# Patient Record
Sex: MALE | Race: WHITE | NOT HISPANIC OR LATINO
[De-identification: names, ages, dates, MRNs, and addresses within clinical notes are randomized per-mention and may not be internally consistent; named-entity substitution may affect disease eponyms.]

---

## 2017-03-30 ENCOUNTER — APPOINTMENT (OUTPATIENT)
Dept: PULMONOLOGY | Facility: CLINIC | Age: 82
End: 2017-03-30

## 2017-03-30 VITALS
SYSTOLIC BLOOD PRESSURE: 130 MMHG | HEART RATE: 70 BPM | TEMPERATURE: 97.3 F | WEIGHT: 175 LBS | DIASTOLIC BLOOD PRESSURE: 90 MMHG | HEIGHT: 72 IN | OXYGEN SATURATION: 96 % | BODY MASS INDEX: 23.7 KG/M2

## 2017-03-30 RX ORDER — RIVAROXABAN 2.5 MG/1
TABLET, FILM COATED ORAL
Refills: 0 | Status: ACTIVE | COMMUNITY

## 2017-03-30 RX ORDER — METHYLPREDNISOLONE 4 MG/1
4 TABLET ORAL
Qty: 1 | Refills: 5 | Status: ACTIVE | COMMUNITY
Start: 2017-03-30 | End: 1900-01-01

## 2017-07-22 ENCOUNTER — EMERGENCY (EMERGENCY)
Facility: HOSPITAL | Age: 82
LOS: 1 days | Discharge: HOME | End: 2017-07-22
Attending: EMERGENCY MEDICINE | Admitting: EMERGENCY MEDICINE
Payer: MEDICARE

## 2017-07-22 VITALS
OXYGEN SATURATION: 97 % | SYSTOLIC BLOOD PRESSURE: 142 MMHG | HEART RATE: 73 BPM | WEIGHT: 175.05 LBS | DIASTOLIC BLOOD PRESSURE: 92 MMHG | RESPIRATION RATE: 17 BRPM | HEIGHT: 72 IN | TEMPERATURE: 98 F
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
## 2017-07-22 DIAGNOSIS — M25.571 PAIN IN RIGHT ANKLE AND JOINTS OF RIGHT FOOT: ICD-10-CM

## 2017-07-22 DIAGNOSIS — Z98.89 OTHER SPECIFIED POSTPROCEDURAL STATES: Chronic | ICD-10-CM
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
## 2017-07-22 DIAGNOSIS — Z79.899 OTHER LONG TERM (CURRENT) DRUG THERAPY: ICD-10-CM

## 2017-07-22 DIAGNOSIS — S80.822A BLISTER (NONTHERMAL), LEFT LOWER LEG, INITIAL ENCOUNTER: ICD-10-CM

## 2017-07-22 DIAGNOSIS — S90.121A CONTUSION OF RIGHT LESSER TOE(S) WITHOUT DAMAGE TO NAIL, INITIAL ENCOUNTER: ICD-10-CM

## 2017-07-22 DIAGNOSIS — Z79.891 LONG TERM (CURRENT) USE OF OPIATE ANALGESIC: ICD-10-CM

## 2017-07-22 PROCEDURE — 99282 EMERGENCY DEPT VISIT SF MDM: CPT

## 2017-07-22 NOTE — ED PROVIDER NOTE - MUSCULOSKELETAL, MLM
mild bruising to toes 2,3 + sensate nontender 3+ pitting edema of leg- superficial blood filled blister about 2.5 cm in diameter to ant lower leg with some redness, no warmth
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
## 2017-07-22 NOTE — ED PROVIDER NOTE - OBJECTIVE STATEMENT
82 m w fall 82 m w swelling of R leg- fall about 5 days ago- hit lower leg, ankle and toes- small superficial blood blister/hematoma to R leg   ankle swelling  mild pain to ankle when he stands- but pain improves with walking- had CT of foot and ankle yesterday by pcp- neg as per px  came in today because toes 2 and 3rd showed bruising for the first time today- no pain in toes- already had neg xray of ankle and toes

## 2020-10-19 ENCOUNTER — LABORATORY RESULT (OUTPATIENT)
Age: 85
End: 2020-10-19

## 2020-10-23 ENCOUNTER — APPOINTMENT (OUTPATIENT)
Dept: PULMONOLOGY | Facility: CLINIC | Age: 85
End: 2020-10-23
Payer: MEDICARE

## 2020-10-23 VITALS
TEMPERATURE: 97.7 F | OXYGEN SATURATION: 96 % | HEIGHT: 72 IN | DIASTOLIC BLOOD PRESSURE: 72 MMHG | BODY MASS INDEX: 23.7 KG/M2 | HEART RATE: 102 BPM | SYSTOLIC BLOOD PRESSURE: 125 MMHG | WEIGHT: 175 LBS

## 2020-10-23 PROCEDURE — G0008: CPT

## 2020-10-23 PROCEDURE — 90686 IIV4 VACC NO PRSV 0.5 ML IM: CPT

## 2020-10-23 PROCEDURE — 94010 BREATHING CAPACITY TEST: CPT

## 2020-10-23 PROCEDURE — 71046 X-RAY EXAM CHEST 2 VIEWS: CPT

## 2020-10-23 PROCEDURE — 99213 OFFICE O/P EST LOW 20 MIN: CPT | Mod: 25

## 2020-10-24 NOTE — HISTORY OF PRESENT ILLNESS
[TextBox_4] : patient is here for unclear reasons a bit of a cough.  i recall seeing him several times for coughs that were well within the normal post viral cough and then ultimately would go away on their own.\par \par he has no internist and wants a flu shot and to have his lungs checked?

## 2021-02-13 ENCOUNTER — INPATIENT (INPATIENT)
Facility: HOSPITAL | Age: 86
LOS: 9 days | Discharge: NURSING, REHAB OR LONG-TERM CARE | DRG: 177 | End: 2021-02-23
Attending: INTERNAL MEDICINE | Admitting: INTERNAL MEDICINE
Payer: MEDICARE

## 2021-02-13 VITALS
TEMPERATURE: 98 F | WEIGHT: 179.9 LBS | DIASTOLIC BLOOD PRESSURE: 69 MMHG | HEIGHT: 72 IN | RESPIRATION RATE: 17 BRPM | SYSTOLIC BLOOD PRESSURE: 104 MMHG | HEART RATE: 85 BPM | OXYGEN SATURATION: 95 %

## 2021-02-13 DIAGNOSIS — I27.82 CHRONIC PULMONARY EMBOLISM: ICD-10-CM

## 2021-02-13 DIAGNOSIS — N17.9 ACUTE KIDNEY FAILURE, UNSPECIFIED: ICD-10-CM

## 2021-02-13 DIAGNOSIS — I34.1 NONRHEUMATIC MITRAL (VALVE) PROLAPSE: ICD-10-CM

## 2021-02-13 DIAGNOSIS — E87.4 MIXED DISORDER OF ACID-BASE BALANCE: ICD-10-CM

## 2021-02-13 DIAGNOSIS — I26.99 OTHER PULMONARY EMBOLISM WITHOUT ACUTE COR PULMONALE: ICD-10-CM

## 2021-02-13 DIAGNOSIS — U07.1 COVID-19: ICD-10-CM

## 2021-02-13 DIAGNOSIS — G25.0 ESSENTIAL TREMOR: ICD-10-CM

## 2021-02-13 DIAGNOSIS — Z98.89 OTHER SPECIFIED POSTPROCEDURAL STATES: Chronic | ICD-10-CM

## 2021-02-13 DIAGNOSIS — I10 ESSENTIAL (PRIMARY) HYPERTENSION: ICD-10-CM

## 2021-02-13 DIAGNOSIS — R63.8 OTHER SYMPTOMS AND SIGNS CONCERNING FOOD AND FLUID INTAKE: ICD-10-CM

## 2021-02-13 DIAGNOSIS — R79.89 OTHER SPECIFIED ABNORMAL FINDINGS OF BLOOD CHEMISTRY: ICD-10-CM

## 2021-02-13 LAB
ALBUMIN SERPL ELPH-MCNC: 3.5 G/DL — SIGNIFICANT CHANGE UP (ref 3.3–5)
ALP SERPL-CCNC: 108 U/L — SIGNIFICANT CHANGE UP (ref 40–120)
ALT FLD-CCNC: 54 U/L — HIGH (ref 10–45)
ANION GAP SERPL CALC-SCNC: 15 MMOL/L — SIGNIFICANT CHANGE UP (ref 5–17)
ANION GAP SERPL CALC-SCNC: 18 MMOL/L — HIGH (ref 5–17)
AST SERPL-CCNC: 98 U/L — HIGH (ref 10–40)
BASE EXCESS BLDV CALC-SCNC: -4.7 MMOL/L — SIGNIFICANT CHANGE UP
BASOPHILS # BLD AUTO: 0 K/UL — SIGNIFICANT CHANGE UP (ref 0–0.2)
BASOPHILS NFR BLD AUTO: 0 % — SIGNIFICANT CHANGE UP (ref 0–2)
BILIRUB SERPL-MCNC: 0.8 MG/DL — SIGNIFICANT CHANGE UP (ref 0.2–1.2)
BUN SERPL-MCNC: 54 MG/DL — HIGH (ref 7–23)
BUN SERPL-MCNC: 57 MG/DL — HIGH (ref 7–23)
BURR CELLS BLD QL SMEAR: SLIGHT — SIGNIFICANT CHANGE UP
CALCIUM SERPL-MCNC: 8.4 MG/DL — SIGNIFICANT CHANGE UP (ref 8.4–10.5)
CALCIUM SERPL-MCNC: 9.1 MG/DL — SIGNIFICANT CHANGE UP (ref 8.4–10.5)
CHLORIDE SERPL-SCNC: 97 MMOL/L — SIGNIFICANT CHANGE UP (ref 96–108)
CHLORIDE SERPL-SCNC: 99 MMOL/L — SIGNIFICANT CHANGE UP (ref 96–108)
CK MB CFR SERPL CALC: 2.8 NG/ML — SIGNIFICANT CHANGE UP (ref 0–6.7)
CK SERPL-CCNC: 1655 U/L — HIGH (ref 30–200)
CO2 SERPL-SCNC: 14 MMOL/L — LOW (ref 22–31)
CO2 SERPL-SCNC: 18 MMOL/L — LOW (ref 22–31)
CREAT SERPL-MCNC: 2.93 MG/DL — HIGH (ref 0.5–1.3)
CREAT SERPL-MCNC: 3.07 MG/DL — HIGH (ref 0.5–1.3)
CRP SERPL-MCNC: 33.21 MG/DL — HIGH (ref 0–0.4)
D DIMER BLD IA.RAPID-MCNC: 425 NG/ML DDU — HIGH
DACRYOCYTES BLD QL SMEAR: SLIGHT — SIGNIFICANT CHANGE UP
EOSINOPHIL # BLD AUTO: 0.06 K/UL — SIGNIFICANT CHANGE UP (ref 0–0.5)
EOSINOPHIL NFR BLD AUTO: 0.9 % — SIGNIFICANT CHANGE UP (ref 0–6)
FERRITIN SERPL-MCNC: 1853 NG/ML — HIGH (ref 30–400)
GIANT PLATELETS BLD QL SMEAR: PRESENT — SIGNIFICANT CHANGE UP
GLUCOSE SERPL-MCNC: 148 MG/DL — HIGH (ref 70–99)
GLUCOSE SERPL-MCNC: 86 MG/DL — SIGNIFICANT CHANGE UP (ref 70–99)
HCO3 BLDV-SCNC: 20 MMOL/L — SIGNIFICANT CHANGE UP (ref 20–27)
HCT VFR BLD CALC: 46.5 % — SIGNIFICANT CHANGE UP (ref 39–50)
HGB BLD-MCNC: 15.3 G/DL — SIGNIFICANT CHANGE UP (ref 13–17)
LACTATE SERPL-SCNC: 1.7 MMOL/L — SIGNIFICANT CHANGE UP (ref 0.5–2)
LACTATE SERPL-SCNC: 3.1 MMOL/L — HIGH (ref 0.5–2)
LYMPHOCYTES # BLD AUTO: 0.12 K/UL — LOW (ref 1–3.3)
LYMPHOCYTES # BLD AUTO: 1.7 % — LOW (ref 13–44)
MAGNESIUM SERPL-MCNC: 1.9 MG/DL — SIGNIFICANT CHANGE UP (ref 1.6–2.6)
MANUAL SMEAR VERIFICATION: SIGNIFICANT CHANGE UP
MCHC RBC-ENTMCNC: 32.3 PG — SIGNIFICANT CHANGE UP (ref 27–34)
MCHC RBC-ENTMCNC: 32.9 GM/DL — SIGNIFICANT CHANGE UP (ref 32–36)
MCV RBC AUTO: 98.1 FL — SIGNIFICANT CHANGE UP (ref 80–100)
MONOCYTES # BLD AUTO: 0 K/UL — SIGNIFICANT CHANGE UP (ref 0–0.9)
MONOCYTES NFR BLD AUTO: 0 % — LOW (ref 2–14)
NEUTROPHILS # BLD AUTO: 6.76 K/UL — SIGNIFICANT CHANGE UP (ref 1.8–7.4)
NEUTROPHILS NFR BLD AUTO: 87 % — HIGH (ref 43–77)
NEUTS BAND # BLD: 7.8 % — SIGNIFICANT CHANGE UP (ref 0–8)
PCO2 BLDV: 36 MMHG — LOW (ref 41–51)
PH BLDV: 7.36 — SIGNIFICANT CHANGE UP (ref 7.32–7.43)
PHOSPHATE SERPL-MCNC: 3.5 MG/DL — SIGNIFICANT CHANGE UP (ref 2.5–4.5)
PLAT MORPH BLD: ABNORMAL
PLATELET # BLD AUTO: 141 K/UL — LOW (ref 150–400)
PO2 BLDV: 18 MMHG — SIGNIFICANT CHANGE UP
POIKILOCYTOSIS BLD QL AUTO: SLIGHT — SIGNIFICANT CHANGE UP
POTASSIUM SERPL-MCNC: 3.8 MMOL/L — SIGNIFICANT CHANGE UP (ref 3.5–5.3)
POTASSIUM SERPL-MCNC: 4.2 MMOL/L — SIGNIFICANT CHANGE UP (ref 3.5–5.3)
POTASSIUM SERPL-SCNC: 3.8 MMOL/L — SIGNIFICANT CHANGE UP (ref 3.5–5.3)
POTASSIUM SERPL-SCNC: 4.2 MMOL/L — SIGNIFICANT CHANGE UP (ref 3.5–5.3)
PROCALCITONIN SERPL-MCNC: 67.29 NG/ML — HIGH (ref 0.02–0.1)
PROT SERPL-MCNC: 7.5 G/DL — SIGNIFICANT CHANGE UP (ref 6–8.3)
RBC # BLD: 4.74 M/UL — SIGNIFICANT CHANGE UP (ref 4.2–5.8)
RBC # FLD: 13.5 % — SIGNIFICANT CHANGE UP (ref 10.3–14.5)
RBC BLD AUTO: ABNORMAL
SAO2 % BLDV: 16 % — SIGNIFICANT CHANGE UP
SARS-COV-2 RNA SPEC QL NAA+PROBE: DETECTED
SODIUM SERPL-SCNC: 128 MMOL/L — LOW (ref 135–145)
SODIUM SERPL-SCNC: 133 MMOL/L — LOW (ref 135–145)
TROPONIN T SERPL-MCNC: 0.03 NG/ML — HIGH (ref 0–0.01)
VARIANT LYMPHS # BLD: 2.6 % — SIGNIFICANT CHANGE UP (ref 0–6)
WBC # BLD: 7.13 K/UL — SIGNIFICANT CHANGE UP (ref 3.8–10.5)
WBC # FLD AUTO: 7.13 K/UL — SIGNIFICANT CHANGE UP (ref 3.8–10.5)

## 2021-02-13 PROCEDURE — 93010 ELECTROCARDIOGRAM REPORT: CPT

## 2021-02-13 PROCEDURE — 99223 1ST HOSP IP/OBS HIGH 75: CPT | Mod: CS,GC

## 2021-02-13 PROCEDURE — 99291 CRITICAL CARE FIRST HOUR: CPT

## 2021-02-13 PROCEDURE — 71045 X-RAY EXAM CHEST 1 VIEW: CPT | Mod: 26

## 2021-02-13 RX ORDER — ACETAMINOPHEN 500 MG
650 TABLET ORAL EVERY 6 HOURS
Refills: 0 | Status: DISCONTINUED | OUTPATIENT
Start: 2021-02-13 | End: 2021-02-14

## 2021-02-13 RX ORDER — LOSARTAN POTASSIUM 100 MG/1
1 TABLET, FILM COATED ORAL
Qty: 0 | Refills: 0 | DISCHARGE

## 2021-02-13 RX ORDER — SODIUM CHLORIDE 9 MG/ML
1000 INJECTION INTRAMUSCULAR; INTRAVENOUS; SUBCUTANEOUS ONCE
Refills: 0 | Status: COMPLETED | OUTPATIENT
Start: 2021-02-13 | End: 2021-02-13

## 2021-02-13 RX ORDER — AZITHROMYCIN 500 MG/1
500 TABLET, FILM COATED ORAL EVERY 24 HOURS
Refills: 0 | Status: DISCONTINUED | OUTPATIENT
Start: 2021-02-13 | End: 2021-02-14

## 2021-02-13 RX ORDER — OMEPRAZOLE 10 MG/1
1 CAPSULE, DELAYED RELEASE ORAL
Qty: 0 | Refills: 0 | DISCHARGE

## 2021-02-13 RX ORDER — AZITHROMYCIN 500 MG/1
500 TABLET, FILM COATED ORAL EVERY 24 HOURS
Refills: 0 | Status: DISCONTINUED | OUTPATIENT
Start: 2021-02-13 | End: 2021-02-13

## 2021-02-13 RX ORDER — PANTOPRAZOLE SODIUM 20 MG/1
40 TABLET, DELAYED RELEASE ORAL
Refills: 0 | Status: DISCONTINUED | OUTPATIENT
Start: 2021-02-13 | End: 2021-02-23

## 2021-02-13 RX ORDER — CEFTRIAXONE 500 MG/1
1000 INJECTION, POWDER, FOR SOLUTION INTRAMUSCULAR; INTRAVENOUS EVERY 24 HOURS
Refills: 0 | Status: DISCONTINUED | OUTPATIENT
Start: 2021-02-13 | End: 2021-02-14

## 2021-02-13 RX ADMIN — AZITHROMYCIN 255 MILLIGRAM(S): 500 TABLET, FILM COATED ORAL at 19:29

## 2021-02-13 RX ADMIN — SODIUM CHLORIDE 1000 MILLILITER(S): 9 INJECTION INTRAMUSCULAR; INTRAVENOUS; SUBCUTANEOUS at 18:26

## 2021-02-13 RX ADMIN — SODIUM CHLORIDE 1000 MILLILITER(S): 9 INJECTION INTRAMUSCULAR; INTRAVENOUS; SUBCUTANEOUS at 17:26

## 2021-02-13 RX ADMIN — CEFTRIAXONE 100 MILLIGRAM(S): 500 INJECTION, POWDER, FOR SOLUTION INTRAMUSCULAR; INTRAVENOUS at 19:15

## 2021-02-13 RX ADMIN — Medication 650 MILLIGRAM(S): at 20:59

## 2021-02-13 NOTE — ED PROVIDER NOTE - NS ED ATTENDING STATEMENT MOD
Attending Only I have personally provided the amount of critical care time documented below excluding time spent on separate procedures.

## 2021-02-13 NOTE — ED PROVIDER NOTE - PHYSICAL EXAMINATION
VITAL SIGNS: I have reviewed nursing notes and confirm.  CONSTITUTIONAL: Well-developed; well-nourished; in no acute distress.   SKIN:  Warm and dry, no acute rash.   HEAD:  normocephalic, atraumatic.  EYES: PERRL.  EOM intact; conjunctiva and sclera clear.  ENT: No nasal discharge; airway clear.   NECK: Supple; non tender.  CARD: S1, S2 normal; no murmurs, gallops, or rubs. Regular rate and rhythm.   RESP:  Clear to auscultation b/l, no wheezes, rales or rhonchi.  ABD: Normal bowel sounds; soft; non-distended; non-tender; no guarding/rebound.  EXT: Normal ROM. No clubbing, cyanosis or edema. 2+ pulses to b/l ue/le.  NEURO: Alert, oriented, grossly unremarkable.  5/5 strength x 4 extremities against gravity and external force.  No drift x 4 extremities.  Sensation intact and symmetric x 4 extremities.  No facial asymmetry.    PSYCH: Cooperative, mood and affect appropriate.

## 2021-02-13 NOTE — H&P ADULT - NSHPREVIEWOFSYSTEMS_GEN_ALL_CORE
85 year old male with history of b/l PE s/p R inguinal hernia surgery at [facility] completed 2mo Xarelto, mitral valve prolapse, primary care out of [place] presents to [facility] ED secondary to concern for shortness of breath worsening over the past several days.  Patient states symptoms began 3 days ago with dry cough and diarrhea.  He notes shortness of breath has gotten progressively worse until today when he decided to seek medical attention.  Patient states he has a resting tremor, and feels generally fatigued without lateralizing weakness.  He denies associated fever, chills, chest pain, headache, visual changes, abdominal pain, nausea, emesis, changes to bowel movements, peripheral edema, rashes, recent travel, known sick contacts or any additional acute complaints or concerns at this time.

## 2021-02-13 NOTE — H&P ADULT - NSHPSOCIALHISTORY_GEN_ALL_CORE
Living situation: lives alone, independent in ADLs  Occupation: former [employer]    Tobacco use: denies  EtOH use: 2 shots of vodka in AM, 1 martini qhs  Illicit drug use: denies

## 2021-02-13 NOTE — H&P ADULT - NSHPLABSRESULTS_GEN_ALL_CORE
LABS:                        15.3   7.13  )-----------( 141      ( 13 Feb 2021 16:57 )             46.5     02-13    133<L>  |  97  |  54<H>  ----------------------------<  86  4.2   |  18<L>  |  2.93<H>    Ca    9.1      13 Feb 2021 16:57  Mg     2.0     02-13    TPro  7.5  /  Alb  3.5  /  TBili  0.8  /  DBili  x   /  AST  98<H>  /  ALT  54<H>  /  AlkPhos  108  02-13      CARDIAC MARKERS ( 13 Feb 2021 16:57 )  x     / 0.02 ng/mL / x     / x     / x          RADIOLOGY, EKG AND ADDITIONAL TESTS: Reviewed.

## 2021-02-13 NOTE — H&P ADULT - ATTENDING COMMENTS
85 year old male patient with history of PE (on xarelto), mitral valve prolapse, and CKD presented to [facility] ED with diarrhea, weakness, myalgias and dry cough. Patient's ED workup was significant for lactic acidosis, elevated creatinine, elevated procalcitonin, positive covid 19 PCR. Patient is admitted to COVID 19 unit for treatment and monitoring.     1.COVID 19 infection  supplemental o2 2 liters currently , will wean off for determination of saturation on room air to assess for remdesevir and steroids  admit to covid 19 unit    2.CAP and Elevated Procalcitonin  sob, chest xray with patchy infiltrates, cant rule out superimposing bacterial pneumonia with these findings  will cover for CAP with cef and azithromycin  for second dose of azithromycin will need to repeat EKG to assess Qtc as currently elevated    3. Hyponatremia  unclear etiology  patient has lung pathology with COVID 19 and hyponatremia worsened after IVF SIADH is in the differential  also elevated cr is worsening from baseline GFR reported (30) - renal failure is in the differential as well  bladder scan to assess obstruction if present Cardenas insertion  strict i&o  monitor serum cr  urine electrolytes to identify etiology

## 2021-02-13 NOTE — ED ADULT NURSE REASSESSMENT NOTE - NS ED NURSE REASSESS COMMENT FT1
Pt resting in stretcher with IVf infusing. Pt given water and placed in a position of comfort. aware with plan of care.

## 2021-02-13 NOTE — ED ADULT NURSE NOTE - OBJECTIVE STATEMENT
86 yo M pmhx baseline tremor presents to ED co SOB and diarrhea x4 days. Pt is Aox3, speaking in clear and coherent sentences, unable to ambulate d/t tremor worsening. Pt reports fevers at home as high as 102F relieved with Tylneol, last took Tylenol this AM. chest rise equal and symmetrical, lung sounds clear. EKG completed, CCM initiated, PIV placed. Denies CP, headache, dizziness, lightheadedness.

## 2021-02-13 NOTE — H&P ADULT - PROBLEM SELECTOR PLAN 1
Pt reports hx of renal failure with baseline GFR ~30. Follows with Dr. Jamil Tee (nephrology) and Dr. Ishaan Gaming (urology).  -On admission, found to have Cr 2.93 in setting of decreased PO intake, fevers, covid-19  -Etiology of BERTRAND likely pre-renal  -F/u urine lytes  -Monitor Cr with daily BMP, monitor urine output  -Avoid nephrotoxic medications

## 2021-02-13 NOTE — ED PROVIDER NOTE - OBJECTIVE STATEMENT
85 year old male with history of b/l PE s/p R inguinal hernia surgery at [facility] completed 2mo Xarelto, mitral valve prolapse, primary care out of [place] presnets to [facility] ED secondary to concern for shortness of breath worsening over the past several days.  Patient states symptoms began 3 days ago with dry cough and diarrhea.  He notes shortness of breath has gotten progressively worse until today when he decided to seek medical attention.  Patient states he has a resting tremor, and feels generally fatigued without lateralizing weakness.  He denies associated fever, chills, chest pain, headache, visual changes, abdominal pain, nausea, emesis, changes to bowel movements, peripheral edema, rashes, recent travel, known sick contacts or any additional acute complaints or concerns at this time. 85 year old male with history of b/l PE s/p R inguinal hernia surgery at [facility] completed 2mo Xarelto, mitral valve prolapse, primary care out of [place] presents to [facility] ED secondary to concern for shortness of breath worsening over the past several days.  Patient states symptoms began 3 days ago with dry cough and diarrhea.  He notes shortness of breath has gotten progressively worse until today when he decided to seek medical attention.  Patient states he has a resting tremor, and feels generally fatigued without lateralizing weakness.  He denies associated fever, chills, chest pain, headache, visual changes, abdominal pain, nausea, emesis, changes to bowel movements, peripheral edema, rashes, recent travel, known sick contacts or any additional acute complaints or concerns at this time.

## 2021-02-13 NOTE — H&P ADULT - PROBLEM SELECTOR PLAN 4
Pt with hx of PE, per daughter ~5yrs ago. On xarelto 10mg qd for indefinite treatment of PE. No hx of malignancy, immobilization, or other risk factors, likely unprovoked.  -Last dose 2/13AM  -Xarelto contraindicated in setting of BERTRAND. As PE occurred ~5yrs ago, unclear whether there is an indication to transition to heparin gtt. Will hold anticoagulation for now and consider discontinuing AC on discharge (in discussion w/ PMD, Dr. Tee). Pt with hx of PE, per daughter ~5yrs ago. On xarelto 10mg qd for indefinite treatment of PE. No hx of malignancy, immobilization, or other risk factors, likely unprovoked.  -Last dose 2/13AM  -Xarelto contraindicated in setting of BERTRAND. Unclear if pt requires AC 2/2 PE as it is remote, however pt at increased risk for DVT due to covid-19 infection.  -Will start heparin gtt in AM (at time of next xarelto dose) for full anticoagulation (PE + risk of DVT)

## 2021-02-13 NOTE — H&P ADULT - PROBLEM SELECTOR PLAN 6
BNP 3014 on admission, unknown baseline. No hx or signs of heart failure, no JVD or lower extremity edema. Etiology likely 2/2 renal failure and MVP.  -Nothing to do    #Elevated trop 0.02  No chest pain, no ischemic changes on EKG.  -Likely demand 2/2 covid-19, poor clearance from BERTRAND-on-CKD  -Trend to clear  -If c/o of chest pain, stat EKG and repeat cardiac enzymes    #Prolonged QTc  QTc 502 on admission, no hx of QTc-prolonging medications  -F/u AM EKG to monitor for improvement

## 2021-02-13 NOTE — H&P ADULT - NSHPOUTPATIENTPROVIDERS_GEN_ALL_CORE
PCP/Urologist - Dr. Jamil Tee ([facility]), [phone]  Nephrologist - Dr. Ishaan Gaming ([facility]), [phone]

## 2021-02-13 NOTE — H&P ADULT - PROBLEM SELECTOR PLAN 5
Hx of MVP. Pt does not have a cardiologist.  -Nothing to do Hx of MVP. Pt does not have a cardiologist.  -Nothing to do    #Hyponatremia  Na 133 on admission, worsened to 128 after 1L NS. possibly 2/2 renal failure vs SIADH from covid-19  -F/u urine osm and urine lytes to assess for SIADH  -f/u AM Na

## 2021-02-13 NOTE — H&P ADULT - HISTORY OF PRESENT ILLNESS
85M w/ PMH of b/l PE and past surgical hx of R inguinal hernia repair who presented    In the ED,  Vitals: T98.1, HR 85, /64, RR 17, O2sat 95% on room air  Labs: Na 133, HCO3 18, BUN 54, Cr 2.93 | trop T 0.02, BNP 3014 | lactate 3.1    CRP 33.21, ferritin 1853 | procalcitonin 67.29    UA:  Imaging:    EKG:    CXR:  Interventions: ceftriaxone 1g, azithromycin 500mg, 1L IV NS   85M w/ PMH of b/l PE (on xarelto), mitral valve prolapse, and CKD (reports baseline GFR 30) who presents to [facility] ED c/o 5d of NBNB diarrhea, dehydration, weakness, myalgias, dry cough, and worsening of his baseline tremor. He has also had intermittent fevers with Tmax 102 at home. He denies recent travel or sick contacts. ROS negative for chest pain or palpitations, shortness of breath, abd pain.    He has had a baseline tremor for over a decade, worse with intention. He has been to neurologists for evaluation, no underlying etiology identified. He self-medicates with vodka, he takes 2 shots of vodka every morning one martini at night with improvement of his symptoms. There has been no increase in the amount of vodka he drinks during acute worsening of tremor. At baseline pt is independent in ADLS, lives alone. Per daughter, very dehydrated. Noticed that his tremors were worse in the AM when waking up.     In the ED,  Vitals: T98.1, HR 85, /64, RR 17, O2sat 95% on room air  Labs: Na 133, HCO3 18, BUN 54, Cr 2.93 | trop T 0.02, BNP 3014 | lactate 3.1    CRP 33.21, ferritin 1853 | procalcitonin 67.29    UA: pending    VBG: pH 7.36, pCO2 36  Imaging:    EKG: LAD, HR 85, bifasciular block (LAFB + RBBB)    CXR: diffuse infiltrates bilaterally  Interventions: ceftriaxone 1g, azithromycin 500mg, 1L IV NS. Repeat lactate after fluids 1.7.

## 2021-02-13 NOTE — H&P ADULT - PROBLEM SELECTOR PLAN 8
Home medication: losartan 50mg qd  -/64 on admission  -Currently holding in setting of BERTRAND  -Restart when BERTRAND resolves and BP tolerates

## 2021-02-13 NOTE — H&P ADULT - PROBLEM SELECTOR PLAN 3
-COVID PCR positive (2/13/21)  -CXR w/ diffuse infiltrates b/l  -O2 requirements: 95% on 2L nc  -Covid labs: CRP 33.21, ferritin 1853  -Not eligible for decadron based on O2sat, not eligible for remdesivir based on O2sat and BERTRAND  -Procalcitonin significantly elevated, procalcitonin 67.29  Plan:    -obtain walking saturation in AM to determine if pt qualifies for decadron/home O2    -wean O2 requirements as tolerated to maintain O2sat >90%    -daily COVID labs (D-dimer, ferritin, CRP)

## 2021-02-13 NOTE — H&P ADULT - PROBLEM SELECTOR PLAN 7
Febrile to 102 in ED, w/ significantly elevated procal 67.29.  -CXR w/o focal consolidation  -S/p ceftriaxone 1g, azithromycin 500mg in ED  Plan:    -f/u urine legionella and urine strep to eval for atypical pna    -f/u sputum clx    -f/u blood cultures obtained in ED Febrile to 102 in ED, w/ significantly elevated procal 67.29.  -CXR w/o focal consolidation  -S/p ceftriaxone 1g, azithromycin 500mg in ED  Plan:    -f/u urine legionella and urine strep to eval for atypical pna    -f/u sputum clx    -f/u blood cultures obtained in ED    -f/u AM QTc, if improved (<500), will consider restarting azithromycin for atypical coverage

## 2021-02-13 NOTE — H&P ADULT - ASSESSMENT
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 ED c/o 5d of NBNB diarrhea, dehydration, weakness, myalgias, dry cough, and worsening of his baseline tremor, fevers. Found to have BERTRAND w/ Cr 2.93 and covid-19. Admitted to [facility] for further management.

## 2021-02-13 NOTE — ED PROVIDER NOTE - CLINICAL SUMMARY MEDICAL DECISION MAKING FREE TEXT BOX
Patient presents to ED with concern for cough, shortness of breath worsening over the past several days.  Patient tremulous, noting resting tremor at baseline.  Patient feeling generally weak as well.  Lungs clear on exam.  Labs reviewed - elevated Cr (unknown baseline), elevated trop (suspect leak from renal insuff), covid labs elevated, CXR consistent with COVID infiltrates.  Lactate also elevated.  Patient given initial liter of fluid in ED and placed on n/c.  Appears comfortable.  Given elevated lactate, suspected new renal insuff, respiratory symptoms and CXR consistent with COVID will admit to regional medicine for close monitoring.  COVID swab pending, however suspect COVID at this time.  Hx of PE noted, however cannot obtain confirmatory CTA chest given elevated Cr.  Patient to be admitted to regional medicine at this time.  He is aware of plan and in agreement. Patient presents to ED with concern for cough, shortness of breath worsening over the past several days.  Patient tremulous, noting resting tremor at baseline.  Patient feeling generally weak as well.  Lungs clear on exam.  Labs reviewed - elevated Cr (unknown baseline), elevated trop (suspect leak from renal insuff), covid labs elevated, CXR consistent with COVID infiltrates.  Lactate also elevated.  Patient given initial liter of fluid in ED and placed on n/c.  Appears comfortable.  Given elevated lactate, suspected new renal insuff, respiratory symptoms and CXR consistent with COVID will admit to regional medicine for close monitoring.  COVID swab pending, however suspect COVID at this time.  Hx of PE noted, however cannot obtain confirmatory CTA chest given elevated Cr.  Will discuss with admitting team and suggest considering V/Q scan on admission.  Patient to be admitted to regional medicine at this time.  He is aware of plan and in agreement.

## 2021-02-13 NOTE — H&P ADULT - NSHPPHYSICALEXAM_GEN_ALL_CORE
VITAL SIGNS:  T(C): 37.4 (02-13-21 @ 22:06), Max: 39.3 (02-13-21 @ 20:31)  HR: 100 (02-13-21 @ 22:06) (77 - 100)  BP: 100/55 (02-13-21 @ 22:06) (100/55 - 110/67)  RR: 20 (02-13-21 @ 22:06) (16 - 20)  SpO2: 95% (02-13-21 @ 22:06) (95% - 95%)    PHYSICAL EXAM:  Constitutional: WDWN; NAD  Neurologic: AAOx3; baseline tremor worse with intention, slow intentional speech with intermittent stuttering  HEENT: NC/AT; hearing aids, clear conjunctiva; no nasal discharge; no oropharyngeal erythema or exudates, MMM  Neck: supple; no JVD, no cervical, post-auricular or supraclavicular lymphadenopathy  Respiratory: on 2L nc (O2sat 95%), crackles in LLL and RML/RLL, no diminished breath sounds, no increased work of breathing or accessory muscle use  Cardiac: +S1/S2, no murmurs/rubs/gallops, RRR  Gastrointestinal: distended abdomen, soft, nontender to palpation, +BSx4  Genitourinary: no suprapubic tenderness  Back: spine midline, no bony tenderness or step-offs  Extremities: WWP, no clubbing or cyanosis; no peripheral edema b/l, tender to palpation diffusely  Musculoskeletal: NROM x4; no joint swelling, tenderness or erythema  Vascular: 2+ radial, femoral, DP/PT pulses B/L  Dermatologic: skin warm, dry and intact; no rashes, wounds, or scars  Psychiatric: affect and characteristics of appearance, verbalizations, behaviors are appropriate

## 2021-02-13 NOTE — H&P ADULT - PROBLEM SELECTOR PLAN 9
Tremor for >10yrs, worse with intention. self-medicates with vodka, he takes 2 shots of vodka every morning one martini at night with improvement of his symptoms. There has been no increase in the amount of vodka he drinks during acute worsening of tremor.  -Worsening of tremor likely exacerbated by fever and dehydration. Monitor for improvement with symptom management.  -Pt independent in ADLs at baseline, however daughter requesting assessment for home health aid after discharge.  -F/u social work consult

## 2021-02-13 NOTE — H&P ADULT - PROBLEM SELECTOR PLAN 10
F: encourage PO  E: replete to K>4, Mg>2, Phos>2.5  N: regular diet  Ppx: SCDs    Code Status: full code    Dispo: covid-RMF F: encourage PO  E: replete to K>4, Mg>2, Phos>2.5  N: regular diet  Ppx: on xarelto (last dose 2/13 AM)    Code Status: full code    Dispo: covid-RMF

## 2021-02-13 NOTE — ED PROVIDER NOTE - CARE PLAN
Principal Discharge DX:	Shortness of breath   Principal Discharge DX:	Shortness of breath  Secondary Diagnosis:	Renal insufficiency

## 2021-02-13 NOTE — ED ADULT NURSE REASSESSMENT NOTE - NS ED NURSE REASSESS COMMENT FT1
Pt provided with a sandwich and water. repositioned in bed. inpatient MD at bedside assessing patient.

## 2021-02-14 DIAGNOSIS — U07.1 COVID-19: ICD-10-CM

## 2021-02-14 DIAGNOSIS — J15.9 UNSPECIFIED BACTERIAL PNEUMONIA: ICD-10-CM

## 2021-02-14 LAB
ALBUMIN SERPL ELPH-MCNC: 2.7 G/DL — LOW (ref 3.3–5)
ALP SERPL-CCNC: 108 U/L — SIGNIFICANT CHANGE UP (ref 40–120)
ALT FLD-CCNC: 38 U/L — SIGNIFICANT CHANGE UP (ref 10–45)
ANION GAP SERPL CALC-SCNC: 13 MMOL/L — SIGNIFICANT CHANGE UP (ref 5–17)
ANION GAP SERPL CALC-SCNC: 15 MMOL/L — SIGNIFICANT CHANGE UP (ref 5–17)
ANION GAP SERPL CALC-SCNC: 16 MMOL/L — SIGNIFICANT CHANGE UP (ref 5–17)
AST SERPL-CCNC: 71 U/L — HIGH (ref 10–40)
BASOPHILS # BLD AUTO: 0.01 K/UL — SIGNIFICANT CHANGE UP (ref 0–0.2)
BASOPHILS NFR BLD AUTO: 0.2 % — SIGNIFICANT CHANGE UP (ref 0–2)
BILIRUB SERPL-MCNC: 0.5 MG/DL — SIGNIFICANT CHANGE UP (ref 0.2–1.2)
BUN SERPL-MCNC: 58 MG/DL — HIGH (ref 7–23)
BUN SERPL-MCNC: 63 MG/DL — HIGH (ref 7–23)
BUN SERPL-MCNC: 63 MG/DL — HIGH (ref 7–23)
CALCIUM SERPL-MCNC: 7.6 MG/DL — LOW (ref 8.4–10.5)
CALCIUM SERPL-MCNC: 7.8 MG/DL — LOW (ref 8.4–10.5)
CALCIUM SERPL-MCNC: 8 MG/DL — LOW (ref 8.4–10.5)
CHLORIDE SERPL-SCNC: 100 MMOL/L — SIGNIFICANT CHANGE UP (ref 96–108)
CHLORIDE SERPL-SCNC: 102 MMOL/L — SIGNIFICANT CHANGE UP (ref 96–108)
CHLORIDE SERPL-SCNC: 99 MMOL/L — SIGNIFICANT CHANGE UP (ref 96–108)
CK MB CFR SERPL CALC: 3.4 NG/ML — SIGNIFICANT CHANGE UP (ref 0–6.7)
CK SERPL-CCNC: 1585 U/L — HIGH (ref 30–200)
CK SERPL-CCNC: 1636 U/L — HIGH (ref 30–200)
CO2 SERPL-SCNC: 16 MMOL/L — LOW (ref 22–31)
CO2 SERPL-SCNC: 16 MMOL/L — LOW (ref 22–31)
CO2 SERPL-SCNC: 20 MMOL/L — LOW (ref 22–31)
CREAT SERPL-MCNC: 3.13 MG/DL — HIGH (ref 0.5–1.3)
CREAT SERPL-MCNC: 3.14 MG/DL — HIGH (ref 0.5–1.3)
CREAT SERPL-MCNC: 3.26 MG/DL — HIGH (ref 0.5–1.3)
CRP SERPL-MCNC: 31.72 MG/DL — HIGH (ref 0–0.4)
D DIMER BLD IA.RAPID-MCNC: 395 NG/ML DDU — HIGH
EOSINOPHIL # BLD AUTO: 0 K/UL — SIGNIFICANT CHANGE UP (ref 0–0.5)
EOSINOPHIL NFR BLD AUTO: 0 % — SIGNIFICANT CHANGE UP (ref 0–6)
FERRITIN SERPL-MCNC: 2102 NG/ML — HIGH (ref 30–400)
GLUCOSE BLDC GLUCOMTR-MCNC: 117 MG/DL — HIGH (ref 70–99)
GLUCOSE SERPL-MCNC: 107 MG/DL — HIGH (ref 70–99)
GLUCOSE SERPL-MCNC: 122 MG/DL — HIGH (ref 70–99)
GLUCOSE SERPL-MCNC: 84 MG/DL — SIGNIFICANT CHANGE UP (ref 70–99)
HCT VFR BLD CALC: 38.2 % — LOW (ref 39–50)
HGB BLD-MCNC: 12.8 G/DL — LOW (ref 13–17)
IMM GRANULOCYTES NFR BLD AUTO: 1 % — SIGNIFICANT CHANGE UP (ref 0–1.5)
LYMPHOCYTES # BLD AUTO: 0.53 K/UL — LOW (ref 1–3.3)
LYMPHOCYTES # BLD AUTO: 10.6 % — LOW (ref 13–44)
MAGNESIUM SERPL-MCNC: 1.7 MG/DL — SIGNIFICANT CHANGE UP (ref 1.6–2.6)
MCHC RBC-ENTMCNC: 32.9 PG — SIGNIFICANT CHANGE UP (ref 27–34)
MCHC RBC-ENTMCNC: 33.5 GM/DL — SIGNIFICANT CHANGE UP (ref 32–36)
MCV RBC AUTO: 98.2 FL — SIGNIFICANT CHANGE UP (ref 80–100)
MONOCYTES # BLD AUTO: 0.1 K/UL — SIGNIFICANT CHANGE UP (ref 0–0.9)
MONOCYTES NFR BLD AUTO: 2 % — SIGNIFICANT CHANGE UP (ref 2–14)
NEUTROPHILS # BLD AUTO: 4.32 K/UL — SIGNIFICANT CHANGE UP (ref 1.8–7.4)
NEUTROPHILS NFR BLD AUTO: 86.2 % — HIGH (ref 43–77)
NRBC # BLD: 0 /100 WBCS — SIGNIFICANT CHANGE UP (ref 0–0)
PHOSPHATE SERPL-MCNC: 4.3 MG/DL — SIGNIFICANT CHANGE UP (ref 2.5–4.5)
PLATELET # BLD AUTO: 122 K/UL — LOW (ref 150–400)
POTASSIUM SERPL-MCNC: 3.8 MMOL/L — SIGNIFICANT CHANGE UP (ref 3.5–5.3)
POTASSIUM SERPL-MCNC: 3.8 MMOL/L — SIGNIFICANT CHANGE UP (ref 3.5–5.3)
POTASSIUM SERPL-MCNC: 4.3 MMOL/L — SIGNIFICANT CHANGE UP (ref 3.5–5.3)
POTASSIUM SERPL-SCNC: 3.8 MMOL/L — SIGNIFICANT CHANGE UP (ref 3.5–5.3)
POTASSIUM SERPL-SCNC: 3.8 MMOL/L — SIGNIFICANT CHANGE UP (ref 3.5–5.3)
POTASSIUM SERPL-SCNC: 4.3 MMOL/L — SIGNIFICANT CHANGE UP (ref 3.5–5.3)
PROCALCITONIN SERPL-MCNC: 60.22 NG/ML — HIGH (ref 0.02–0.1)
PROT SERPL-MCNC: 6 G/DL — SIGNIFICANT CHANGE UP (ref 6–8.3)
RBC # BLD: 3.89 M/UL — LOW (ref 4.2–5.8)
RBC # FLD: 14 % — SIGNIFICANT CHANGE UP (ref 10.3–14.5)
SODIUM SERPL-SCNC: 132 MMOL/L — LOW (ref 135–145)
SODIUM SERPL-SCNC: 132 MMOL/L — LOW (ref 135–145)
SODIUM SERPL-SCNC: 133 MMOL/L — LOW (ref 135–145)
TROPONIN T SERPL-MCNC: 0.03 NG/ML — HIGH (ref 0–0.01)
TROPONIN T SERPL-MCNC: 0.04 NG/ML — CRITICAL HIGH (ref 0–0.01)
WBC # BLD: 5.01 K/UL — SIGNIFICANT CHANGE UP (ref 3.8–10.5)
WBC # FLD AUTO: 5.01 K/UL — SIGNIFICANT CHANGE UP (ref 3.8–10.5)

## 2021-02-14 PROCEDURE — 93010 ELECTROCARDIOGRAM REPORT: CPT

## 2021-02-14 PROCEDURE — 99233 SBSQ HOSP IP/OBS HIGH 50: CPT | Mod: GC

## 2021-02-14 RX ORDER — SODIUM CHLORIDE 9 MG/ML
1000 INJECTION INTRAMUSCULAR; INTRAVENOUS; SUBCUTANEOUS
Refills: 0 | Status: COMPLETED | OUTPATIENT
Start: 2021-02-14 | End: 2021-02-15

## 2021-02-14 RX ORDER — THIAMINE MONONITRATE (VIT B1) 100 MG
100 TABLET ORAL DAILY
Refills: 0 | Status: COMPLETED | OUTPATIENT
Start: 2021-02-14 | End: 2021-02-17

## 2021-02-14 RX ORDER — ACETAMINOPHEN 500 MG
1000 TABLET ORAL ONCE
Refills: 0 | Status: DISCONTINUED | OUTPATIENT
Start: 2021-02-14 | End: 2021-02-15

## 2021-02-14 RX ORDER — SODIUM CHLORIDE 9 MG/ML
500 INJECTION INTRAMUSCULAR; INTRAVENOUS; SUBCUTANEOUS ONCE
Refills: 0 | Status: COMPLETED | OUTPATIENT
Start: 2021-02-14 | End: 2021-02-14

## 2021-02-14 RX ORDER — PIPERACILLIN AND TAZOBACTAM 4; .5 G/20ML; G/20ML
3.38 INJECTION, POWDER, LYOPHILIZED, FOR SOLUTION INTRAVENOUS EVERY 12 HOURS
Refills: 0 | Status: DISCONTINUED | OUTPATIENT
Start: 2021-02-14 | End: 2021-02-15

## 2021-02-14 RX ORDER — ACETAMINOPHEN 500 MG
650 TABLET ORAL EVERY 4 HOURS
Refills: 0 | Status: DISCONTINUED | OUTPATIENT
Start: 2021-02-14 | End: 2021-02-23

## 2021-02-14 RX ORDER — PIPERACILLIN AND TAZOBACTAM 4; .5 G/20ML; G/20ML
3.38 INJECTION, POWDER, LYOPHILIZED, FOR SOLUTION INTRAVENOUS ONCE
Refills: 0 | Status: COMPLETED | OUTPATIENT
Start: 2021-02-14 | End: 2021-02-14

## 2021-02-14 RX ORDER — MAGNESIUM SULFATE 500 MG/ML
2 VIAL (ML) INJECTION ONCE
Refills: 0 | Status: COMPLETED | OUTPATIENT
Start: 2021-02-14 | End: 2021-02-14

## 2021-02-14 RX ORDER — FOLIC ACID 0.8 MG
1 TABLET ORAL DAILY
Refills: 0 | Status: DISCONTINUED | OUTPATIENT
Start: 2021-02-14 | End: 2021-02-23

## 2021-02-14 RX ADMIN — Medication 650 MILLIGRAM(S): at 23:38

## 2021-02-14 RX ADMIN — PANTOPRAZOLE SODIUM 40 MILLIGRAM(S): 20 TABLET, DELAYED RELEASE ORAL at 05:57

## 2021-02-14 RX ADMIN — Medication 650 MILLIGRAM(S): at 03:59

## 2021-02-14 RX ADMIN — Medication 1 MILLIGRAM(S): at 17:51

## 2021-02-14 RX ADMIN — SODIUM CHLORIDE 1000 MILLILITER(S): 9 INJECTION INTRAMUSCULAR; INTRAVENOUS; SUBCUTANEOUS at 05:56

## 2021-02-14 RX ADMIN — Medication 100 MILLIGRAM(S): at 16:46

## 2021-02-14 RX ADMIN — AZITHROMYCIN 255 MILLIGRAM(S): 500 TABLET, FILM COATED ORAL at 16:45

## 2021-02-14 RX ADMIN — Medication 1 MILLIGRAM(S): at 16:45

## 2021-02-14 RX ADMIN — Medication 1 MILLIGRAM(S): at 22:02

## 2021-02-14 RX ADMIN — SODIUM CHLORIDE 1000 MILLILITER(S): 9 INJECTION INTRAMUSCULAR; INTRAVENOUS; SUBCUTANEOUS at 06:42

## 2021-02-14 RX ADMIN — Medication 650 MILLIGRAM(S): at 06:43

## 2021-02-14 RX ADMIN — CEFTRIAXONE 100 MILLIGRAM(S): 500 INJECTION, POWDER, FOR SOLUTION INTRAMUSCULAR; INTRAVENOUS at 16:46

## 2021-02-14 RX ADMIN — Medication 1 TABLET(S): at 16:45

## 2021-02-14 RX ADMIN — Medication 50 GRAM(S): at 12:22

## 2021-02-14 NOTE — DIETITIAN INITIAL EVALUATION ADULT. - PROBLEM SELECTOR PLAN 5
Hx of MVP. Pt does not have a cardiologist.  -Nothing to do    #Hyponatremia  Na 133 on admission, worsened to 128 after 1L NS. possibly 2/2 renal failure vs SIADH from covid-19  -F/u urine osm and urine lytes to assess for SIADH  -f/u AM Na

## 2021-02-14 NOTE — PROGRESS NOTE ADULT - ATTENDING COMMENTS
Pt seen and examined at bedside. Reports that he feels improved. Denies SOB, CP. ROS is generally negative although pt reports feeling tired. Has  a good appetite. Vitals sig for initial high fevers, relative hypotension at 6 am w/ subsequent improvement of SBP throughout the day, oxygen saturation has remained stable, pt has remained afebrile since being on 4 uris. PE is significant for elderly M in NAD, AAO x 4 (although hard of hearing), HEENT - MMM, poor inspiratory effort w/ bibasilar crackles, CV- rrr, s1s2, + murmur, abd - soft, NTND, back is midline, no CVA tenderness, ext - wwp, + 1 pitting edema b/l to mid shin, skin - grossly unremarkable w/o evidence of infection, psych - normal affect, labs show normal WBC, markedly elevated CRP, procalcitonin, acute on chronic CKD w/ AGMA and hyponatremia    No overt signs of bacterial infection although suspicion is high - BCx NGTD, urine legionella, check U/A, UCx, pt found to be retaining and FC was placed; will consider checking Echo, c/w empiric CTX, if high fever again would recheck BCx, consider CT A/P given previous abd complaints. Pending US renal and dopplers; currently not meeting criteria for COVID tx; continue to trend BMP    Pt is low threshold for ICU consult

## 2021-02-14 NOTE — DIETITIAN INITIAL EVALUATION ADULT. - OTHER CALCULATIONS
ABW used for calculations as pt between % of IBW. (102% IBW). Nutrient needs based on [facility] standards of care for maintenance in adults, adjusted for COVID demand, fluid per team

## 2021-02-14 NOTE — PROGRESS NOTE ADULT - PROBLEM SELECTOR PLAN 5
Tremor for >10yrs, worse with intention. self-medicates with vodka, he takes 2 shots of vodka every morning one martini at night with improvement of his symptoms. There has been no increase in the amount of vodka he drinks during acute worsening of tremor. Worsening likely 2/2 acute infection and dehydration.   -Pt independent in ADLs at baseline, however daughter requesting assessment for home health aid after discharge.  -F/u social work consult

## 2021-02-14 NOTE — DIETITIAN INITIAL EVALUATION ADULT. - OTHER INFO
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 ED c/o 5d of NBNB diarrhea, dehydration, weakness, myalgias, dry cough, and worsening of his baseline tremor. He has also had intermittent fevers with Tmax 102 at home. He has had a baseline tremor for over a decade, worse with intention. He has been to neurologists for evaluation, no underlying etiology identified. He self-medicates with vodka, he takes 2 shots of vodka every morning one martini at night with improvement of his symptoms. There has been no increase in the amount of vodka he drinks during acute worsening of tremor. At baseline pt is independent in ADLS, lives alone. Per daughter, very dehydrated. Noticed that his tremors were worse in the AM when waking up. Subsequently found to be COVID + on admission w BERTRAND on CKD.     Unable to conduct a face to face interview or nutrition-focused physical exam due to limited contact restrictions related to the pt's medical condition and isolation precautions. Information obtained via chart review and team report, pt did not answer phone. Fair intake noted. Continues to be managed for COVID + dehydration at this time. No n/v/d, + c noted/ flatus, skin with geraldine score 15, 2+ edema to ankles, ecchymotic, no chewing/ swallowing issues or pain impacting intake. NKFA. Unsure of wt changes. Would recommend addition of thiamine, folic acid, and MVI + Ensure Enlive BID (700 kcal, 40g protein, 360 mL free H2O) to meet pt needs. Will follow per protocol and reinforce ed as able.

## 2021-02-14 NOTE — PROGRESS NOTE ADULT - PROBLEM SELECTOR PLAN 4
Pt with hx of PE, per daughter ~5yrs ago. On xarelto 10mg qd for indefinite treatment of PE. No hx of malignancy, immobilization, or other risk factors, likely unprovoked. xarelto contraindicated in BERTRAND. No urgent need for AC as PE was many years ago.  -c/w heparin subQ

## 2021-02-14 NOTE — PROGRESS NOTE ADULT - PROBLEM SELECTOR PLAN 1
Pt reports hx of renal failure with baseline GFR ~30. Follows with Dr. Jamil Tee (nephrology) and Dr. Ishaan Gaming (urology). On admission, found to have Cr 2.93 in setting of decreased PO intake, fevers, covid-19. Cr function did not improve with fluid boluses. CK 1585. likely intrinsic process possibly 2/2 COVID vs rhabdomyolysis. Bladder scan showed retention of 500 cc, so condon placed   -condon in place - strict I/O  -f/u renal US to rule out pyelo  -F/u urine lytes  -Monitor Cr with daily BMP, monitor urine output  -Avoid nephrotoxic medications

## 2021-02-14 NOTE — PROGRESS NOTE ADULT - ASSESSMENT
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 ED c/o 5d of NBNB diarrhea, dehydration, weakness- found to be COVID+, as well as have BERTRAND w/ Cr 2.93 and covid-19. Admitted to [facility] for further management.

## 2021-02-14 NOTE — PROGRESS NOTE ADULT - PROBLEM SELECTOR PLAN 2
-COVID PCR positive (2/13/21)  -CXR w/ diffuse infiltrates b/l  -O2 requirements: 95% on 2L nc  -Covid labs: CRP 33.21, ferritin 1853  -Not eligible for decadron based on O2sat, not eligible for remdesivir based on O2sat and BERTRAND. Procalcitonin significantly elevated, procalcitonin 67.29    -wean O2 requirements as tolerated to maintain O2sat >90%

## 2021-02-14 NOTE — DIETITIAN INITIAL EVALUATION ADULT. - PROBLEM SELECTOR PLAN 10
F: encourage PO  E: replete to K>4, Mg>2, Phos>2.5  N: regular diet  Ppx: on xarelto (last dose 2/13 AM)    Code Status: full code    Dispo: covid-RMF

## 2021-02-14 NOTE — DIETITIAN INITIAL EVALUATION ADULT. - PROBLEM SELECTOR PLAN 2
-Primary NAG metabolic acidosis, AG 18. 2/2 lactate, BUN (2/2 BERTRAND), likely w/ element of starvation ketosis  -Compensated w/ respiratory alkalosis, VBG: pH 7.36, pCO2 36  -Monitor for improvement w/ rehydration and resolution of BERTRAND

## 2021-02-14 NOTE — PROGRESS NOTE ADULT - SUBJECTIVE AND OBJECTIVE BOX
OVERNIGHT EVENTS: Admitted. given additional 1L fluid in morning. Febrile to 102.8, given tylenol     SUBJECTIVE:  Patient seen and examined at bedside. Feels very weak and intermittently SOB. Feels very thirst. Has muscle soreness especially with movement. Denies fever/chills, CP, n/v/d, abd pain. 12 pt ROS otherwise negative.     Vital Signs Last 12 Hrs  T(F): 98.7 (02-14-21 @ 20:59), Max: 98.9 (02-14-21 @ 17:40)  HR: 115 (02-14-21 @ 20:59) (84 - 115)  BP: 114/7 (02-14-21 @ 20:59) (114/7 - 114/63)  BP(mean): --  RR: 20 (02-14-21 @ 20:59) (20 - 20)  SpO2: 96% (02-14-21 @ 20:59) (96% - 97%)  I&O's Summary    13 Feb 2021 07:01  -  14 Feb 2021 07:00  --------------------------------------------------------  IN: 1000 mL / OUT: 0 mL / NET: 1000 mL    14 Feb 2021 07:01  -  14 Feb 2021 21:13  --------------------------------------------------------  IN: 580 mL / OUT: 950 mL / NET: -370 mL        PHYSICAL EXAM:  Constitutional: NAD, comfortable in bed, slow speech  HEENT: NC/AT, hearing aids, PERRLA, EOMI, no conjunctival pallor or scleral icterus, MMM  Neck: Supple, no JVD  Respiratory: Crackles lower lobe fields, no tachypnea/accessory muscle use   Cardiovascular: RRR, normal S1 and S2, no m/r/g.   Gastrointestinal: distended abdomen, +BS, soft NTND, no guarding or rebound tenderness, no palpable masses   : no suprapubic or flank tenderness   Extremities: wwp; no cyanosis, clubbing or edema. all extremities moderately tender with deep pressure   Vascular: Pulses equal and strong throughout.   Neurological: AAOx3, no CN deficits, strength and sensation intact throughout. baseline bilateral hand tremor worse with intention  Skin: No gross skin abnormalities or rashes        LABS:                        12.8   5.01  )-----------( 122      ( 14 Feb 2021 08:51 )             38.2     02-14    133<L>  |  102  |  63<H>  ----------------------------<  107<H>  4.3   |  16<L>  |  3.14<H>    Ca    8.0<L>      14 Feb 2021 17:18  Phos  4.3     02-14  Mg     1.7     02-14    TPro  6.0  /  Alb  2.7<L>  /  TBili  0.5  /  DBili  x   /  AST  71<H>  /  ALT  38  /  AlkPhos  108  02-14            RADIOLOGY & ADDITIONAL TESTS:    MEDICATIONS  (STANDING):  azithromycin  IVPB 500 milliGRAM(s) IV Intermittent every 24 hours  cefTRIAXone   IVPB 1000 milliGRAM(s) IV Intermittent every 24 hours  folic acid 1 milliGRAM(s) Oral daily  multivitamin 1 Tablet(s) Oral daily  pantoprazole    Tablet 40 milliGRAM(s) Oral before breakfast  thiamine 100 milliGRAM(s) Oral daily    MEDICATIONS  (PRN):  acetaminophen   Tablet .. 650 milliGRAM(s) Oral every 4 hours PRN Temp greater or equal to 38C (100.4F), Mild Pain (1 - 3)  LORazepam   Injectable 1 milliGRAM(s) IV Push every 1 hour PRN CIWA-Ar score 8 or greater

## 2021-02-14 NOTE — PROGRESS NOTE ADULT - PROBLEM SELECTOR PLAN 8
Home medication: losartan 50mg qd  -/64 on admission  -Currently holding in setting of BERTRAND and sepsis

## 2021-02-14 NOTE — PROGRESS NOTE ADULT - PROBLEM SELECTOR PLAN 3
Febrile to 102 in ED, w/ significantly elevated procal 67.29.  -CXR w/o focal consolidation, though crackles bilaterally  -S/p ceftriaxone 1g, azithromycin 500mg in ED  Plan:  -c/w ceftriaxone and azithromycin for CAP coverage     -f/u urine legionella and urine strep to eval for atypical pna    -f/u sputum clx    -f/u blood cultures obtained in ED

## 2021-02-14 NOTE — DIETITIAN INITIAL EVALUATION ADULT. - ADD RECOMMEND
1. Add MVI, folic acid, thiamine 2. Add Ensure Enlive BID (700 kcal, 40g protein, 360 mL free H2O) 3. Monitor and replete lytes 4. Trend wts

## 2021-02-14 NOTE — DIETITIAN INITIAL EVALUATION ADULT. - PROBLEM SELECTOR PLAN 4
Pt with hx of PE, per daughter ~5yrs ago. On xarelto 10mg qd for indefinite treatment of PE. No hx of malignancy, immobilization, or other risk factors, likely unprovoked.  -Last dose 2/13AM  -Xarelto contraindicated in setting of BERTRAND. Unclear if pt requires AC 2/2 PE as it is remote, however pt at increased risk for DVT due to covid-19 infection.  -Will start heparin gtt in AM (at time of next xarelto dose) for full anticoagulation (PE + risk of DVT)

## 2021-02-15 DIAGNOSIS — A41.9 SEPSIS, UNSPECIFIED ORGANISM: ICD-10-CM

## 2021-02-15 LAB
ALBUMIN SERPL ELPH-MCNC: 2.4 G/DL — LOW (ref 3.3–5)
ALBUMIN SERPL ELPH-MCNC: 3.3 G/DL — SIGNIFICANT CHANGE UP (ref 3.3–5)
ALP SERPL-CCNC: 187 U/L — HIGH (ref 40–120)
ALP SERPL-CCNC: 223 U/L — HIGH (ref 40–120)
ALT FLD-CCNC: 33 U/L — SIGNIFICANT CHANGE UP (ref 10–45)
ALT FLD-CCNC: 44 U/L — SIGNIFICANT CHANGE UP (ref 10–45)
ANION GAP SERPL CALC-SCNC: 15 MMOL/L — SIGNIFICANT CHANGE UP (ref 5–17)
ANION GAP SERPL CALC-SCNC: 18 MMOL/L — HIGH (ref 5–17)
APPEARANCE UR: CLEAR — SIGNIFICANT CHANGE UP
APTT BLD: 124.3 SEC — CRITICAL HIGH (ref 27.5–35.5)
APTT BLD: 34.4 SEC — SIGNIFICANT CHANGE UP (ref 27.5–35.5)
APTT BLD: 45 SEC — HIGH (ref 27.5–35.5)
APTT BLD: >200 SEC — CRITICAL HIGH (ref 27.5–35.5)
APTT BLD: >200 SEC — CRITICAL HIGH (ref 27.5–35.5)
AST SERPL-CCNC: 70 U/L — HIGH (ref 10–40)
AST SERPL-CCNC: 87 U/L — HIGH (ref 10–40)
BACTERIA # UR AUTO: PRESENT /HPF
BASOPHILS # BLD AUTO: 0 K/UL — SIGNIFICANT CHANGE UP (ref 0–0.2)
BASOPHILS # BLD AUTO: 0 K/UL — SIGNIFICANT CHANGE UP (ref 0–0.2)
BASOPHILS NFR BLD AUTO: 0 % — SIGNIFICANT CHANGE UP (ref 0–2)
BASOPHILS NFR BLD AUTO: 0 % — SIGNIFICANT CHANGE UP (ref 0–2)
BILIRUB SERPL-MCNC: 0.6 MG/DL — SIGNIFICANT CHANGE UP (ref 0.2–1.2)
BILIRUB SERPL-MCNC: 0.8 MG/DL — SIGNIFICANT CHANGE UP (ref 0.2–1.2)
BILIRUB UR-MCNC: NEGATIVE — SIGNIFICANT CHANGE UP
BUN SERPL-MCNC: 64 MG/DL — HIGH (ref 7–23)
BUN SERPL-MCNC: 64 MG/DL — HIGH (ref 7–23)
CALCIUM SERPL-MCNC: 8 MG/DL — LOW (ref 8.4–10.5)
CALCIUM SERPL-MCNC: 9 MG/DL — SIGNIFICANT CHANGE UP (ref 8.4–10.5)
CHLORIDE SERPL-SCNC: 103 MMOL/L — SIGNIFICANT CHANGE UP (ref 96–108)
CHLORIDE SERPL-SCNC: 97 MMOL/L — SIGNIFICANT CHANGE UP (ref 96–108)
CK MB CFR SERPL CALC: 2.1 NG/ML — SIGNIFICANT CHANGE UP (ref 0–6.7)
CK SERPL-CCNC: 1754 U/L — HIGH (ref 30–200)
CO2 SERPL-SCNC: 15 MMOL/L — LOW (ref 22–31)
CO2 SERPL-SCNC: 18 MMOL/L — LOW (ref 22–31)
COLOR SPEC: YELLOW — SIGNIFICANT CHANGE UP
COMMENT - URINE: SIGNIFICANT CHANGE UP
CREAT SERPL-MCNC: 3.14 MG/DL — HIGH (ref 0.5–1.3)
CREAT SERPL-MCNC: 3.31 MG/DL — HIGH (ref 0.5–1.3)
CRP SERPL-MCNC: 31.4 MG/DL — HIGH (ref 0–0.4)
D DIMER BLD IA.RAPID-MCNC: 744 NG/ML DDU — HIGH
DIFF PNL FLD: ABNORMAL
EOSINOPHIL # BLD AUTO: 0 K/UL — SIGNIFICANT CHANGE UP (ref 0–0.5)
EOSINOPHIL # BLD AUTO: 0 K/UL — SIGNIFICANT CHANGE UP (ref 0–0.5)
EOSINOPHIL NFR BLD AUTO: 0 % — SIGNIFICANT CHANGE UP (ref 0–6)
EOSINOPHIL NFR BLD AUTO: 0 % — SIGNIFICANT CHANGE UP (ref 0–6)
EPI CELLS # UR: SIGNIFICANT CHANGE UP /HPF (ref 0–5)
FERRITIN SERPL-MCNC: 3318 NG/ML — HIGH (ref 30–400)
GAS PNL BLDA: SIGNIFICANT CHANGE UP
GIANT PLATELETS BLD QL SMEAR: PRESENT — SIGNIFICANT CHANGE UP
GLUCOSE BLDC GLUCOMTR-MCNC: 186 MG/DL — HIGH (ref 70–99)
GLUCOSE SERPL-MCNC: 106 MG/DL — HIGH (ref 70–99)
GLUCOSE SERPL-MCNC: 127 MG/DL — HIGH (ref 70–99)
GLUCOSE UR QL: NEGATIVE — SIGNIFICANT CHANGE UP
GRAN CASTS # UR COMP ASSIST: ABNORMAL /LPF
HCT VFR BLD CALC: 41.4 % — SIGNIFICANT CHANGE UP (ref 39–50)
HCT VFR BLD CALC: 43.9 % — SIGNIFICANT CHANGE UP (ref 39–50)
HGB BLD-MCNC: 13.4 G/DL — SIGNIFICANT CHANGE UP (ref 13–17)
HGB BLD-MCNC: 14.6 G/DL — SIGNIFICANT CHANGE UP (ref 13–17)
HYALINE CASTS # UR AUTO: SIGNIFICANT CHANGE UP /LPF (ref 0–2)
INR BLD: 1.01 — SIGNIFICANT CHANGE UP (ref 0.88–1.16)
KETONES UR-MCNC: NEGATIVE — SIGNIFICANT CHANGE UP
LACTATE SERPL-SCNC: 1.7 MMOL/L — SIGNIFICANT CHANGE UP (ref 0.5–2)
LEUKOCYTE ESTERASE UR-ACNC: NEGATIVE — SIGNIFICANT CHANGE UP
LYMPHOCYTES # BLD AUTO: 0.17 K/UL — LOW (ref 1–3.3)
LYMPHOCYTES # BLD AUTO: 0.24 K/UL — LOW (ref 1–3.3)
LYMPHOCYTES # BLD AUTO: 3.5 % — LOW (ref 13–44)
LYMPHOCYTES # BLD AUTO: 3.5 % — LOW (ref 13–44)
MAGNESIUM SERPL-MCNC: 2.2 MG/DL — SIGNIFICANT CHANGE UP (ref 1.6–2.6)
MAGNESIUM SERPL-MCNC: 2.3 MG/DL — SIGNIFICANT CHANGE UP (ref 1.6–2.6)
MANUAL SMEAR VERIFICATION: SIGNIFICANT CHANGE UP
MCHC RBC-ENTMCNC: 32.3 PG — SIGNIFICANT CHANGE UP (ref 27–34)
MCHC RBC-ENTMCNC: 32.4 GM/DL — SIGNIFICANT CHANGE UP (ref 32–36)
MCHC RBC-ENTMCNC: 32.4 PG — SIGNIFICANT CHANGE UP (ref 27–34)
MCHC RBC-ENTMCNC: 33.3 GM/DL — SIGNIFICANT CHANGE UP (ref 32–36)
MCV RBC AUTO: 100.2 FL — HIGH (ref 80–100)
MCV RBC AUTO: 97.1 FL — SIGNIFICANT CHANGE UP (ref 80–100)
MONOCYTES # BLD AUTO: 0.12 K/UL — SIGNIFICANT CHANGE UP (ref 0–0.9)
MONOCYTES # BLD AUTO: 0.17 K/UL — SIGNIFICANT CHANGE UP (ref 0–0.9)
MONOCYTES NFR BLD AUTO: 1.8 % — LOW (ref 2–14)
MONOCYTES NFR BLD AUTO: 3.5 % — SIGNIFICANT CHANGE UP (ref 2–14)
MRSA PCR RESULT.: NEGATIVE — SIGNIFICANT CHANGE UP
NEUTROPHILS # BLD AUTO: 4.34 K/UL — SIGNIFICANT CHANGE UP (ref 1.8–7.4)
NEUTROPHILS # BLD AUTO: 6.52 K/UL — SIGNIFICANT CHANGE UP (ref 1.8–7.4)
NEUTROPHILS NFR BLD AUTO: 77.4 % — HIGH (ref 43–77)
NEUTROPHILS NFR BLD AUTO: 80.7 % — HIGH (ref 43–77)
NEUTS BAND # BLD: 13 % — HIGH (ref 0–8)
NITRITE UR-MCNC: NEGATIVE — SIGNIFICANT CHANGE UP
PH UR: 6 — SIGNIFICANT CHANGE UP (ref 5–8)
PHOSPHATE SERPL-MCNC: 3 MG/DL — SIGNIFICANT CHANGE UP (ref 2.5–4.5)
PHOSPHATE SERPL-MCNC: 4.6 MG/DL — HIGH (ref 2.5–4.5)
PLAT MORPH BLD: NORMAL — SIGNIFICANT CHANGE UP
PLATELET # BLD AUTO: 120 K/UL — LOW (ref 150–400)
PLATELET # BLD AUTO: 140 K/UL — LOW (ref 150–400)
POTASSIUM SERPL-MCNC: 4.4 MMOL/L — SIGNIFICANT CHANGE UP (ref 3.5–5.3)
POTASSIUM SERPL-MCNC: 4.4 MMOL/L — SIGNIFICANT CHANGE UP (ref 3.5–5.3)
POTASSIUM SERPL-SCNC: 4.4 MMOL/L — SIGNIFICANT CHANGE UP (ref 3.5–5.3)
POTASSIUM SERPL-SCNC: 4.4 MMOL/L — SIGNIFICANT CHANGE UP (ref 3.5–5.3)
PROT SERPL-MCNC: 6.2 G/DL — SIGNIFICANT CHANGE UP (ref 6–8.3)
PROT SERPL-MCNC: 7.5 G/DL — SIGNIFICANT CHANGE UP (ref 6–8.3)
PROT UR-MCNC: 30 MG/DL
PROTHROM AB SERPL-ACNC: 12.1 SEC — SIGNIFICANT CHANGE UP (ref 10.6–13.6)
RBC # BLD: 4.13 M/UL — LOW (ref 4.2–5.8)
RBC # BLD: 4.52 M/UL — SIGNIFICANT CHANGE UP (ref 4.2–5.8)
RBC # FLD: 14 % — SIGNIFICANT CHANGE UP (ref 10.3–14.5)
RBC # FLD: 14.4 % — SIGNIFICANT CHANGE UP (ref 10.3–14.5)
RBC BLD AUTO: NORMAL — SIGNIFICANT CHANGE UP
RBC CASTS # UR COMP ASSIST: > 10 /HPF
S AUREUS DNA NOSE QL NAA+PROBE: NEGATIVE — SIGNIFICANT CHANGE UP
SODIUM SERPL-SCNC: 133 MMOL/L — LOW (ref 135–145)
SODIUM SERPL-SCNC: 133 MMOL/L — LOW (ref 135–145)
SP GR SPEC: >=1.03 — SIGNIFICANT CHANGE UP (ref 1–1.03)
T4 AB SER-ACNC: 5.93 UG/DL — SIGNIFICANT CHANGE UP (ref 3.17–11.72)
TROPONIN T SERPL-MCNC: 0.03 NG/ML — HIGH (ref 0–0.01)
TROPONIN T SERPL-MCNC: 0.05 NG/ML — CRITICAL HIGH (ref 0–0.01)
TSH SERPL-MCNC: 0.87 UIU/ML — SIGNIFICANT CHANGE UP (ref 0.35–4.94)
URATE CRY FLD QL MICRO: ABNORMAL /HPF
UROBILINOGEN FLD QL: 0.2 E.U./DL — SIGNIFICANT CHANGE UP
VARIANT LYMPHS # BLD: 2.6 % — SIGNIFICANT CHANGE UP (ref 0–6)
WBC # BLD: 4.8 K/UL — SIGNIFICANT CHANGE UP (ref 3.8–10.5)
WBC # BLD: 6.88 K/UL — SIGNIFICANT CHANGE UP (ref 3.8–10.5)
WBC # FLD AUTO: 4.8 K/UL — SIGNIFICANT CHANGE UP (ref 3.8–10.5)
WBC # FLD AUTO: 6.88 K/UL — SIGNIFICANT CHANGE UP (ref 3.8–10.5)
WBC UR QL: < 5 /HPF — SIGNIFICANT CHANGE UP

## 2021-02-15 PROCEDURE — 36600 WITHDRAWAL OF ARTERIAL BLOOD: CPT | Mod: 59

## 2021-02-15 PROCEDURE — 71045 X-RAY EXAM CHEST 1 VIEW: CPT | Mod: 26

## 2021-02-15 PROCEDURE — 99291 CRITICAL CARE FIRST HOUR: CPT | Mod: CS

## 2021-02-15 RX ORDER — CHLORHEXIDINE GLUCONATE 213 G/1000ML
1 SOLUTION TOPICAL
Refills: 0 | Status: DISCONTINUED | OUTPATIENT
Start: 2021-02-15 | End: 2021-02-22

## 2021-02-15 RX ORDER — AMIODARONE HYDROCHLORIDE 400 MG/1
0.5 TABLET ORAL
Qty: 900 | Refills: 0 | Status: DISCONTINUED | OUTPATIENT
Start: 2021-02-15 | End: 2021-02-15

## 2021-02-15 RX ORDER — PHENYLEPHRINE HYDROCHLORIDE 10 MG/ML
0.1 INJECTION INTRAVENOUS
Qty: 160 | Refills: 0 | Status: DISCONTINUED | OUTPATIENT
Start: 2021-02-15 | End: 2021-02-15

## 2021-02-15 RX ORDER — AMIODARONE HYDROCHLORIDE 400 MG/1
0.5 TABLET ORAL
Qty: 900 | Refills: 0 | Status: DISCONTINUED | OUTPATIENT
Start: 2021-02-15 | End: 2021-02-16

## 2021-02-15 RX ORDER — PIPERACILLIN AND TAZOBACTAM 4; .5 G/20ML; G/20ML
4.5 INJECTION, POWDER, LYOPHILIZED, FOR SOLUTION INTRAVENOUS EVERY 12 HOURS
Refills: 0 | Status: DISCONTINUED | OUTPATIENT
Start: 2021-02-15 | End: 2021-02-18

## 2021-02-15 RX ORDER — METOPROLOL TARTRATE 50 MG
25 TABLET ORAL EVERY 12 HOURS
Refills: 0 | Status: DISCONTINUED | OUTPATIENT
Start: 2021-02-15 | End: 2021-02-17

## 2021-02-15 RX ORDER — VANCOMYCIN HCL 1 G
1250 VIAL (EA) INTRAVENOUS ONCE
Refills: 0 | Status: COMPLETED | OUTPATIENT
Start: 2021-02-15 | End: 2021-02-15

## 2021-02-15 RX ORDER — SODIUM CHLORIDE 9 MG/ML
1000 INJECTION, SOLUTION INTRAVENOUS
Refills: 0 | Status: DISCONTINUED | OUTPATIENT
Start: 2021-02-15 | End: 2021-02-15

## 2021-02-15 RX ORDER — METOPROLOL TARTRATE 50 MG
2.5 TABLET ORAL ONCE
Refills: 0 | Status: COMPLETED | OUTPATIENT
Start: 2021-02-15 | End: 2021-02-15

## 2021-02-15 RX ORDER — SODIUM BICARBONATE 1 MEQ/ML
1.84 SYRINGE (ML) INTRAVENOUS
Qty: 150 | Refills: 0 | Status: COMPLETED | OUTPATIENT
Start: 2021-02-15 | End: 2021-02-15

## 2021-02-15 RX ORDER — HEPARIN SODIUM 5000 [USP'U]/ML
500 INJECTION INTRAVENOUS; SUBCUTANEOUS
Qty: 25000 | Refills: 0 | Status: DISCONTINUED | OUTPATIENT
Start: 2021-02-15 | End: 2021-02-16

## 2021-02-15 RX ORDER — SODIUM CHLORIDE 9 MG/ML
1000 INJECTION, SOLUTION INTRAVENOUS ONCE
Refills: 0 | Status: COMPLETED | OUTPATIENT
Start: 2021-02-15 | End: 2021-02-15

## 2021-02-15 RX ORDER — SODIUM BICARBONATE 1 MEQ/ML
1.84 SYRINGE (ML) INTRAVENOUS
Qty: 150 | Refills: 0 | Status: DISCONTINUED | OUTPATIENT
Start: 2021-02-15 | End: 2021-02-15

## 2021-02-15 RX ORDER — AMIODARONE HYDROCHLORIDE 400 MG/1
1 TABLET ORAL
Qty: 900 | Refills: 0 | Status: DISCONTINUED | OUTPATIENT
Start: 2021-02-15 | End: 2021-02-15

## 2021-02-15 RX ORDER — DEXAMETHASONE 0.5 MG/5ML
6 ELIXIR ORAL EVERY 24 HOURS
Refills: 0 | Status: DISCONTINUED | OUTPATIENT
Start: 2021-02-15 | End: 2021-02-23

## 2021-02-15 RX ORDER — PHENYLEPHRINE HYDROCHLORIDE 10 MG/ML
0.1 INJECTION INTRAVENOUS
Qty: 40 | Refills: 0 | Status: DISCONTINUED | OUTPATIENT
Start: 2021-02-15 | End: 2021-02-15

## 2021-02-15 RX ORDER — AMIODARONE HYDROCHLORIDE 400 MG/1
150 TABLET ORAL ONCE
Refills: 0 | Status: DISCONTINUED | OUTPATIENT
Start: 2021-02-15 | End: 2021-02-15

## 2021-02-15 RX ORDER — AMIODARONE HYDROCHLORIDE 400 MG/1
1 TABLET ORAL
Qty: 900 | Refills: 0 | Status: DISCONTINUED | OUTPATIENT
Start: 2021-02-15 | End: 2021-02-16

## 2021-02-15 RX ORDER — AMIODARONE HYDROCHLORIDE 400 MG/1
150 TABLET ORAL ONCE
Refills: 0 | Status: COMPLETED | OUTPATIENT
Start: 2021-02-15 | End: 2021-02-15

## 2021-02-15 RX ORDER — HEPARIN SODIUM 5000 [USP'U]/ML
1500 INJECTION INTRAVENOUS; SUBCUTANEOUS
Qty: 25000 | Refills: 0 | Status: DISCONTINUED | OUTPATIENT
Start: 2021-02-15 | End: 2021-02-15

## 2021-02-15 RX ADMIN — HEPARIN SODIUM 5 UNIT(S)/HR: 5000 INJECTION INTRAVENOUS; SUBCUTANEOUS at 22:01

## 2021-02-15 RX ADMIN — AMIODARONE HYDROCHLORIDE 33.3 MG/MIN: 400 TABLET ORAL at 06:32

## 2021-02-15 RX ADMIN — SODIUM CHLORIDE 1000 MILLILITER(S): 9 INJECTION INTRAMUSCULAR; INTRAVENOUS; SUBCUTANEOUS at 00:56

## 2021-02-15 RX ADMIN — Medication 100 MILLIGRAM(S): at 11:53

## 2021-02-15 RX ADMIN — HEPARIN SODIUM 15 UNIT(S)/HR: 5000 INJECTION INTRAVENOUS; SUBCUTANEOUS at 01:28

## 2021-02-15 RX ADMIN — Medication 2.5 MILLIGRAM(S): at 02:51

## 2021-02-15 RX ADMIN — Medication 1 MILLIGRAM(S): at 11:53

## 2021-02-15 RX ADMIN — PANTOPRAZOLE SODIUM 40 MILLIGRAM(S): 20 TABLET, DELAYED RELEASE ORAL at 08:37

## 2021-02-15 RX ADMIN — PIPERACILLIN AND TAZOBACTAM 200 GRAM(S): 4; .5 INJECTION, POWDER, LYOPHILIZED, FOR SOLUTION INTRAVENOUS at 14:12

## 2021-02-15 RX ADMIN — Medication 6 MILLIGRAM(S): at 02:03

## 2021-02-15 RX ADMIN — Medication 166.67 MILLIGRAM(S): at 09:41

## 2021-02-15 RX ADMIN — AMIODARONE HYDROCHLORIDE 600 MILLIGRAM(S): 400 TABLET ORAL at 06:31

## 2021-02-15 RX ADMIN — CHLORHEXIDINE GLUCONATE 1 APPLICATION(S): 213 SOLUTION TOPICAL at 17:49

## 2021-02-15 RX ADMIN — Medication 25 MILLIGRAM(S): at 02:56

## 2021-02-15 RX ADMIN — Medication 6 MILLIGRAM(S): at 23:28

## 2021-02-15 RX ADMIN — Medication 1 TABLET(S): at 11:53

## 2021-02-15 RX ADMIN — Medication 1000 MEQ/KG/HR: at 18:22

## 2021-02-15 RX ADMIN — PIPERACILLIN AND TAZOBACTAM 200 GRAM(S): 4; .5 INJECTION, POWDER, LYOPHILIZED, FOR SOLUTION INTRAVENOUS at 01:27

## 2021-02-15 NOTE — PROGRESS NOTE ADULT - PROBLEM SELECTOR PLAN 4
Febrile to 102 in ED, w/ significantly elevated procal 67.29.  -CXR w/o focal consolidation, though crackles bilaterally  -S/p ceftriaxone 1g, azithromycin 500mg in ED  Plan:  -c/w ceftriaxone and azithromycin for CAP coverage   - Add zosyn    -f/u urine legionella and urine strep to eval for atypical pna    -f/u sputum Cx    -f/u repeat BCx 2/15

## 2021-02-15 NOTE — PROGRESS NOTE ADULT - PROBLEM SELECTOR PLAN 3
-COVID PCR positive (2/13/21)  -CXR w/ diffuse infiltrates b/l  -O2 requirements: 95% on 2L nc  -Covid labs: CRP 33.21, ferritin 1853  - not eligible for remdesivir based on O2sat and BERTRAND. Procalcitonin significantly elevated, procalcitonin 67.29  - new overnight high fevers up to 106F s/p acetaminophen 650mg PO x1 and rectal x1  - started on IV decadron 6mg qd x 10 day given new O2 requirement -COVID PCR positive (2/13/21)  -CXR w/ diffuse infiltrates b/l  -Covid labs: CRP 33.21, ferritin 1853  - initially on RA, now on 2-4L NC  - not eligible for remdesivir based on O2sat and BERTRAND. Procalcitonin significantly elevated, procalcitonin 67.29  - new overnight high fevers up to 106F s/p acetaminophen 650mg PO x1 and rectal x1  - started on IV decadron 6mg qd x 10 day given new O2 requirement

## 2021-02-15 NOTE — PROGRESS NOTE ADULT - PROBLEM SELECTOR PLAN 3
-COVID PCR positive (2/13/21)  -CXR w/ diffuse infiltrates b/l  -O2 requirements: 95% on 2L nc  -Covid labs: CRP 33.21, ferritin 1853  - not eligible for remdesivir based on O2sat and BERTRAND. Procalcitonin significantly elevated, procalcitonin 67.29  - new overnight high fevers up to 106F s/p acetaminophen 650mg PO x1 and rectal x1  - started on IV decadron 6mg qd x 10 day given new O2 requirement

## 2021-02-15 NOTE — PROGRESS NOTE ADULT - PROBLEM SELECTOR PLAN 5
Pt with hx of PE, per daughter ~5yrs ago. On xarelto 10mg qd for indefinite treatment of PE. No hx of malignancy, immobilization, or other risk factors, likely unprovoked. xarelto contraindicated in BERTRAND.   - c/w hep gtt  - f/u AM PTT Pt with hx of PE, per daughter ~5yrs ago. On xarelto 10mg qd for indefinite treatment of PE. No hx of malignancy, immobilization, or other risk factors, likely unprovoked. Xarelto held on admission given BERTRAND.   - c/w hep gtt (full AC)  - f/u AM PTT  - f/u official TTE

## 2021-02-15 NOTE — CHART NOTE - NSCHARTNOTEFT_GEN_A_CORE
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 He has also had intermittent fevers with Tmax 102 at home. He denies recent travel or sick contacts. ROS negative for chest pain or palpitations, shortness of breath, abd pain.    He has had a baseline tremor for over a decade, worse with intention. He has been to neurologists for evaluation, no underlying etiology identified. He self-medicates with vodka, he takes 2 shots of vodka every morning one martini at night with improvement of his symptoms. There has been no increase in the amount of vodka he drinks during acute worsening of tremor. At baseline pt is independent in ADLS, lives alone. Per daughter, very dehydrated. Noticed that his tremors were worse in the AM when waking up.     In the ED,  Vitals: T98.1, HR 85, /64, RR 17, O2sat 95% on room air  Labs: Na 133, HCO3 18, BUN 54, Cr 2.93 | trop T 0.02, BNP 3014 | lactate 3.1    CRP 33.21, ferritin 1853 | procalcitonin 67.29    UA: pending    VBG: pH 7.36, pCO2 36  Imaging:    EKG: LAD, HR 85, bifasciular block (LAFB + RBBB)    CXR: diffuse infiltrates bilaterally  Interventions: ceftriaxone 1g, azithromycin 500mg, 1L IV NS. Repeat lactate after fluids 1.7.    (13 Feb 2021 20:25)    Tonight a RRT was called for a fever of 105.0. Was given 650 mg of Oral APAP at 2300. RRT called for elevated MEWS score. Patient found awake, alert, per primary team at bedside more sleepy but baseline mentation.     Review of systems: Without any major complaints at this time, slow to respond.     Allergies    No Known Allergies    Intolerances    PAST MEDICAL & SURGICAL HISTORY:  Chronic kidney disease (CKD)    Pulmonary embolism    Mitral valve prolapse    Essential tremor    H/O hemorrhoidectomy    H/O right inguinal hernia repair    Vital Signs Last 24 Hrs  T(C): 40.1 (15 Feb 2021 00:53), Max: 40.9 (14 Feb 2021 23:42)  T(F): 104.2 (15 Feb 2021 00:53), Max: 105.6 (14 Feb 2021 23:42)  HR: 100 (15 Feb 2021 00:53) (79 - 115)  BP: 107/73 (15 Feb 2021 00:53) (92/61 - 120/69)  BP(mean): 75 (14 Feb 2021 03:41) (75 - 75)  RR: 20 (15 Feb 2021 00:53) (18 - 23)  SpO2: 95% (15 Feb 2021 00:53) (93% - 97%)    Physical Exam:   GENERAL: Elderly male lying supine in bed in no apparent distress.    HEENT: HEENT intact. Mucus membranes dry. +PERRLA. EOMI. Hearing aids in, hard of hearing. Per primary team baseline slow to respond. Trachea midline. No JVD.   LUNGS: Good bilateral chest rise. LS diminished bilaterally slight bilateral basilar crackles.   HEART: Irregular rate and rhythm. A-Fib on telemetry.   ABDOMEN: Distended, but soft, non-tender.   EXTREMITIES: Without any cyanosis, clubbing, rash, lesions or edema.  SKIN: No new rashes or lesions. Hot to touch.  MSK: +CMS X 4. No clonus.   VASCULAR: +1-+2 edema bilateral lower extremities.   NEUROLOGIC: Slow to respond, but per primary team baseline for patient.     02-13 @ 07:01 - 02-14 @ 07:00  --------------------------------------------------------  IN: 1000 mL / OUT: 0 mL / NET: 1000 mL    02-14 @ 07:01  -  02-15 @ 00:58  --------------------------------------------------------  IN: 580 mL / OUT: 1400 mL / NET: -820 mL                        14.6   4.80  )-----------( 140      ( 15 Feb 2021 00:21 )             43.9     02-14    133<L>  |  102  |  63<H>  ----------------------------<  107<H>  4.3   |  16<L>  |  3.14<H>    Ca    9.0      15 Feb 2021 00:21  Phos  3.0     02-15  Mg     2.2     02-15    TPro  6.0  /  Alb  2.7<L>  /  TBili  0.5  /  DBili  x   /  AST  71<H>  /  ALT  38  /  AlkPhos  108  02-14    ABG - ( 15 Feb 2021 00:15 )  pH, Arterial: 7.51  pH, Blood: x     /  pCO2: 16    /  pO2: 63    / HCO3: 13    / Base Excess: -6.9  /  SaO2: 93             LIVER FUNCTIONS - ( 14 Feb 2021 08:51 )  Alb: 2.7 g/dL / Pro: 6.0 g/dL / ALK PHOS: 108 U/L / ALT: 38 U/L / AST: 71 U/L / GGT: x              PT/INR - ( 15 Feb 2021 00:21 )   PT: 12.1 sec;   INR: 1.01          PTT - ( 15 Feb 2021 00:21 )  PTT:34.4 sec    MEDICATIONS  (STANDING):  acetaminophen  IVPB .. 1000 milliGRAM(s) IV Intermittent once  folic acid 1 milliGRAM(s) Oral daily  multivitamin 1 Tablet(s) Oral daily  pantoprazole    Tablet 40 milliGRAM(s) Oral before breakfast  piperacillin/tazobactam IVPB. 3.375 Gram(s) IV Intermittent once  piperacillin/tazobactam IVPB.. 3.375 Gram(s) IV Intermittent every 12 hours  sodium chloride 0.9%. 1000 milliLiter(s) (1000 mL/Hr) IV Continuous <Continuous>  thiamine 100 milliGRAM(s) Oral daily    MEDICATIONS  (PRN):  acetaminophen   Tablet .. 650 milliGRAM(s) Oral every 4 hours PRN Temp greater or equal to 38C (100.4F), Mild Pain (1 - 3)  LORazepam   Injectable 1 milliGRAM(s) IV Push every 1 hour PRN LISETHWA-Ar score 8 or greater    Assessment- Rapid Response called for 85y year old Male with a past medical history of bilateral PE (on xarelto), mitral valve prolapse, and CKD (reports baseline GFR 30) who presents to [facility] ED c/o 5d of NBNB diarrhea, dehydration, weakness, myalgias, dry cough, and worsening of his baseline tremor now status post RRT for elevation of MEWS score for fever of 106 rectally.    -Blood Cultures X 2.   -Elevated Procalcitonin.  -Added on Zosyn.  -Status post APAP at 2300. Given additional 650 mg of APAP HI during RRT.   -Active cooling, including ice bath, ice packs to groin and central pulses.   -Place patient on cooling blanket.   -STAT EKG.  -Lactate 1.7  -STAT CXR.  -Follow up CK, CMP.   -Telemetry monitoring.   -Strict I and O's.   -Maintain 2 large bore IV Accesses.   -[facility]    Above discussed with primary team, Intensivist on call, and PCCM Fellow. ***Rapid Response Clinical Impact Nurse Practitioner Note***    Patient is a 85y old  Male with a PMHx of bilateral PE (on xarelto), mitral valve prolapse, and CKD (reports baseline GFR 30) who presents to [facility] ED c/o 5d of NBNB diarrhea, dehydration, weakness, myalgias, dry cough, and worsening of his baseline tremor. He has also had intermittent fevers with Tmax 102 at home. He denies recent travel or sick contacts. ROS negative for chest pain or palpitations, shortness of breath, abd pain.    He has had a baseline tremor for over a decade, worse with intention. He has been to neurologists for evaluation, no underlying etiology identified. He self-medicates with vodka, he takes 2 shots of vodka every morning one martini at night with improvement of his symptoms. There has been no increase in the amount of vodka he drinks during acute worsening of tremor. At baseline pt is independent in ADLS, lives alone. Per daughter, very dehydrated. Noticed that his tremors were worse in the AM when waking up.     In the ED,  Vitals: T98.1, HR 85, /64, RR 17, O2sat 95% on room air  Labs: Na 133, HCO3 18, BUN 54, Cr 2.93 | trop T 0.02, BNP 3014 | lactate 3.1    CRP 33.21, ferritin 1853 | procalcitonin 67.29    UA: pending    VBG: pH 7.36, pCO2 36  Imaging:    EKG: LAD, HR 85, bifasciular block (LAFB + RBBB)    CXR: diffuse infiltrates bilaterally  Interventions: ceftriaxone 1g, azithromycin 500mg, 1L IV NS. Repeat lactate after fluids 1.7.    (13 Feb 2021 20:25)    Tonight a RRT was called for a fever of 105.0. Was given 650 mg of Oral APAP at 2300. RRT called for elevated MEWS score. Patient found awake, alert, per primary team at bedside more sleepy but baseline mentation.     Review of systems: Without any major complaints at this time, slow to respond.     Allergies    No Known Allergies    Intolerances    PAST MEDICAL & SURGICAL HISTORY:  Chronic kidney disease (CKD)    Pulmonary embolism    Mitral valve prolapse    Essential tremor    H/O hemorrhoidectomy    H/O right inguinal hernia repair    Vital Signs Last 24 Hrs  T(C): 40.1 (15 Feb 2021 00:53), Max: 40.9 (14 Feb 2021 23:42)  T(F): 104.2 (15 Feb 2021 00:53), Max: 105.6 (14 Feb 2021 23:42)  HR: 100 (15 Feb 2021 00:53) (79 - 115)  BP: 107/73 (15 Feb 2021 00:53) (92/61 - 120/69)  BP(mean): 75 (14 Feb 2021 03:41) (75 - 75)  RR: 20 (15 Feb 2021 00:53) (18 - 23)  SpO2: 95% (15 Feb 2021 00:53) (93% - 97%)    Physical Exam:   GENERAL: Elderly male lying supine in bed in no apparent distress.    HEENT: HEENT intact. Mucus membranes dry. +PERRLA. EOMI. Hearing aids in, hard of hearing. Per primary team baseline slow to respond. Trachea midline. No JVD.   LUNGS: Good bilateral chest rise. LS diminished bilaterally slight bilateral basilar crackles.   HEART: Irregular rate and rhythm. A-Fib on telemetry.   ABDOMEN: Distended, but soft, non-tender.   EXTREMITIES: Without any cyanosis, clubbing, rash, lesions or edema.  SKIN: No new rashes or lesions. Hot to touch.  MSK: +CMS X 4. No clonus.   VASCULAR: +1-+2 edema bilateral lower extremities.   NEUROLOGIC: Slow to respond, but per primary team baseline for patient.     02-13 @ 07:01 - 02-14 @ 07:00  --------------------------------------------------------  IN: 1000 mL / OUT: 0 mL / NET: 1000 mL    02-14 @ 07:01  -  02-15 @ 00:58  --------------------------------------------------------  IN: 580 mL / OUT: 1400 mL / NET: -820 mL                        14.6   4.80  )-----------( 140      ( 15 Feb 2021 00:21 )             43.9     02-14    133<L>  |  102  |  63<H>  ----------------------------<  107<H>  4.3   |  16<L>  |  3.14<H>    Ca    9.0      15 Feb 2021 00:21  Phos  3.0     02-15  Mg     2.2     02-15    TPro  6.0  /  Alb  2.7<L>  /  TBili  0.5  /  DBili  x   /  AST  71<H>  /  ALT  38  /  AlkPhos  108  02-14    ABG - ( 15 Feb 2021 00:15 )  pH, Arterial: 7.51  pH, Blood: x     /  pCO2: 16    /  pO2: 63    / HCO3: 13    / Base Excess: -6.9  /  SaO2: 93             LIVER FUNCTIONS - ( 14 Feb 2021 08:51 )  Alb: 2.7 g/dL / Pro: 6.0 g/dL / ALK PHOS: 108 U/L / ALT: 38 U/L / AST: 71 U/L / GGT: x              PT/INR - ( 15 Feb 2021 00:21 )   PT: 12.1 sec;   INR: 1.01          PTT - ( 15 Feb 2021 00:21 )  PTT:34.4 sec    MEDICATIONS  (STANDING):  acetaminophen  IVPB .. 1000 milliGRAM(s) IV Intermittent once  folic acid 1 milliGRAM(s) Oral daily  multivitamin 1 Tablet(s) Oral daily  pantoprazole    Tablet 40 milliGRAM(s) Oral before breakfast  piperacillin/tazobactam IVPB. 3.375 Gram(s) IV Intermittent once  piperacillin/tazobactam IVPB.. 3.375 Gram(s) IV Intermittent every 12 hours  sodium chloride 0.9%. 1000 milliLiter(s) (1000 mL/Hr) IV Continuous <Continuous>  thiamine 100 milliGRAM(s) Oral daily    MEDICATIONS  (PRN):  acetaminophen   Tablet .. 650 milliGRAM(s) Oral every 4 hours PRN Temp greater or equal to 38C (100.4F), Mild Pain (1 - 3)  LORazepam   Injectable 1 milliGRAM(s) IV Push every 1 hour PRN LISETHWA-Ar score 8 or greater    Assessment- Rapid Response called for 85y year old Male with a past medical history of bilateral PE (on xarelto), mitral valve prolapse, and CKD (reports baseline GFR 30) who presents to [facility] ED c/o 5d of NBNB diarrhea, dehydration, weakness, myalgias, dry cough, and worsening of his baseline tremor now status post RRT for elevation of MEWS score for fever of 106 rectally.    -Blood Cultures X 2.   -Elevated Procalcitonin.  -Added on Zosyn.  -Status post APAP at 2300. Given additional 650 mg of APAP WA during RRT.   -Active cooling, including ice bath, ice packs to groin and central pulses.   -Place patient on cooling blanket.   -STAT EKG.  -Lactate 1.7  -STAT CXR.  -Follow up CK, CMP.   -Telemetry monitoring.   -Strict I and O's.   -Maintain 2 large bore IV Accesses.   -Patient to be transferred to [facility] Step[facility] Telemetry.  -Rest of plan per primary COVID Stepdown team.     Above discussed with primary team, Intensivist on call, and PCCM Fellow. ***Rapid Response Clinical Impact Nurse Practitioner Note***    Patient is a 85y old  Male with a PMHx of bilateral PE (on xarelto), mitral valve prolapse, and CKD (reports baseline GFR 30) who presents to [facility] ED c/o 5d of NBNB diarrhea, dehydration, weakness, myalgias, dry cough, and worsening of his baseline tremor. He has also had intermittent fevers with Tmax 102 at home. He denies recent travel or sick contacts. ROS negative for chest pain or palpitations, shortness of breath, abd pain.    He has had a baseline tremor for over a decade, worse with intention. He has been to neurologists for evaluation, no underlying etiology identified. He self-medicates with vodka, he takes 2 shots of vodka every morning one martini at night with improvement of his symptoms. There has been no increase in the amount of vodka he drinks during acute worsening of tremor. At baseline pt is independent in ADLS, lives alone. Per daughter, very dehydrated. Noticed that his tremors were worse in the AM when waking up.     In the ED,  Vitals: T98.1, HR 85, /64, RR 17, O2sat 95% on room air  Labs: Na 133, HCO3 18, BUN 54, Cr 2.93 | trop T 0.02, BNP 3014 | lactate 3.1    CRP 33.21, ferritin 1853 | procalcitonin 67.29    UA: pending    VBG: pH 7.36, pCO2 36  Imaging:    EKG: LAD, HR 85, bifasciular block (LAFB + RBBB)    CXR: diffuse infiltrates bilaterally  Interventions: ceftriaxone 1g, azithromycin 500mg, 1L IV NS. Repeat lactate after fluids 1.7.    (13 Feb 2021 20:25)    Tonight a RRT was called for a fever of 105.0. Was given 650 mg of Oral APAP at 2300. RRT called for elevated MEWS score. Patient found awake, alert, per primary team at bedside more sleepy but baseline mentation.     Review of systems: Without any major complaints at this time, slow to respond.     Allergies    No Known Allergies    Intolerances    PAST MEDICAL & SURGICAL HISTORY:  Chronic kidney disease (CKD)    Pulmonary embolism    Mitral valve prolapse    Essential tremor    H/O hemorrhoidectomy    H/O right inguinal hernia repair    Vital Signs Last 24 Hrs  T(C): 40.1 (15 Feb 2021 00:53), Max: 40.9 (14 Feb 2021 23:42)  T(F): 104.2 (15 Feb 2021 00:53), Max: 105.6 (14 Feb 2021 23:42)  HR: 100 (15 Feb 2021 00:53) (79 - 115)  BP: 107/73 (15 Feb 2021 00:53) (92/61 - 120/69)  BP(mean): 75 (14 Feb 2021 03:41) (75 - 75)  RR: 20 (15 Feb 2021 00:53) (18 - 23)  SpO2: 95% (15 Feb 2021 00:53) (93% - 97%)    Physical Exam:   GENERAL: Elderly male lying supine in bed in no apparent distress.    HEENT: HEENT intact. Mucus membranes dry. +PERRLA. EOMI. Hearing aids in, hard of hearing. Per primary team baseline slow to respond. Trachea midline. No JVD.   LUNGS: Good bilateral chest rise. LS diminished bilaterally slight bilateral basilar crackles.   HEART: Irregular rate and rhythm. A-Fib on telemetry.   ABDOMEN: Distended, but soft, non-tender.   EXTREMITIES: Without any cyanosis, clubbing, rash, lesions or edema.  SKIN: No new rashes or lesions. Hot to touch.  MSK: +CMS X 4. No clonus.   VASCULAR: +1-+2 edema bilateral lower extremities.   NEUROLOGIC: Slow to respond, but per primary team baseline for patient.     02-13 @ 07:01 - 02-14 @ 07:00  --------------------------------------------------------  IN: 1000 mL / OUT: 0 mL / NET: 1000 mL    02-14 @ 07:01  -  02-15 @ 00:58  --------------------------------------------------------  IN: 580 mL / OUT: 1400 mL / NET: -820 mL                        14.6   4.80  )-----------( 140      ( 15 Feb 2021 00:21 )             43.9     02-14    133<L>  |  102  |  63<H>  ----------------------------<  107<H>  4.3   |  16<L>  |  3.14<H>    Ca    9.0      15 Feb 2021 00:21  Phos  3.0     02-15  Mg     2.2     02-15    TPro  6.0  /  Alb  2.7<L>  /  TBili  0.5  /  DBili  x   /  AST  71<H>  /  ALT  38  /  AlkPhos  108  02-14    ABG - ( 15 Feb 2021 00:15 )  pH, Arterial: 7.51  pH, Blood: x     /  pCO2: 16    /  pO2: 63    / HCO3: 13    / Base Excess: -6.9  /  SaO2: 93             LIVER FUNCTIONS - ( 14 Feb 2021 08:51 )  Alb: 2.7 g/dL / Pro: 6.0 g/dL / ALK PHOS: 108 U/L / ALT: 38 U/L / AST: 71 U/L / GGT: x              PT/INR - ( 15 Feb 2021 00:21 )   PT: 12.1 sec;   INR: 1.01          PTT - ( 15 Feb 2021 00:21 )  PTT:34.4 sec    MEDICATIONS  (STANDING):  acetaminophen  IVPB .. 1000 milliGRAM(s) IV Intermittent once  folic acid 1 milliGRAM(s) Oral daily  multivitamin 1 Tablet(s) Oral daily  pantoprazole    Tablet 40 milliGRAM(s) Oral before breakfast  piperacillin/tazobactam IVPB. 3.375 Gram(s) IV Intermittent once  piperacillin/tazobactam IVPB.. 3.375 Gram(s) IV Intermittent every 12 hours  sodium chloride 0.9%. 1000 milliLiter(s) (1000 mL/Hr) IV Continuous <Continuous>  thiamine 100 milliGRAM(s) Oral daily    MEDICATIONS  (PRN):  acetaminophen   Tablet .. 650 milliGRAM(s) Oral every 4 hours PRN Temp greater or equal to 38C (100.4F), Mild Pain (1 - 3)  LORazepam   Injectable 1 milliGRAM(s) IV Push every 1 hour PRN LISETHWA-Ar score 8 or greater    Assessment- Rapid Response called for 85y year old Male with a past medical history of bilateral PE (on xarelto), mitral valve prolapse, and CKD (reports baseline GFR 30) who presents to [facility] ED c/o 5d of NBNB diarrhea, dehydration, weakness, myalgias, dry cough, and worsening of his baseline tremor now status post RRT for elevation of MEWS score for fever of 106 rectally.    -Blood Cultures X 2.   -Patient hyperthermic but awake, alert and oriented to baseline. Protecting airway. States feeling better.   -Elevated Procalcitonin.  -Added on Zosyn.  -Status post APAP at 2300. Given additional 650 mg of APAP IA during RRT.   -Active cooling, including ice bath, ice packs to groin and central pulses.   -Place patient on cooling blanket.   -STAT EKG.  -Lactate 1.7  -STAT CXR.  -Follow up CK, CMP.   -Telemetry monitoring.   -Strict I and O's.   -VS per Telemetry Protocol.   -Maintain 2 large bore IV Accesses.   -Patient to be transferred to COVID Stepdown Telemetry.  -Rest of plan per primary COVID Stepdown team.     Above discussed with primary team, Intensivist on call, and PCCM Fellow. ***Rapid Response Clinical Impact Nurse Practitioner Note***    Patient is a 85y old  Male with a PMHx of bilateral PE (on xarelto), mitral valve prolapse, and CKD (reports baseline GFR 30) who presents to [facility] ED c/o 5d of NBNB diarrhea, dehydration, weakness, myalgias, dry cough, and worsening of his baseline tremor. He has also had intermittent fevers with Tmax 102 at home. He denies recent travel or sick contacts. ROS negative for chest pain or palpitations, shortness of breath, abd pain.    He has had a baseline tremor for over a decade, worse with intention. He has been to neurologists for evaluation, no underlying etiology identified. He self-medicates with vodka, he takes 2 shots of vodka every morning one martini at night with improvement of his symptoms. There has been no increase in the amount of vodka he drinks during acute worsening of tremor. At baseline pt is independent in ADLS, lives alone. Per daughter, very dehydrated. Noticed that his tremors were worse in the AM when waking up.     In the ED,  Vitals: T98.1, HR 85, /64, RR 17, O2sat 95% on room air  Labs: Na 133, HCO3 18, BUN 54, Cr 2.93 | trop T 0.02, BNP 3014 | lactate 3.1    CRP 33.21, ferritin 1853 | procalcitonin 67.29    UA: pending    VBG: pH 7.36, pCO2 36  Imaging:    EKG: LAD, HR 85, bifasciular block (LAFB + RBBB)    CXR: diffuse infiltrates bilaterally  Interventions: ceftriaxone 1g, azithromycin 500mg, 1L IV NS. Repeat lactate after fluids 1.7.    (13 Feb 2021 20:25)    Tonight a RRT was called for a fever of 105.0. Was given 650 mg of Oral APAP at 2300. RRT called for elevated MEWS score. Patient found awake, alert, per primary team at bedside more sleepy but baseline mentation.     Review of systems: Without any major complaints at this time, slow to respond.     Allergies    No Known Allergies    Intolerances    PAST MEDICAL & SURGICAL HISTORY:  Chronic kidney disease (CKD)    Pulmonary embolism    Mitral valve prolapse    Essential tremor    H/O hemorrhoidectomy    H/O right inguinal hernia repair    Vital Signs Last 24 Hrs  T(C): 40.1 (15 Feb 2021 00:53), Max: 40.9 (14 Feb 2021 23:42)  T(F): 104.2 (15 Feb 2021 00:53), Max: 105.6 (14 Feb 2021 23:42)  HR: 100 (15 Feb 2021 00:53) (79 - 115)  BP: 107/73 (15 Feb 2021 00:53) (92/61 - 120/69)  BP(mean): 75 (14 Feb 2021 03:41) (75 - 75)  RR: 20 (15 Feb 2021 00:53) (18 - 23)  SpO2: 95% (15 Feb 2021 00:53) (93% - 97%)    Physical Exam:   GENERAL: Elderly male lying supine in bed in no apparent distress.    HEENT: HEENT intact. Mucus membranes dry. +PERRLA. EOMI. Hearing aids in, hard of hearing. Per primary team baseline slow to respond. Trachea midline. No JVD.   LUNGS: Good bilateral chest rise. LS diminished bilaterally slight bilateral basilar crackles.   HEART: Irregular rate and rhythm. A-Fib on telemetry.   ABDOMEN: Distended, but soft, non-tender.   EXTREMITIES: Without any cyanosis, clubbing, rash, lesions or edema.  SKIN: No new rashes or lesions. Hot to touch.  MSK: +CMS X 4. No clonus.   VASCULAR: +1-+2 edema bilateral lower extremities.   NEUROLOGIC: Slow to respond, but per primary team baseline for patient.     02-13 @ 07:01 - 02-14 @ 07:00  --------------------------------------------------------  IN: 1000 mL / OUT: 0 mL / NET: 1000 mL    02-14 @ 07:01  -  02-15 @ 00:58  --------------------------------------------------------  IN: 580 mL / OUT: 1400 mL / NET: -820 mL                        14.6   4.80  )-----------( 140      ( 15 Feb 2021 00:21 )             43.9     02-14    133<L>  |  102  |  63<H>  ----------------------------<  107<H>  4.3   |  16<L>  |  3.14<H>    Ca    9.0      15 Feb 2021 00:21  Phos  3.0     02-15  Mg     2.2     02-15    TPro  6.0  /  Alb  2.7<L>  /  TBili  0.5  /  DBili  x   /  AST  71<H>  /  ALT  38  /  AlkPhos  108  02-14    ABG - ( 15 Feb 2021 00:15 )  pH, Arterial: 7.51  pH, Blood: x     /  pCO2: 16    /  pO2: 63    / HCO3: 13    / Base Excess: -6.9  /  SaO2: 93             LIVER FUNCTIONS - ( 14 Feb 2021 08:51 )  Alb: 2.7 g/dL / Pro: 6.0 g/dL / ALK PHOS: 108 U/L / ALT: 38 U/L / AST: 71 U/L / GGT: x              PT/INR - ( 15 Feb 2021 00:21 )   PT: 12.1 sec;   INR: 1.01          PTT - ( 15 Feb 2021 00:21 )  PTT:34.4 sec    MEDICATIONS  (STANDING):  acetaminophen  IVPB .. 1000 milliGRAM(s) IV Intermittent once  folic acid 1 milliGRAM(s) Oral daily  multivitamin 1 Tablet(s) Oral daily  pantoprazole    Tablet 40 milliGRAM(s) Oral before breakfast  piperacillin/tazobactam IVPB. 3.375 Gram(s) IV Intermittent once  piperacillin/tazobactam IVPB.. 3.375 Gram(s) IV Intermittent every 12 hours  sodium chloride 0.9%. 1000 milliLiter(s) (1000 mL/Hr) IV Continuous <Continuous>  thiamine 100 milliGRAM(s) Oral daily    MEDICATIONS  (PRN):  acetaminophen   Tablet .. 650 milliGRAM(s) Oral every 4 hours PRN Temp greater or equal to 38C (100.4F), Mild Pain (1 - 3)  LORazepam   Injectable 1 milliGRAM(s) IV Push every 1 hour PRN LISETHWA-Ar score 8 or greater    Assessment- Rapid Response called for 85y year old Male with a past medical history of bilateral PE (on xarelto), mitral valve prolapse, and CKD (reports baseline GFR 30) who presents to [facility] ED c/o 5d of NBNB diarrhea, dehydration, weakness, myalgias, dry cough, and worsening of his baseline tremor now status post RRT for elevation of MEWS score for fever of 106 rectally.    -Blood Cultures X 2.   -Patient hyperthermic but awake, alert and oriented to baseline. Protecting airway. States feeling better.   -96% on 2 LPM of NC  -Heparin gtt.  -Elevated Procalcitonin.  -Added on Zosyn.  -Status post APAP at 2300. Given additional 650 mg of APAP LA during RRT.   -Active cooling, including ice bath, ice packs to groin and central pulses.   -Place patient on cooling blanket.   -STAT EKG.  -Lactate 1.7  -STAT CXR.  -Follow up CK, CMP.   -Telemetry monitoring.   -Strict I and O's.   -VS per Telemetry Protocol.   -Maintain 2 large bore IV Accesses.   -Patient to be transferred to [facility] Stepdown Telemetry.  -Rest of plan per primary COVID Stepdown team.     Above discussed with primary team, Intensivist on call, and PCCM Fellow.

## 2021-02-15 NOTE — PROCEDURE NOTE - NSPOSTCAREGUIDE_GEN_A_CORE
Verbal/written post procedure instructions were given to patient/caregiver/Instructed patient/caregiver to follow-up with primary care physician/Instructed patient/caregiver regarding signs and symptoms of infection/Keep the cast/splint/dressing clean and dry/Care for catheter as per unit/ICU protocols

## 2021-02-15 NOTE — PROGRESS NOTE ADULT - PROBLEM SELECTOR PLAN 1
Patient with worsening sepsis. Febrile to 106F overnight, and tachycardic. Transferred to 2wo for further monitoring. Likely 2/2 COVID-19 infection.  - c/w COVID management (as below)  - given NBNB diarrhea on presentation to hospital, will get CTAP to r/o other etiologies once patient's fevers are stable  - EKG: sinus tachycardia, incomplete RBBB (QRS 110ms), Left anterior fascicle block, with frequent APCs.

## 2021-02-15 NOTE — PROGRESS NOTE ADULT - PROBLEM SELECTOR PLAN 1
Patient with worsening sepsis. Febrile to 106F overnight, and tachycardic. Transferred to 2wo for further monitoring. Likely 2/2 COVID-19 infection.  - c/w COVID management (as below)  - given NBNB diarrhea on presentation to hospital, will get CTAP to r/o other etiologies once patient's fevers are stable  #Afib with RVR  Patient found to have afib with RVR to the 150s on 2/15, likely in the setting of high fevers (up to 106F). S/p IV lopressor 2.5mg x2 without resolution. Started on standing PO lopressor 25mg q12h  - started on hep gtt for afib  - f/u AM PTT Patient with worsening sepsis. Febrile to 106F overnight, and tachycardic. Transferred to 2wo for further monitoring. Likely 2/2 COVID-19 infection and superimposed bacterial PNA.  - c/w COVID-19 management (as below)  - given NBNB diarrhea on presentation to hospital, will get CTAP to r/o other etiologies once patient's fevers are stable    #Afib with RVR  Patient found to have afib with RVR on tele monitor to the 150s on 2/15, likely in the setting of high fevers (up to 106F). S/p IV lopressor 2.5mg x2 without resolution. Started on standing PO lopressor 25mg q12h  - started on hep gtt for afib  - f/u AM PTT  - given persistent afib with RVR with HR 130s-140s, given IV 150mg amiodarone bolus x1 and started on amio gtt

## 2021-02-15 NOTE — PROGRESS NOTE ADULT - PROBLEM SELECTOR PLAN 7
Hx of MVP. Pt does not have a cardiologist.  -Nothing to do    #Hyponatremia  Na 133 on admission, worsened to 128 after 1L NS. possibly 2/2 renal failure vs SIADH from covid-19  -F/u urine osm and urine lytes to assess for SIADH  -f/u AM Na Hx of MVP. Pt does not have a cardiologist.  -no acute interventions    #Hyponatremia  Na 133 on admission, worsened to 128 after 1L NS. possibly 2/2 renal failure vs SIADH from covid-19  -F/u urine osm and urine lytes to assess for SIADH  -f/u AM Na

## 2021-02-15 NOTE — PROCEDURE NOTE - NSICDXPROCEDURE_GEN_ALL_CORE_FT
PROCEDURES:  Insertion, catheter, intravenous 15-Feb-2021 01:37:56  Markos Silverio  Puncture for arterial blood gas measurement 15-Feb-2021 01:36:06  Markos Silverio  
PROCEDURES:  Percutaneous insertion of arterial line 15-Feb-2021 07:57:27  Markos Silverio  Insertion, catheter, intravenous 15-Feb-2021 01:37:56  Markos Silverio  Puncture for arterial blood gas measurement 15-Feb-2021 01:36:06  Markos Silverio  
PROCEDURES:  Puncture for arterial blood gas measurement 15-Feb-2021 01:36:06  Markos Silverio

## 2021-02-15 NOTE — PROGRESS NOTE ADULT - SUBJECTIVE AND OBJECTIVE BOX
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 Found to be COVID-19+. Patient was admitted for poor PO intake, BERTRAND on CKD, and was saturating well on RA. On the evening of 2/14, rapid response called for fever to 106F, hypoxia requiring 2L NC and tachypnea. Patient hyperthermic but awake, alert and oriented to baseline. Protecting airway. States feeling better. Ordered STAT CBC, CMP, CK, lactate, procal, ABG, BCx, EKG, CXR. Given additional 650mg Tylenol suppository. Active cooling, including ice bath, ice packs to groin and central pulses. Started Zosyn for broad coverage. Patient transferred to 2WO for worsening sepsis.     Family History, Social History, Past Medical History, and Home Medications from admission H&P were reviewed and are unchanged unless noted below.       VITALS  Vital Signs Last 24 Hrs  T(C): 39 (15 Feb 2021 01:48), Max: 40.9 (14 Feb 2021 23:42)  T(F): 102.2 (15 Feb 2021 01:48), Max: 105.6 (14 Feb 2021 23:42)  HR: 89 (15 Feb 2021 01:38) (79 - 115)  BP: 113/57 (15 Feb 2021 01:16) (92/61 - 120/69)  BP(mean): 69 (15 Feb 2021 01:14) (69 - 75)  RR: 18 (15 Feb 2021 01:16) (18 - 23)  SpO2: 97% (15 Feb 2021 01:16) (93% - 97%)    CAPILLARY BLOOD GLUCOSE      POCT Blood Glucose.: 117 mg/dL (14 Feb 2021 23:55)      PHYSICAL EXAM  GENERAL: In mild distress, with increased WOB, warm to the touch  HEENT: EOMI. Neck supple. No JVD.   CV: RRR. +S1/S2. No murmurs  LUNGS: Clear to auscultation b/l  ABDOMEN: Soft, nontender, nondistended. +BS  EXT: WWP. No edema in b/l extremities    MEDICATIONS  (STANDING):  dexAMETHasone  Injectable 6 milliGRAM(s) IV Push every 24 hours  folic acid 1 milliGRAM(s) Oral daily  heparin  Infusion 1500 Unit(s)/Hr (15 mL/Hr) IV Continuous <Continuous>  metoprolol tartrate 25 milliGRAM(s) Oral every 12 hours  multivitamin 1 Tablet(s) Oral daily  pantoprazole    Tablet 40 milliGRAM(s) Oral before breakfast  piperacillin/tazobactam IVPB.. 3.375 Gram(s) IV Intermittent every 12 hours  thiamine 100 milliGRAM(s) Oral daily    MEDICATIONS  (PRN):  acetaminophen   Tablet .. 650 milliGRAM(s) Oral every 4 hours PRN Temp greater or equal to 38C (100.4F), Mild Pain (1 - 3)  LORazepam   Injectable 1 milliGRAM(s) IV Push every 1 hour PRN CIWA-Ar score 8 or greater      No Known Allergies      LABS                        14.6   4.80  )-----------( 140      ( 15 Feb 2021 00:21 )             43.9     02-15    133<L>  |  97  |  64<H>  ----------------------------<  106<H>  4.4   |  18<L>  |  3.31<H>    Ca    9.0      15 Feb 2021 00:21  Phos  3.0     02-15  Mg     2.2     02-15    TPro  7.5  /  Alb  3.3  /  TBili  0.8  /  DBili  x   /  AST  87<H>  /  ALT  44  /  AlkPhos  223<H>  02-15    PT/INR - ( 15 Feb 2021 00:21 )   PT: 12.1 sec;   INR: 1.01          PTT - ( 15 Feb 2021 00:21 )  PTT:34.4 sec    CARDIAC MARKERS ( 15 Feb 2021 00:21 )  x     / 0.05 ng/mL / 1754 U/L / x     / 2.1 ng/mL  CARDIAC MARKERS ( 14 Feb 2021 17:18 )  x     / 0.03 ng/mL / 1585 U/L / x     / x      CARDIAC MARKERS ( 14 Feb 2021 08:51 )  x     / 0.04 ng/mL / 1636 U/L / x     / 3.4 ng/mL  CARDIAC MARKERS ( 13 Feb 2021 22:48 )  x     / 0.03 ng/mL / 1655 U/L / x     / 2.8 ng/mL  CARDIAC MARKERS ( 13 Feb 2021 16:57 )  x     / 0.02 ng/mL / x     / x     / x            IMAGING/EKG/ETC  EKG:  Xray:  CT:  MRI: TRANSFER NOTE  4UR COVID RMF TO [address] [place] [address] Course:  85M w/ PMH of 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 ED c/o 5d of NBNB diarrhea, dehydration, weakness, myalgias, dry cough, and worsening of his baseline tremor, and intermittent fevers (Tmax 102F at home). Found to be COVID-19+. Patient was admitted for poor PO intake, BERTRAND on CKD, and was saturating well on RA. On the evening of 2/14, rapid response called for fever to 106F, hypoxia requiring 2L NC and tachypnea. Patient hyperthermic but awake, alert and oriented to baseline. Protecting airway. States feeling better. Ordered STAT CBC, CMP, CK, lactate, procal, ABG, BCx, EKG, CXR. Given additional 650mg Tylenol suppository. Active cooling, including ice bath, ice packs to groin and central pulses. Started Zosyn for broad coverage. Patient transferred to 2WO for worsening sepsis.     Family History, Social History, Past Medical History, and Home Medications from admission H&P were reviewed and are unchanged unless noted below.       VITALS  Vital Signs Last 24 Hrs  T(C): 39 (15 Feb 2021 01:48), Max: 40.9 (14 Feb 2021 23:42)  T(F): 102.2 (15 Feb 2021 01:48), Max: 105.6 (14 Feb 2021 23:42)  HR: 89 (15 Feb 2021 01:38) (79 - 115)  BP: 113/57 (15 Feb 2021 01:16) (92/61 - 120/69)  BP(mean): 69 (15 Feb 2021 01:14) (69 - 75)  RR: 18 (15 Feb 2021 01:16) (18 - 23)  SpO2: 97% (15 Feb 2021 01:16) (93% - 97%)    CAPILLARY BLOOD GLUCOSE      POCT Blood Glucose.: 117 mg/dL (14 Feb 2021 23:55)      PHYSICAL EXAM  GENERAL: In mild distress, with increased WOB, warm to the touch  HEENT: EOMI. Neck supple. No JVD.   CV: RRR. +S1/S2. No murmurs  LUNGS: Clear to auscultation b/l  ABDOMEN: Soft, nontender, nondistended. +BS  EXT: WWP. No edema in b/l extremities    MEDICATIONS  (STANDING):  dexAMETHasone  Injectable 6 milliGRAM(s) IV Push every 24 hours  folic acid 1 milliGRAM(s) Oral daily  heparin  Infusion 1500 Unit(s)/Hr (15 mL/Hr) IV Continuous <Continuous>  metoprolol tartrate 25 milliGRAM(s) Oral every 12 hours  multivitamin 1 Tablet(s) Oral daily  pantoprazole    Tablet 40 milliGRAM(s) Oral before breakfast  piperacillin/tazobactam IVPB.. 3.375 Gram(s) IV Intermittent every 12 hours  thiamine 100 milliGRAM(s) Oral daily    MEDICATIONS  (PRN):  acetaminophen   Tablet .. 650 milliGRAM(s) Oral every 4 hours PRN Temp greater or equal to 38C (100.4F), Mild Pain (1 - 3)  LORazepam   Injectable 1 milliGRAM(s) IV Push every 1 hour PRN CIWA-Ar score 8 or greater      No Known Allergies      LABS                        14.6   4.80  )-----------( 140      ( 15 Feb 2021 00:21 )             43.9     02-15    133<L>  |  97  |  64<H>  ----------------------------<  106<H>  4.4   |  18<L>  |  3.31<H>    Ca    9.0      15 Feb 2021 00:21  Phos  3.0     02-15  Mg     2.2     02-15    TPro  7.5  /  Alb  3.3  /  TBili  0.8  /  DBili  x   /  AST  87<H>  /  ALT  44  /  AlkPhos  223<H>  02-15    PT/INR - ( 15 Feb 2021 00:21 )   PT: 12.1 sec;   INR: 1.01          PTT - ( 15 Feb 2021 00:21 )  PTT:34.4 sec    CARDIAC MARKERS ( 15 Feb 2021 00:21 )  x     / 0.05 ng/mL / 1754 U/L / x     / 2.1 ng/mL  CARDIAC MARKERS ( 14 Feb 2021 17:18 )  x     / 0.03 ng/mL / 1585 U/L / x     / x      CARDIAC MARKERS ( 14 Feb 2021 08:51 )  x     / 0.04 ng/mL / 1636 U/L / x     / 3.4 ng/mL  CARDIAC MARKERS ( 13 Feb 2021 22:48 )  x     / 0.03 ng/mL / 1655 U/L / x     / 2.8 ng/mL  CARDIAC MARKERS ( 13 Feb 2021 16:57 )  x     / 0.02 ng/mL / x     / x     / x            IMAGING/EKG/ETC  EKG:  Xray:  CT:  MRI:

## 2021-02-15 NOTE — PROGRESS NOTE ADULT - PROBLEM SELECTOR PLAN 4
Febrile to 102 in ED, w/ significantly elevated procal 67.29.  -CXR w/o focal consolidation, though crackles bilaterally  -S/p ceftriaxone 1g, azithromycin 500mg in ED  Plan:  -c/w ceftriaxone and azithromycin for CAP coverage     -f/u urine legionella and urine strep to eval for atypical pna    -f/u sputum clx    -f/u blood cultures obtained in ED Febrile to 102 in ED, w/ significantly elevated procal 67.29.  -CXR w/o focal consolidation, though crackles bilaterally  -S/p ceftriaxone 1g, azithromycin 500mg in ED, and initially continued on CTX and azithro for CAP coverage  - broadened to zosyn overnight given fevers of 106F    -f/u urine legionella and urine strep to eval for atypical pna    -f/u sputum cx    -f/u blood cultures obtained in ED

## 2021-02-15 NOTE — PROGRESS NOTE ADULT - ASSESSMENT
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 Found to be COVID-19+, BERTRAND on CKD. Transferred to 2WO due to worsening sepsis.

## 2021-02-15 NOTE — PROGRESS NOTE ADULT - PROBLEM SELECTOR PLAN 2
Pt reports hx of renal failure with baseline GFR ~30. Follows with Dr. Jamil Tee (nephrology) and Dr. Ishaan Gaming (urology). On admission, found to have Cr 2.93 in setting of decreased PO intake, fevers, covid-19. Cr function did not improve with fluid boluses. CK 1585. likely intrinsic process possibly 2/2 COVID vs rhabdomyolysis. Bladder scan showed retention of 500 cc, so condon placed   -condon in place - strict I/O  -f/u renal US to rule out pyelo  -F/u urine lytes  -Monitor Cr with daily BMP, monitor urine output  -Avoid nephrotoxic medications Hx of renal failure with reported baseline GFR ~30. Follows with Dr. Jamil Tee (nephrology) and Dr. Ishaan Gaming (urology). On admission, found to have Cr 2.93 in setting of decreased PO intake, fevers, COVID-19. Cr function did not improve with fluid boluses. CK 1585. likely intrinsic process possibly 2/2 COVID vs rhabdomyolysis. Bladder scan showed retention of 500 cc, so condon placed   -condon in place - strict I/O  -f/u renal US to rule out pyelo  -F/u urine lytes  -Monitor Cr with daily BMP, monitor urine output  -Avoid nephrotoxic medications

## 2021-02-15 NOTE — PROGRESS NOTE ADULT - ASSESSMENT
85M w/ PMH of b/l PE (on xarelto), mitral valve prolapse, and CKD (reports baseline GFR 30) who presents to [facility] ED c/o 5d of NBNB diarrhea, dehydration, weakness, myalgias, dry cough, and worsening of his baseline tremor, and intermittent fevers (Tmax 102F at home). Found to be COVID-19+, BERTRAND on CKD. Now transferred to 2WO due to worsening sepsis. 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 Found to be COVID-19+, BERTRAND on CKD. Now transferred to 2WO due to worsening sepsis. 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 Found to be COVID-19+, BERTRAND on CKD. Now transferred to 2WO due to worsening sepsis.    ADDENDUM: In the setting of new hypotension with sBP 70s on the morning of 2/15, not responding to 1L LR bolus, patient stepped up to 3WO for pressor support and closer monitoring. Bedside ICU fellow echo concerning for RV dilation and bowing of RV into septum.

## 2021-02-15 NOTE — CHART NOTE - NSCHARTNOTEFT_GEN_A_CORE
called to review ECG for Mr. Brody,     The ECG dated 2/15/21 at 01:07:35 am shows:    sinus tachycardia, incomplete RBBB (QRS 110ms), Left anterior fascicle block, with frequent APCs.     The ECG was reviewed with primary team

## 2021-02-15 NOTE — PROGRESS NOTE ADULT - PROBLEM SELECTOR PLAN 10
F: encourage PO  E: replete to K>4, Mg>2, Phos>2.5  N: regular diet  Ppx: Heparin    Code Status: full code    Dispo: covid-RMF

## 2021-02-15 NOTE — PROGRESS NOTE ADULT - SUBJECTIVE AND OBJECTIVE BOX
****************** TRANSFER ACCEPTANCE NOTE 2WO TO 3WO  ******************    HOSPITAL COURSE:    85M PMH of b/l PE (provoked, then unprovoked 2016, on xarelto), mitral valve prolapse, and CKD (reports baseline GFR 30) who presented to [facility] ED 2/13 c/o 5d of NBNB diarrhea, dehydration, weakness, myalgias, dry cough, and worsening of his baseline tremor, and intermittent fevers (Tmax 102F at home). Found to be COVID-19+. He was admitted for poor PO intake, BERTRAND on CKD, and was saturating well on RA. On the evening of 2/14, rapid response called for fever to 106F, hypoxia requiring 2L NC and tachypnea. Patient hyperthermic but awake, alert and oriented to baseline, and was protecting his airway. Was given additional 650mg Tylenol suppository, active cooling, including ice bath, ice packs to groin and central pulses, and started on Zosyn for broad coverage. He was then transferred to 2WO for worsening sepsis, now stepped up to 3WO 2/15 for AFib RVR and worsening sepsis    INTERVAL HPI/OVERNIGHT EVENTS:  O/N: Rapid called for T 106F, Transferred to 2Wo for worsening sepsis; stepped up to 3WO in AM  This morning: Patient was seen and examined at bedside. Endorsing leg pain on palpation.    VITAL SIGNS:  T(F): 97 (02-15-21 @ 14:00)  HR: 54 (02-15-21 @ 14:45)  BP: 99/71 (02-15-21 @ 09:00)  RR: 30 (02-15-21 @ 14:45)  SpO2: 97% (02-15-21 @ 14:45)  Wt(kg): --    PHYSICAL EXAM:      GENERAL: Sitting in bed, wearing glasses, comfortable appearing  HEENT: dilated pupils, wearing hearing aides  CV: distant heart sounds  LUNGS: CTAB, breathing comfortably satting 90s on 3L NC  ABDOMEN: soft, nontender, nondistended  EXT: cool extremities, no edema appreciated, legs tender to palpation  Neuro: A&O x3  : Cardenas draining clear yellow urine    MEDICATIONS  (STANDING):  aMIOdarone Infusion 1 mG/Min (33.3 mL/Hr) IV Continuous <Continuous>  aMIOdarone Infusion 0.5 mG/Min (16.7 mL/Hr) IV Continuous <Continuous>  chlorhexidine 2% Cloths 1 Application(s) Topical <User Schedule>  dexAMETHasone  Injectable 6 milliGRAM(s) IV Push every 24 hours  folic acid 1 milliGRAM(s) Oral daily  heparin  Infusion 1500 Unit(s)/Hr (16 mL/Hr) IV Continuous <Continuous>  lactated ringers Bolus 1000 milliLiter(s) IV Bolus once  metoprolol tartrate 25 milliGRAM(s) Oral every 12 hours  multivitamin 1 Tablet(s) Oral daily  pantoprazole    Tablet 40 milliGRAM(s) Oral before breakfast  phenylephrine    Infusion 0.1 MICROgram(s)/kG/Min (3.06 mL/Hr) IV Continuous <Continuous>  piperacillin/tazobactam IVPB.. 4.5 Gram(s) IV Intermittent every 12 hours  thiamine 100 milliGRAM(s) Oral daily    MEDICATIONS  (PRN):  acetaminophen   Tablet .. 650 milliGRAM(s) Oral every 4 hours PRN Temp greater or equal to 38C (100.4F), Mild Pain (1 - 3)  LORazepam   Injectable 1 milliGRAM(s) IV Push every 1 hour PRN CIWA-Ar score 8 or greater      Allergies    No Known Allergies    Intolerances        LABS:                        13.4   6.88  )-----------( 120      ( 15 Feb 2021 07:24 )             41.4     02-15    133<L>  |  103  |  64<H>  ----------------------------<  127<H>  4.4   |  15<L>  |  3.14<H>    Ca    8.0<L>      15 Feb 2021 07:24  Phos  4.6     02-15  Mg     2.3     02-15    TPro  6.2  /  Alb  2.4<L>  /  TBili  0.6  /  DBili  x   /  AST  70<H>  /  ALT  33  /  AlkPhos  187<H>  02-15    PT/INR - ( 15 Feb 2021 00:21 )   PT: 12.1 sec;   INR: 1.01          PTT - ( 15 Feb 2021 07:24 )  PTT:45.0 sec  Urinalysis Basic - ( 15 Feb 2021 12:10 )    Color: Yellow / Appearance: Clear / SG: >=1.030 / pH: x  Gluc: x / Ketone: NEGATIVE  / Bili: Negative / Urobili: 0.2 E.U./dL   Blood: x / Protein: 30 mg/dL / Nitrite: NEGATIVE   Leuk Esterase: NEGATIVE / RBC: > 10 /HPF / WBC < 5 /HPF   Sq Epi: x / Non Sq Epi: 0-5 /HPF / Bacteria: Present /HPF            Culture - Blood (collected 02-15-21 @ 01:08)  Source: .Blood Blood  Preliminary Report (02-15-21 @ 14:00):    No growth at 12 hours    Culture - Blood (collected 02-15-21 @ 01:08)  Source: .Blood Blood  Preliminary Report (02-15-21 @ 14:00):    No growth at 12 hours        RADIOLOGY & ADDITIONAL TESTS:  Reviewed ****************** TRANSFER ACCEPTANCE NOTE 2WO TO 3WO  ******************    HOSPITAL COURSE:    85M PMH of b/l PE (provoked, then unprovoked 2016, on xarelto), mitral valve prolapse, and CKD (reports baseline GFR 30) who presented to [facility] ED 2/13 c/o 5d of NBNB diarrhea, dehydration, weakness, myalgias, dry cough, and worsening of his baseline tremor, and intermittent fevers (Tmax 102F at home). Found to be COVID-19+. He was admitted for poor PO intake, BERTRAND on CKD, and was saturating well on RA. On the evening of 2/14, rapid response called for fever to 106F, hypoxia requiring 2L NC and tachypnea. Patient hyperthermic but awake, alert and oriented to baseline, and was protecting his airway. Was given additional 650mg Tylenol suppository, active cooling, including ice bath, ice packs to groin and central pulses, and started on Zosyn for broad coverage. He was then transferred to 2WO for worsening sepsis, now stepped up to 3WO 2/15 for AFib RVR and worsening sepsis    INTERVAL HPI/OVERNIGHT EVENTS:  O/N: Rapid called for T 106F, Transferred to 2Wo for worsening sepsis; stepped up to 3WO in AM  This morning: Patient was seen and examined at bedside. Endorsing leg pain on palpation.    VITAL SIGNS:  T(F): 97 (02-15-21 @ 14:00)  HR: 54 (02-15-21 @ 14:45)  BP: 99/71 (02-15-21 @ 09:00)  RR: 30 (02-15-21 @ 14:45)  SpO2: 97% (02-15-21 @ 14:45)  Wt(kg): --    PHYSICAL EXAM:      GENERAL: Sitting in bed, wearing glasses, comfortable appearing  HEENT: dilated pupils, wearing hearing aids  CV: distant heart sounds  LUNGS: CTAB, breathing comfortably satting 90s on 3L NC  ABDOMEN: soft, nontender, nondistended  EXT: cool extremities, no edema appreciated, legs tender to palpation  VASC: pulses diminished  Neuro: A&O x3  : Cardenas draining clear yellow urine    MEDICATIONS  (STANDING):  aMIOdarone Infusion 1 mG/Min (33.3 mL/Hr) IV Continuous <Continuous>  aMIOdarone Infusion 0.5 mG/Min (16.7 mL/Hr) IV Continuous <Continuous>  chlorhexidine 2% Cloths 1 Application(s) Topical <User Schedule>  dexAMETHasone  Injectable 6 milliGRAM(s) IV Push every 24 hours  folic acid 1 milliGRAM(s) Oral daily  heparin  Infusion 1500 Unit(s)/Hr (16 mL/Hr) IV Continuous <Continuous>  lactated ringers Bolus 1000 milliLiter(s) IV Bolus once  metoprolol tartrate 25 milliGRAM(s) Oral every 12 hours  multivitamin 1 Tablet(s) Oral daily  pantoprazole    Tablet 40 milliGRAM(s) Oral before breakfast  phenylephrine    Infusion 0.1 MICROgram(s)/kG/Min (3.06 mL/Hr) IV Continuous <Continuous>  piperacillin/tazobactam IVPB.. 4.5 Gram(s) IV Intermittent every 12 hours  thiamine 100 milliGRAM(s) Oral daily    MEDICATIONS  (PRN):  acetaminophen   Tablet .. 650 milliGRAM(s) Oral every 4 hours PRN Temp greater or equal to 38C (100.4F), Mild Pain (1 - 3)  LORazepam   Injectable 1 milliGRAM(s) IV Push every 1 hour PRN CIWA-Ar score 8 or greater      Allergies    No Known Allergies    Intolerances        LABS:                        13.4   6.88  )-----------( 120      ( 15 Feb 2021 07:24 )             41.4     02-15    133<L>  |  103  |  64<H>  ----------------------------<  127<H>  4.4   |  15<L>  |  3.14<H>    Ca    8.0<L>      15 Feb 2021 07:24  Phos  4.6     02-15  Mg     2.3     02-15    TPro  6.2  /  Alb  2.4<L>  /  TBili  0.6  /  DBili  x   /  AST  70<H>  /  ALT  33  /  AlkPhos  187<H>  02-15    PT/INR - ( 15 Feb 2021 00:21 )   PT: 12.1 sec;   INR: 1.01          PTT - ( 15 Feb 2021 07:24 )  PTT:45.0 sec  Urinalysis Basic - ( 15 Feb 2021 12:10 )    Color: Yellow / Appearance: Clear / SG: >=1.030 / pH: x  Gluc: x / Ketone: NEGATIVE  / Bili: Negative / Urobili: 0.2 E.U./dL   Blood: x / Protein: 30 mg/dL / Nitrite: NEGATIVE   Leuk Esterase: NEGATIVE / RBC: > 10 /HPF / WBC < 5 /HPF   Sq Epi: x / Non Sq Epi: 0-5 /HPF / Bacteria: Present /HPF            Culture - Blood (collected 02-15-21 @ 01:08)  Source: .Blood Blood  Preliminary Report (02-15-21 @ 14:00):    No growth at 12 hours    Culture - Blood (collected 02-15-21 @ 01:08)  Source: .Blood Blood  Preliminary Report (02-15-21 @ 14:00):    No growth at 12 hours        RADIOLOGY & ADDITIONAL TESTS:  Reviewed

## 2021-02-15 NOTE — PROGRESS NOTE ADULT - ATTENDING COMMENTS
85 year old male admitted w systemic  COVID symptoms, fatigue malaise, fever, to temp 106.  Given tylenol suppository.  Complicated PMH including afib/PEs, CKD  Plan on admit to COVID unit fortemp control, rx of covid and sepsis.  Cultures drawn, consider broadening

## 2021-02-15 NOTE — PROGRESS NOTE ADULT - PROBLEM SELECTOR PLAN 9
Home medication: losartan 50mg qd  -/64 on admission  -Currently holding in setting of BERTRAND and sepsis Hx of HTN on losartan 50mg qd at home  -/64 on admission, now in shock  -Currently holding anti-HTN in setting of hypotension

## 2021-02-15 NOTE — PROGRESS NOTE ADULT - ASSESSMENT
85M PMH b/l PE (on xarelto), mitral valve prolapse, and CKD (reports baseline GFR 30) who presented to [facility] ED c/o 5d of NBNB diarrhea, dehydration, weakness, myalgias, dry cough, and worsening of his baseline tremor, and intermittent fevers (Tmax 102F at home), found to be COVID-19+, BERTRAND on CKD. Transferred to 2WO due to worsening sepsis then transferred to 2WO for worsening sepsis, now stepped up to 3WO 2/15 for AFib RVR and worsening sepsis    NEURO:  #Essential tremor. Plan: Tremor for >10yrs, worse with intention. Self-medicates with vodka, he takes 2 shots of vodka every morning one martini at night with improvement of his symptoms. There has been no increase in the amount of vodka he drinks during acute worsening of tremor. Worsening likely 2/2 acute infection and dehydration.   -Pt independent in ADLs at baseline, however daughter requesting assessment for home health aid after discharge.  -F/u social work consult.    #Alcohol use  Drinks as above. Recent CIWAs 0.  -CIWA protocol, monitor for signs of alcohol withdrawal      PULM:    #Pneumonia due to COVID-19 virus.    COVID PCR positive (2/13/21)  -CXR w/ diffuse infiltrates b/l  -O2 requirements: 95% on 2L nc  -Covid labs: CRP 33.21, ferritin 1853  - not eligible for remdesivir based on O2sat and BERTRAND. Procalcitonin significantly elevated, procalcitonin 67.29  - new overnight high fevers up to 106F s/p acetaminophen 650mg PO x1 and rectal x1  - started 2/15 on IV decadron 6mg qd x 10 day given new O2 requirement.     #Bacterial pneumonia.   Febrile to 102 in ED, w/ significantly elevated procal 67.29.  CXR w/o focal consolidation, though crackles bilaterally. S/p ceftriaxone 1g, azithromycin 500mg in ED  -started on ceftriaxone and azithromycin for CAP coverage  then switched to zosyn 2/15  -f/u urine legionella and urine strep to eval for atypical pna  -f/u sputum Cx  -f/u repeat BCx 2/15.   -f/u MRSA swab    #Chronic pulmonary embolism, unspecified pulmonary embolism type, unspecified whether acute cor pulmonale present.  Has hx of PE, per daughter ~5yrs ago. First time provoked after hernia surgery, was on xarelto for a few months, then unprovoked in 2016 off xarelto after 3 months, then resumed Xarelto. Currently on xarelto 10mg qd for indefinite treatment of PE. No hx of malignancy, immobilization, or other risk factors, likely unprovoked. xarelto contraindicated in BERTRAND.   - c/w hep gtt  - f/u AM PTTs  - f/u echo to r/o right heart strain  - duplex bl lower extrem u/s    CARDIOVASCULAR:    #AFib with RVR  No known hx AFib. Had 16sec of PAT with HR 150s around 2am 2/15. Also noted to have rapid afib in HR 150s. s/p IV lopressor 2.5 x2, however HR still in 150s. IV amio 150mg x1 bolus. Started on PO lopressor 25 BID. Pt persistently tachycardic 120s-130s. Found to be hypotensive to systolic 70s, MAPs high 50s. 1L LR bolus ordered. 1x IV 150mg amio bolus and amio gtt ordered, transfered to 3WO. TSH WNL.  - c/w amiodarone gtt until reach maintenance dosage        #Mitral valve prolapse.   Hx of MVP. Pt does not have a cardiologist.  -monitor BPs, outpt f/u    #Elevated trop 0.02  No chest pain, no ischemic changes on EKG. Peaked on 2/15 at .05  -Likely demand 2/2 covid-19, poor clearance from BERTRAND-on-CKD  -If c/o of chest pain, stat EKG and repeat cardiac enzymes    #Prolonged QTc  QTc 502 on admission, no hx of QTc-prolonging medications  -F/u AM EKG to monitor for improvement.     #Hypertension.    Home medication: losartan 50mg qd  -continue holding in setting of BERTRAND and sepsis.    GI:  No active issues.    RENAL:  #Acute on chronic kidney failure.   Pt reports hx of renal failure with baseline GFR ~30. Follows with Dr. Jamil Tee (nephrology) and Dr. Ishaan Gaming (urology). On admission, found to have Cr 2.93 in setting of decreased PO intake, fevers, covid-19. Cr function did not improve with fluid boluses. CK 1585. likely intrinsic process possibly 2/2 COVID vs rhabdomyolysis. Bladder scan showed retention of 500 cc, so condon placed   -condon in place - strict I/O  -F/u urine lytes   -Monitor Cr with daily BMP, CK, monitor urine output  -Avoid nephrotoxic medications.    #Hyponatremia  Na 133 on admission, worsened to 128 after 1L NS. possibly 2/2 renal failure vs SIADH from covid-19  -F/u urine osm and urine lytes to assess for SIADH  -f/u AM Na.       ENDO:   No active issues    ID:     #Sepsis.  Plan: Patient with worsening sepsis. Febrile to 106F overnight, and tachycardic. Transferred to 2WO then 3WO for further monitoring. Likely 2/2 COVID-19 infection.  - c/w COVID management (as below)  - EKG: sinus tachycardia, incomplete RBBB (QRS 110ms), Left anterior fascicle block, with frequent APCs.   - f/u rectal temp     HEME/ONC:  No active issues    PREVENTIVE:   F: encourage PO  E: replete to K>4, Mg>2, Phos>2.5  N: regular diet  Ppx: Heparin  Code Status: full code  Dispo: covid-RMF.           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 Transferred to 2WO due to worsening sepsis then transferred to 2WO for worsening sepsis, now stepped up to 3WO 2/15 for AFib RVR and worsening sepsis with worse renal function likley degree of QQTN    NEURO:  #Essential tremor. Plan: Tremor for >10yrs, worse with intention. Self-medicates with vodka, he takes 2 shots of vodka every morning one martini at night with improvement of his symptoms. There has been no increase in the amount of vodka he drinks during acute worsening of tremor. Worsening likely 2/2 acute infection and dehydration.   -Pt independent in ADLs at baseline, however daughter requesting assessment for home health aid after discharge.  -F/u social work consult.    #Alcohol use  Drinks as above. Recent CIWAs 0.  -CIWA protocol, monitor for signs of alcohol withdrawal      PULM:    #Pneumonia due to COVID-19 virus.    COVID PCR positive (2/13/21)  -CXR w/ diffuse infiltrates b/l  -O2 requirements: 95% on 2L nc  -Covid labs: CRP 33.21, ferritin 1853  - not eligible for remdesivir based on O2sat and BERTRAND. Procalcitonin significantly elevated, procalcitonin 67.29  - new overnight high fevers up to 106F s/p acetaminophen 650mg PO x1 and rectal x1  - started 2/15 on IV decadron 6mg qd x 10 day given new O2 requirement.     #Bacterial pneumonia.   Febrile to 102 in ED, w/ significantly elevated procal 67.29.  CXR w/o focal consolidation, though crackles bilaterally. S/p ceftriaxone 1g, azithromycin 500mg in ED  -started on ceftriaxone and azithromycin for CAP coverage  then switched to zosyn 2/15  -f/u urine legionella and urine strep to eval for atypical pna  -f/u sputum Cx  -f/u repeat BCx 2/15.   -f/u MRSA swab    #Chronic pulmonary embolism, unspecified pulmonary embolism type, unspecified whether acute cor pulmonale present.  Has hx of PE, per daughter ~5yrs ago. First time provoked after hernia surgery, was on xarelto for a few months, then unprovoked in 2016 off xarelto after 3 months, then resumed Xarelto. Currently on xarelto 10mg qd for indefinite treatment of PE. No hx of malignancy, immobilization, or other risk factors, likely unprovoked. xarelto contraindicated in BERTRAND.   - c/w hep gtt  - f/u AM PTTs  - f/u echo to r/o right heart strain  - duplex bl lower extrem u/s    CARDIOVASCULAR:    #AFib with RVR  No known hx AFib. Had 16sec of PAT with HR 150s around 2am 2/15. Also noted to have rapid afib in HR 150s. s/p IV lopressor 2.5 x2, however HR still in 150s. IV amio 150mg x1 bolus. Started on PO lopressor 25 BID. Pt persistently tachycardic 120s-130s. Found to be hypotensive to systolic 70s, MAPs high 50s. 1L LR bolus ordered. 1x IV 150mg amio bolus and amio gtt ordered, transfered to 3WO. TSH WNL.  - c/w amiodarone gtt until reach maintenance dosage        #Mitral valve prolapse.   Hx of MVP. Pt does not have a cardiologist.  -monitor BPs, outpt f/u    #Elevated trop 0.02  No chest pain, no ischemic changes on EKG. Peaked on 2/15 at .05  -Likely demand 2/2 covid-19, poor clearance from BERTRAND-on-CKD  -If c/o of chest pain, stat EKG and repeat cardiac enzymes    #Prolonged QTc  QTc 502 on admission, no hx of QTc-prolonging medications  -F/u AM EKG to monitor for improvement.     #Hypertension.    Home medication: losartan 50mg qd  -continue holding in setting of BERTRAND and sepsis.    GI:  No active issues.    RENAL:  #Acute on chronic kidney failure.   Pt reports hx of renal failure with baseline GFR ~30. Follows with Dr. Jamil Tee (nephrology) and Dr. Ishaan Gaming (urology). On admission, found to have Cr 2.93 in setting of decreased PO intake, fevers, covid-19. Cr function did not improve with fluid boluses. CK 1585. likely intrinsic process possibly 2/2 COVID vs rhabdomyolysis. Bladder scan showed retention of 500 cc, so condon placed   -condon in place - strict I/O  -F/u urine lytes   -Monitor Cr with daily BMP, CK, monitor urine output  -Avoid nephrotoxic medications.    #Hyponatremia  Na 133 on admission, worsened to 128 after 1L NS. possibly 2/2 renal failure vs SIADH from covid-19  -F/u urine osm and urine lytes to assess for SIADH  -f/u AM Na.       ENDO:   No active issues    ID:     #Sepsis.  Plan: Patient with worsening sepsis. Febrile to 106F overnight, and tachycardic. Transferred to 2WO then 3WO for further monitoring. Likely 2/2 COVID-19 infection.  - c/w COVID management (as below)  - EKG: sinus tachycardia, incomplete RBBB (QRS 110ms), Left anterior fascicle block, with frequent APCs.   - f/u rectal temp     HEME/ONC:  No active issues    PREVENTIVE:   F: encourage PO  E: replete to K>4, Mg>2, Phos>2.5  N: regular diet  Ppx: Heparin  Code Status: full code  Dispo: covid-RMF.

## 2021-02-15 NOTE — PROCEDURE NOTE - NSPROCDETAILS_GEN_ALL_CORE
location identified, draped/prepped, sterile technique used, needle inserted/introduced/positive blood return obtained via catheter/connected to a pressurized flush line/sutured in place/Seldinger technique/all materials/supplies accounted for at end of procedure
location identified, draped/prepped, sterile technique used/flushes easily/secured in place
location identified, draped/prepped, sterile technique used, needle inserted/introduced/positive blood return obtained via catheter/hemostasis with direct pressure, dressing applied/all materials/supplies accounted for at end of procedure

## 2021-02-15 NOTE — PROGRESS NOTE ADULT - PROBLEM SELECTOR PLAN 5
Pt with hx of PE, per daughter ~5yrs ago. On xarelto 10mg qd for indefinite treatment of PE. No hx of malignancy, immobilization, or other risk factors, likely unprovoked. xarelto contraindicated in BERTRAND.   - c/w hep gtt  - f/u AM PTT

## 2021-02-15 NOTE — PROGRESS NOTE ADULT - PROBLEM SELECTOR PLAN 8
BNP 3014 on admission, unknown baseline. No hx or signs of heart failure, no JVD or lower extremity edema. Etiology likely 2/2 renal failure and MVP.  -Nothing to do    #Elevated trop 0.02  No chest pain, no ischemic changes on EKG.  -Likely demand 2/2 covid-19, poor clearance from BERTRAND-on-CKD  -Trend to clear  -If c/o of chest pain, stat EKG and repeat cardiac enzymes    #Prolonged QTc  QTc 502 on admission, no hx of QTc-prolonging medications  -F/u AM EKG to monitor for improvement BNP 3014 on admission, unknown baseline. No hx or signs of heart failure, no JVD or lower extremity edema. Etiology likely 2/2 renal failure and MVP.  -no acute interventions      #Elevated trop 0.02  No chest pain, no ischemic changes on EKG.  -Likely demand 2/2 covid-19, poor clearance from BERTRAND-on-CKD  -Trend to clear  -If c/o of chest pain, stat EKG and repeat cardiac enzymes    #Prolonged QTc  QTc 502 on admission, no hx of QTc-prolonging medications  -F/u AM EKG to monitor for improvement

## 2021-02-15 NOTE — PROGRESS NOTE ADULT - SUBJECTIVE AND OBJECTIVE BOX
4UR COVID RMF TO 2WO COVID TELE TRANSFER ACCEPTANCE NOTE    HOSPITAL COURSE:   4UR COVID RMF TO 2WO COVID TELE TRANSFER ACCEPTANCE NOTE    HOSPITAL COURSE:      INTERVAL HPI/OVERNIGHT EVENTS: Still with increased WOB, with decreasing trend in his temp from 105->104->103F    VITAL SIGNS:  T(F): 103.2 (02-15-21 @ 01:16)  HR: 88 (02-15-21 @ 01:16)  BP: 113/57 (02-15-21 @ 01:16)  RR: 18 (02-15-21 @ 01:16)  SpO2: 97% (02-15-21 @ 01:16)  Wt(kg): --    PHYSICAL EXAM:  GENERAL: In mild distress, with increased WOB, still feels warm to touch  HEENT: EOMI. Neck supple. No JVD.   CV: RRR. +S1/S2. No murmurs  LUNGS: Clear to auscultation b/l.  ABDOMEN: Soft, nontender, nondistended. +BS  EXT: WWP. No edema in b/l extremities    MEDICATIONS  (STANDING):  acetaminophen  IVPB .. 1000 milliGRAM(s) IV Intermittent once  dexAMETHasone  Injectable 6 milliGRAM(s) IV Push every 24 hours  folic acid 1 milliGRAM(s) Oral daily  heparin  Infusion 1500 Unit(s)/Hr (15 mL/Hr) IV Continuous <Continuous>  multivitamin 1 Tablet(s) Oral daily  pantoprazole    Tablet 40 milliGRAM(s) Oral before breakfast  piperacillin/tazobactam IVPB.. 3.375 Gram(s) IV Intermittent every 12 hours  sodium chloride 0.9%. 1000 milliLiter(s) (1000 mL/Hr) IV Continuous <Continuous>  thiamine 100 milliGRAM(s) Oral daily    MEDICATIONS  (PRN):  acetaminophen   Tablet .. 650 milliGRAM(s) Oral every 4 hours PRN Temp greater or equal to 38C (100.4F), Mild Pain (1 - 3)  LORazepam   Injectable 1 milliGRAM(s) IV Push every 1 hour PRN CIWA-Ar score 8 or greater      Allergies    No Known Allergies    Intolerances        LABS:                        14.6   4.80  )-----------( 140      ( 15 Feb 2021 00:21 )             43.9     02-15    133<L>  |  97  |  64<H>  ----------------------------<  106<H>  4.4   |  18<L>  |  3.31<H>    Ca    9.0      15 Feb 2021 00:21  Phos  3.0     02-15  Mg     2.2     02-15    TPro  7.5  /  Alb  3.3  /  TBili  0.8  /  DBili  x   /  AST  87<H>  /  ALT  44  /  AlkPhos  223<H>  02-15    PT/INR - ( 15 Feb 2021 00:21 )   PT: 12.1 sec;   INR: 1.01          PTT - ( 15 Feb 2021 00:21 )  PTT:34.4 sec      RADIOLOGY & ADDITIONAL TESTS:     4UR COVID RMF TO 2WO COVID TELE TRANSFER ACCEPTANCE NOTE    HOSPITAL COURSE:  85M w/ PMH of b/l PE (on xarelto), mitral valve prolapse, and CKD (reports baseline GFR 30) who presents to [facility] ED c/o 5d of NBNB diarrhea, dehydration, weakness, myalgias, dry cough, and worsening of his baseline tremor, and intermittent fevers (Tmax 102F at home). He has also had intermittent fevers with Tmax 102 at home. He denies recent travel or sick contacts. ROS negative for chest pain or palpitations, shortness of breath, abd pain.    He has had a baseline tremor for over a decade, worse with intention. He has been to neurologists for evaluation, no underlying etiology identified. He self-medicates with vodka, he takes 2 shots of vodka every morning one martini at night with improvement of his symptoms. There has been no increase in the amount of vodka he drinks during acute worsening of tremor. At baseline pt is independent in ADLS, lives alone. Per daughter, very dehydrated. Noticed that his tremors were worse in the AM when waking up.       INTERVAL HPI/OVERNIGHT EVENTS: Still with increased WOB, with decreasing trend in his temp from 105->104->103F    VITAL SIGNS:  T(F): 103.2 (02-15-21 @ 01:16)  HR: 88 (02-15-21 @ 01:16)  BP: 113/57 (02-15-21 @ 01:16)  RR: 18 (02-15-21 @ 01:16)  SpO2: 97% (02-15-21 @ 01:16)  Wt(kg): --    PHYSICAL EXAM:  GENERAL: In mild distress, with increased WOB, still feels warm to touch  HEENT: EOMI. Neck supple. No JVD.   CV: RRR. +S1/S2. No murmurs  LUNGS: Clear to auscultation b/l.  ABDOMEN: Soft, nontender, nondistended. +BS  EXT: WWP. No edema in b/l extremities    MEDICATIONS  (STANDING):  acetaminophen  IVPB .. 1000 milliGRAM(s) IV Intermittent once  dexAMETHasone  Injectable 6 milliGRAM(s) IV Push every 24 hours  folic acid 1 milliGRAM(s) Oral daily  heparin  Infusion 1500 Unit(s)/Hr (15 mL/Hr) IV Continuous <Continuous>  multivitamin 1 Tablet(s) Oral daily  pantoprazole    Tablet 40 milliGRAM(s) Oral before breakfast  piperacillin/tazobactam IVPB.. 3.375 Gram(s) IV Intermittent every 12 hours  sodium chloride 0.9%. 1000 milliLiter(s) (1000 mL/Hr) IV Continuous <Continuous>  thiamine 100 milliGRAM(s) Oral daily    MEDICATIONS  (PRN):  acetaminophen   Tablet .. 650 milliGRAM(s) Oral every 4 hours PRN Temp greater or equal to 38C (100.4F), Mild Pain (1 - 3)  LORazepam   Injectable 1 milliGRAM(s) IV Push every 1 hour PRN CIWA-Ar score 8 or greater      Allergies    No Known Allergies    Intolerances        LABS:                        14.6   4.80  )-----------( 140      ( 15 Feb 2021 00:21 )             43.9     02-15    133<L>  |  97  |  64<H>  ----------------------------<  106<H>  4.4   |  18<L>  |  3.31<H>    Ca    9.0      15 Feb 2021 00:21  Phos  3.0     02-15  Mg     2.2     02-15    TPro  7.5  /  Alb  3.3  /  TBili  0.8  /  DBili  x   /  AST  87<H>  /  ALT  44  /  AlkPhos  223<H>  02-15    PT/INR - ( 15 Feb 2021 00:21 )   PT: 12.1 sec;   INR: 1.01          PTT - ( 15 Feb 2021 00:21 )  PTT:34.4 sec      RADIOLOGY & ADDITIONAL TESTS:     4UR COVID RMF TO 2WO COVID TELE TRANSFER ACCEPTANCE NOTE    HOSPITAL COURSE:  85M w/ PMH of b/l PE (on xarelto), mitral valve prolapse, and CKD (reports baseline GFR 30) who presents to [facility] ED c/o 5d of NBNB diarrhea, dehydration, weakness, myalgias, dry cough, and worsening of his baseline tremor, and intermittent fevers (Tmax 102F at home). Found to be COVID-19+. Patient was admitted for poor PO intake, BERTRAND on CKD, and was saturating well on RA. On the evening of 2/14, rapid response called for fever to 106F, hypoxia requiring 2L NC and tachypnea. Patient transferred to 2WO for worsening sepsis.     INTERVAL HPI/OVERNIGHT EVENTS: Still with increased WOB, with decreasing trend in his temp from 105->104->103F after receiving PO acetaminophen 650mg x1 and rectal acetaminophen 650mg x1    VITAL SIGNS:  T(F): 103.2 (02-15-21 @ 01:16)  HR: 88 (02-15-21 @ 01:16)  BP: 113/57 (02-15-21 @ 01:16)  RR: 18 (02-15-21 @ 01:16)  SpO2: 97% (02-15-21 @ 01:16)  Wt(kg): --    PHYSICAL EXAM:  GENERAL: In mild distress, with increased WOB, still feels warm to touch  HEENT: EOMI. Neck supple. No JVD.   CV: RRR. +S1/S2. No murmurs  LUNGS: Clear to auscultation b/l.  ABDOMEN: Soft, nontender, nondistended. +BS  EXT: WWP. No edema in b/l extremities    MEDICATIONS  (STANDING):  acetaminophen  IVPB .. 1000 milliGRAM(s) IV Intermittent once  dexAMETHasone  Injectable 6 milliGRAM(s) IV Push every 24 hours  folic acid 1 milliGRAM(s) Oral daily  heparin  Infusion 1500 Unit(s)/Hr (15 mL/Hr) IV Continuous <Continuous>  multivitamin 1 Tablet(s) Oral daily  pantoprazole    Tablet 40 milliGRAM(s) Oral before breakfast  piperacillin/tazobactam IVPB.. 3.375 Gram(s) IV Intermittent every 12 hours  sodium chloride 0.9%. 1000 milliLiter(s) (1000 mL/Hr) IV Continuous <Continuous>  thiamine 100 milliGRAM(s) Oral daily    MEDICATIONS  (PRN):  acetaminophen   Tablet .. 650 milliGRAM(s) Oral every 4 hours PRN Temp greater or equal to 38C (100.4F), Mild Pain (1 - 3)  LORazepam   Injectable 1 milliGRAM(s) IV Push every 1 hour PRN CIWA-Ar score 8 or greater      Allergies    No Known Allergies    Intolerances        LABS:                        14.6   4.80  )-----------( 140      ( 15 Feb 2021 00:21 )             43.9     02-15    133<L>  |  97  |  64<H>  ----------------------------<  106<H>  4.4   |  18<L>  |  3.31<H>    Ca    9.0      15 Feb 2021 00:21  Phos  3.0     02-15  Mg     2.2     02-15    TPro  7.5  /  Alb  3.3  /  TBili  0.8  /  DBili  x   /  AST  87<H>  /  ALT  44  /  AlkPhos  223<H>  02-15    PT/INR - ( 15 Feb 2021 00:21 )   PT: 12.1 sec;   INR: 1.01          PTT - ( 15 Feb 2021 00:21 )  PTT:34.4 sec      RADIOLOGY & ADDITIONAL TESTS:     [place] website --- [email or web address] Website --- [email or web address] 4UR COVID RMF TO 2WO COVID TELE TRANSFER ACCEPTANCE NOTE    HOSPITAL COURSE:  85M w/ PMH of b/l PE (on xarelto), mitral valve prolapse, and CKD (reports baseline GFR 30) who presents to [facility] ED c/o 5d of NBNB diarrhea, dehydration, weakness, myalgias, dry cough, and worsening of his baseline tremor, and intermittent fevers (Tmax 102F at home). Found to be COVID-19+. Patient was admitted for poor PO intake, BERTRAND on CKD, and was saturating well on RA. On the evening of 2/14, rapid response called for fever to 106F, hypoxia requiring 2L NC and tachypnea. Patient transferred to 2WO for worsening sepsis.     INTERVAL HPI/OVERNIGHT EVENTS: Still with increased WOB, with decreasing trend in his temp from 105->104->103F after receiving PO acetaminophen 650mg x1 and rectal acetaminophen 650mg x1    VITAL SIGNS:  T(F): 103.2 (02-15-21 @ 01:16)  HR: 88 (02-15-21 @ 01:16)  BP: 113/57 (02-15-21 @ 01:16)  RR: 18 (02-15-21 @ 01:16)  SpO2: 97% (02-15-21 @ 01:16)  Wt(kg): --    PHYSICAL EXAM:  GENERAL: In mild distress, with increased WOB, still feels warm to touch  HEENT: EOMI. Neck supple. No JVD.   CV: Irregularly irregular. +S1/S2. No murmurs  LUNGS: B/l crackles in lower bases.  ABDOMEN: Soft, nontender, nondistended. +BS  EXT: WWP. No edema in b/l extremities; b/l essential tremors    MEDICATIONS  (STANDING):  acetaminophen  IVPB .. 1000 milliGRAM(s) IV Intermittent once  dexAMETHasone  Injectable 6 milliGRAM(s) IV Push every 24 hours  folic acid 1 milliGRAM(s) Oral daily  heparin  Infusion 1500 Unit(s)/Hr (15 mL/Hr) IV Continuous <Continuous>  multivitamin 1 Tablet(s) Oral daily  pantoprazole    Tablet 40 milliGRAM(s) Oral before breakfast  piperacillin/tazobactam IVPB.. 3.375 Gram(s) IV Intermittent every 12 hours  sodium chloride 0.9%. 1000 milliLiter(s) (1000 mL/Hr) IV Continuous <Continuous>  thiamine 100 milliGRAM(s) Oral daily    MEDICATIONS  (PRN):  acetaminophen   Tablet .. 650 milliGRAM(s) Oral every 4 hours PRN Temp greater or equal to 38C (100.4F), Mild Pain (1 - 3)  LORazepam   Injectable 1 milliGRAM(s) IV Push every 1 hour PRN CIWA-Ar score 8 or greater      Allergies    No Known Allergies    Intolerances        LABS:                        14.6   4.80  )-----------( 140      ( 15 Feb 2021 00:21 )             43.9     02-15    133<L>  |  97  |  64<H>  ----------------------------<  106<H>  4.4   |  18<L>  |  3.31<H>    Ca    9.0      15 Feb 2021 00:21  Phos  3.0     02-15  Mg     2.2     02-15    TPro  7.5  /  Alb  3.3  /  TBili  0.8  /  DBili  x   /  AST  87<H>  /  ALT  44  /  AlkPhos  223<H>  02-15    PT/INR - ( 15 Feb 2021 00:21 )   PT: 12.1 sec;   INR: 1.01          PTT - ( 15 Feb 2021 00:21 )  PTT:34.4 sec      RADIOLOGY & ADDITIONAL TESTS:

## 2021-02-16 DIAGNOSIS — I48.91 UNSPECIFIED ATRIAL FIBRILLATION: ICD-10-CM

## 2021-02-16 LAB
ALBUMIN SERPL ELPH-MCNC: 2.5 G/DL — LOW (ref 3.3–5)
ALP SERPL-CCNC: 149 U/L — HIGH (ref 40–120)
ALT FLD-CCNC: 28 U/L — SIGNIFICANT CHANGE UP (ref 10–45)
ANION GAP SERPL CALC-SCNC: 17 MMOL/L — SIGNIFICANT CHANGE UP (ref 5–17)
APTT BLD: 29.1 SEC — SIGNIFICANT CHANGE UP (ref 27.5–35.5)
APTT BLD: 49.5 SEC — HIGH (ref 27.5–35.5)
APTT BLD: 63.5 SEC — HIGH (ref 27.5–35.5)
AST SERPL-CCNC: 50 U/L — HIGH (ref 10–40)
BASOPHILS # BLD AUTO: 0 K/UL — SIGNIFICANT CHANGE UP (ref 0–0.2)
BASOPHILS NFR BLD AUTO: 0 % — SIGNIFICANT CHANGE UP (ref 0–2)
BILIRUB SERPL-MCNC: 0.5 MG/DL — SIGNIFICANT CHANGE UP (ref 0.2–1.2)
BUN SERPL-MCNC: 61 MG/DL — HIGH (ref 7–23)
CALCIUM SERPL-MCNC: 7.3 MG/DL — LOW (ref 8.4–10.5)
CHLORIDE SERPL-SCNC: 102 MMOL/L — SIGNIFICANT CHANGE UP (ref 96–108)
CK SERPL-CCNC: 859 U/L — HIGH (ref 30–200)
CO2 SERPL-SCNC: 16 MMOL/L — LOW (ref 22–31)
CREAT SERPL-MCNC: 2.72 MG/DL — HIGH (ref 0.5–1.3)
CRP SERPL-MCNC: 23.06 MG/DL — HIGH (ref 0–0.4)
D DIMER BLD IA.RAPID-MCNC: 330 NG/ML DDU — HIGH
EOSINOPHIL # BLD AUTO: 0 K/UL — SIGNIFICANT CHANGE UP (ref 0–0.5)
EOSINOPHIL NFR BLD AUTO: 0 % — SIGNIFICANT CHANGE UP (ref 0–6)
FERRITIN SERPL-MCNC: 3603 NG/ML — HIGH (ref 30–400)
GLUCOSE BLDC GLUCOMTR-MCNC: 102 MG/DL — HIGH (ref 70–99)
GLUCOSE BLDC GLUCOMTR-MCNC: 103 MG/DL — HIGH (ref 70–99)
GLUCOSE BLDC GLUCOMTR-MCNC: 112 MG/DL — HIGH (ref 70–99)
GLUCOSE SERPL-MCNC: 155 MG/DL — HIGH (ref 70–99)
HCT VFR BLD CALC: 36.8 % — LOW (ref 39–50)
HGB BLD-MCNC: 12.9 G/DL — LOW (ref 13–17)
IMM GRANULOCYTES NFR BLD AUTO: 1 % — SIGNIFICANT CHANGE UP (ref 0–1.5)
LYMPHOCYTES # BLD AUTO: 0.45 K/UL — LOW (ref 1–3.3)
LYMPHOCYTES # BLD AUTO: 9 % — LOW (ref 13–44)
MAGNESIUM SERPL-MCNC: 2 MG/DL — SIGNIFICANT CHANGE UP (ref 1.6–2.6)
MCHC RBC-ENTMCNC: 32.1 PG — SIGNIFICANT CHANGE UP (ref 27–34)
MCHC RBC-ENTMCNC: 35.1 GM/DL — SIGNIFICANT CHANGE UP (ref 32–36)
MCV RBC AUTO: 91.5 FL — SIGNIFICANT CHANGE UP (ref 80–100)
MONOCYTES # BLD AUTO: 0.23 K/UL — SIGNIFICANT CHANGE UP (ref 0–0.9)
MONOCYTES NFR BLD AUTO: 4.6 % — SIGNIFICANT CHANGE UP (ref 2–14)
NEUTROPHILS # BLD AUTO: 4.25 K/UL — SIGNIFICANT CHANGE UP (ref 1.8–7.4)
NEUTROPHILS NFR BLD AUTO: 85.4 % — HIGH (ref 43–77)
NRBC # BLD: 0 /100 WBCS — SIGNIFICANT CHANGE UP (ref 0–0)
PHOSPHATE SERPL-MCNC: 3.8 MG/DL — SIGNIFICANT CHANGE UP (ref 2.5–4.5)
PLATELET # BLD AUTO: 152 K/UL — SIGNIFICANT CHANGE UP (ref 150–400)
POTASSIUM SERPL-MCNC: 3.7 MMOL/L — SIGNIFICANT CHANGE UP (ref 3.5–5.3)
POTASSIUM SERPL-SCNC: 3.7 MMOL/L — SIGNIFICANT CHANGE UP (ref 3.5–5.3)
PROT SERPL-MCNC: 5.8 G/DL — LOW (ref 6–8.3)
RBC # BLD: 4.02 M/UL — LOW (ref 4.2–5.8)
RBC # FLD: 14.1 % — SIGNIFICANT CHANGE UP (ref 10.3–14.5)
SODIUM SERPL-SCNC: 135 MMOL/L — SIGNIFICANT CHANGE UP (ref 135–145)
T3 SERPL-MCNC: 52 NG/DL — LOW (ref 80–200)
WBC # BLD: 4.98 K/UL — SIGNIFICANT CHANGE UP (ref 3.8–10.5)
WBC # FLD AUTO: 4.98 K/UL — SIGNIFICANT CHANGE UP (ref 3.8–10.5)

## 2021-02-16 PROCEDURE — 99233 SBSQ HOSP IP/OBS HIGH 50: CPT | Mod: CS,GC

## 2021-02-16 RX ORDER — ZINC SULFATE TAB 220 MG (50 MG ZINC EQUIVALENT) 220 (50 ZN) MG
220 TAB ORAL DAILY
Refills: 0 | Status: DISCONTINUED | OUTPATIENT
Start: 2021-02-16 | End: 2021-02-23

## 2021-02-16 RX ORDER — AMIODARONE HYDROCHLORIDE 400 MG/1
400 TABLET ORAL EVERY 12 HOURS
Refills: 0 | Status: DISCONTINUED | OUTPATIENT
Start: 2021-02-16 | End: 2021-02-17

## 2021-02-16 RX ORDER — APIXABAN 2.5 MG/1
5 TABLET, FILM COATED ORAL EVERY 12 HOURS
Refills: 0 | Status: DISCONTINUED | OUTPATIENT
Start: 2021-02-16 | End: 2021-02-16

## 2021-02-16 RX ORDER — APIXABAN 2.5 MG/1
2.5 TABLET, FILM COATED ORAL EVERY 12 HOURS
Refills: 0 | Status: DISCONTINUED | OUTPATIENT
Start: 2021-02-16 | End: 2021-02-23

## 2021-02-16 RX ORDER — ASCORBIC ACID 60 MG
250 TABLET,CHEWABLE ORAL DAILY
Refills: 0 | Status: DISCONTINUED | OUTPATIENT
Start: 2021-02-16 | End: 2021-02-17

## 2021-02-16 RX ADMIN — Medication 1 MILLIGRAM(S): at 11:10

## 2021-02-16 RX ADMIN — Medication 25 MILLIGRAM(S): at 17:57

## 2021-02-16 RX ADMIN — APIXABAN 2.5 MILLIGRAM(S): 2.5 TABLET, FILM COATED ORAL at 11:10

## 2021-02-16 RX ADMIN — PANTOPRAZOLE SODIUM 40 MILLIGRAM(S): 20 TABLET, DELAYED RELEASE ORAL at 05:26

## 2021-02-16 RX ADMIN — APIXABAN 2.5 MILLIGRAM(S): 2.5 TABLET, FILM COATED ORAL at 22:34

## 2021-02-16 RX ADMIN — Medication 1 MILLIGRAM(S): at 18:58

## 2021-02-16 RX ADMIN — AMIODARONE HYDROCHLORIDE 400 MILLIGRAM(S): 400 TABLET ORAL at 06:01

## 2021-02-16 RX ADMIN — Medication 100 MILLIGRAM(S): at 11:10

## 2021-02-16 RX ADMIN — Medication 250 MILLIGRAM(S): at 11:10

## 2021-02-16 RX ADMIN — PIPERACILLIN AND TAZOBACTAM 200 GRAM(S): 4; .5 INJECTION, POWDER, LYOPHILIZED, FOR SOLUTION INTRAVENOUS at 00:46

## 2021-02-16 RX ADMIN — Medication 25 MILLIGRAM(S): at 05:26

## 2021-02-16 RX ADMIN — HEPARIN SODIUM 5 UNIT(S)/HR: 5000 INJECTION INTRAVENOUS; SUBCUTANEOUS at 04:38

## 2021-02-16 RX ADMIN — AMIODARONE HYDROCHLORIDE 400 MILLIGRAM(S): 400 TABLET ORAL at 17:57

## 2021-02-16 RX ADMIN — CHLORHEXIDINE GLUCONATE 1 APPLICATION(S): 213 SOLUTION TOPICAL at 05:26

## 2021-02-16 RX ADMIN — ZINC SULFATE TAB 220 MG (50 MG ZINC EQUIVALENT) 220 MILLIGRAM(S): 220 (50 ZN) TAB at 11:10

## 2021-02-16 RX ADMIN — Medication 1 TABLET(S): at 11:10

## 2021-02-16 RX ADMIN — PIPERACILLIN AND TAZOBACTAM 200 GRAM(S): 4; .5 INJECTION, POWDER, LYOPHILIZED, FOR SOLUTION INTRAVENOUS at 13:36

## 2021-02-16 NOTE — PROGRESS NOTE ADULT - PROBLEM SELECTOR PLAN 5
Essential tremor. Plan: Tremor for >10yrs, worse with intention. Self-medicates with vodka, he takes 2 shots of vodka every morning one martini at night with improvement of his symptoms. There has been no increase in the amount of vodka he drinks during acute worsening of tremor. Worsening likely 2/2 acute infection and dehydration.   -Pt independent in ADLs at baseline, however daughter requesting assessment for home health aid after discharge.  -F/u social work consult.  -starting ativan 1mg q8 prn 2/16 for treatment of tremors    #Alcohol use  Drinks as above. Recent CIWAs max 8.  -Ativan 1mg PRN for CIWA >8  -CIWA protocol, monitor for signs of alcohol withdrawal

## 2021-02-16 NOTE — PROGRESS NOTE ADULT - PROBLEM SELECTOR PLAN 9
Has hx of PE, per daughter ~5yrs ago. First time provoked after hernia surgery, was on xarelto for a few months, then unprovoked in 2016 off xarelto after 3 months, then resumed Xarelto. Currently on xarelto 10mg qd for indefinite treatment of PE. No hx of malignancy, immobilization, or other risk factors, likely unprovoked. xarelto contraindicated in BERTRAND.   - hep gtt -> switched to eliquis 2.5 BID 2/16  - f/u echo to r/o right heart strain  - consider duplex bl lower extrem u/s

## 2021-02-16 NOTE — PROGRESS NOTE ADULT - ASSESSMENT
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 Transferred to 2WO due to worsening sepsis then stepped up to 3WO 2/15 for AFib RVR and worsening sepsis requiring phenylephrine gtt, now off phenylephrine, HR stabilized on amiodarone and AC with eliquis, stable for transfer back to 2W    NEURO:  #Essential tremor. Plan: Tremor for >10yrs, worse with intention. Self-medicates with vodka, he takes 2 shots of vodka every morning one martini at night with improvement of his symptoms. There has been no increase in the amount of vodka he drinks during acute worsening of tremor. Worsening likely 2/2 acute infection and dehydration.   -Pt independent in ADLs at baseline, however daughter requesting assessment for home health aid after discharge.  -F/u social work consult.  -starting ativan 1mg q8 prn 2/16 for treatment of tremors    #Alcohol use  Drinks as above. Recent CIWAs max 4.  -CIWA protocol, monitor for signs of alcohol withdrawal      PULM:    #Pneumonia due to COVID-19 virus.    COVID PCR positive (2/13/21)  -CXR w/ diffuse infiltrates b/l  -O2 requirements: 95% on 2L nc  -Covid labs: CRP 33.21, ferritin 1853  - not eligible for remdesivir based on O2sat and BERTRAND. Procalcitonin significantly elevated, procalcitonin 67.29  - new overnight 2/14 high fevers up to 106F s/p acetaminophen 650mg PO x1 and rectal x1, afebrile in last 24 horus  - started 2/15 on IV decadron 6mg qd x 10 day given new O2 requirement.     #Bacterial pneumonia.   Febrile to 102 in ED, w/ significantly elevated procal 67.29.  CXR w/o focal consolidation, though crackles bilaterally. S/p ceftriaxone 1g, azithromycin 500mg in ED  -started on ceftriaxone and azithromycin for CAP coverage  then switched to zosyn 2/15  -f/u urine legionella and urine strep to eval for atypical pna  -f/u sputum Cxsputu  -f/u repeat BCx 2/15 -> ngtd  -f/u MRSA swab negative (2/15)    #Chronic pulmonary embolism, unspecified pulmonary embolism type, unspecified whether acute cor pulmonale present.  Has hx of PE, per daughter ~5yrs ago. First time provoked after hernia surgery, was on xarelto for a few months, then unprovoked in 2016 off xarelto after 3 months, then resumed Xarelto. Currently on xarelto 10mg qd for indefinite treatment of PE. No hx of malignancy, immobilization, or other risk factors, likely unprovoked. xarelto contraindicated in BERTRAND.   - hep gtt -> switched to eliquis 2.5 BID 2/16  - f/u echo to r/o right heart strain  - duplex bl lower extrem u/s    CARDIOVASCULAR:    #AFib with RVR  No known hx AFib. Had 16sec of PAT with HR 150s around 2am 2/15. Also noted to have rapid afib in HR 150s. s/p IV lopressor 2.5 x2, however HR still in 150s. IV amio 150mg x1 bolus. Started on PO lopressor 25 BID. Pt persistently tachycardic 120s-130s. Found to be hypotensive to systolic 70s, MAPs high 50s. 1L LR bolus ordered. 1x IV 150mg amio bolus and amio gtt ordered, transfered to 3WO. TSH WNL.  - s/p amiodarone gtt, now on amiodarone 400 BID PO    #Mitral valve prolapse.   Hx of MVP. Pt does not have a cardiologist.  -monitor BPs, outpt f/u    #Elevated trop 0.02  No chest pain, no ischemic changes on EKG. Peaked on 2/15 at .05  -Likely demand 2/2 covid-19, poor clearance from BERTRAND-on-CKD  -If c/o of chest pain, stat EKG and repeat cardiac enzymes    #Prolonged QTc  QTc 502 on admission, no hx of QTc-prolonging medications  -F/u AM EKG to monitor for improvement.     #Hypertension.    Home medication: losartan 50mg qd  -continue holding in setting of BERTRAND and sepsis.    GI:  No active issues.    RENAL:  #Acute on chronic kidney failure.   Pt reports hx of renal failure with baseline GFR ~30. Follows with Dr. Jamil Tee (nephrology) and Dr. Ishaan Gaming (urology). On admission, found to have Cr 2.93 in setting of decreased PO intake, fevers, covid-19. Cr function did not improve with fluid boluses. CK 1585. likely intrinsic process possibly 2/2 COVID vs rhabdomyolysis. Bladder scan showed retention of 500 cc, so condon placed   -condon in place, was leaking o/n into 2/16, unable to replace, now on condom catheter  -f/u bladder scan prn for urinary retention  -Monitor Cr with daily BMP, CK, monitor urine output  -Avoid nephrotoxic medications.    #Hyponatremia  Na 133 on admission, worsened to 128 after 1L NS. possibly 2/2 renal failure vs SIADH from covid-19, back up to 135 2/16  [RESOLVED]      ENDO:   No active issues    ID:     #Sepsis.  Plan: Patient had worsening sepsis. Febrile to 106F overnight 2/15=4, and tachycardic. Transferred to 2WO then 3WO for further monitoring. Likely 2/2 COVID-19 infection vs. superimposed bacterial PNA. On zosyn   - c/w COVID management, bacterial PNA management as above  - EKG: sinus tachycardia, incomplete RBBB (QRS 110ms), Left anterior fascicle block, with frequent APCs.   - no longer febrile in last 24 hrs    HEME/ONC:  No active issues    PREVENTIVE:   F: encourage PO  E: replete to K>4, Mg>2, Phos>2.5  N: regular diet  Ppx: apixaban  Code Status: full code  Dispo: covid-tele.           85M PMH b/l PE (on xarelto), mitral valve prolapse, and CKD (reports baseline GFR 30) who presented to [facility] ED c/o 5d of NBNB diarrhea, dehydration, weakness, myalgias, dry cough, and worsening of his baseline tremor, and intermittent fevers (Tmax 102F at home), found to be COVID-19+, BERTRAND on CKD. Transferred to 2WO due to worsening sepsis then stepped up to 3WO 2/15 for AFib RVR and worsening sepsis requiring phenylephrine gtt, now off phenylephrine, HR stabilized on amiodarone and AC with eliquis, stable for transfer back to 2W    NEURO:  #Essential tremor. Plan: Tremor for >10yrs, worse with intention. Self-medicates with vodka, he takes 2 shots of vodka every morning one martini at night with improvement of his symptoms. There has been no increase in the amount of vodka he drinks during acute worsening of tremor. Worsening likely 2/2 acute infection and dehydration.   -Pt independent in ADLs at baseline, however daughter requesting assessment for home health aid after discharge.  -F/u social work consult.  -starting ativan 1mg q8 prn 2/16 for treatment of tremors    #Alcohol use  Drinks as above. Recent CIWAs max 4.  -CIWA protocol, monitor for signs of alcohol withdrawal      PULM:    #Pneumonia due to COVID-19 virus.    COVID PCR positive (2/13/21)  -CXR w/ diffuse infiltrates b/l  -O2 requirements: 95% on 2L nc  -Covid labs: CRP 33.21, ferritin 1853  - not eligible for remdesivir based on O2sat and BERTRAND. Procalcitonin significantly elevated, procalcitonin 67.29  - new overnight 2/14 high fevers up to 106F s/p acetaminophen 650mg PO x1 and rectal x1, afebrile in last 24 horus  - started 2/15 on IV decadron 6mg qd x 10 day given new O2 requirement.     #Bacterial pneumonia.   Febrile to 102 in ED, w/ significantly elevated procal 67.29.  CXR w/o focal consolidation, though crackles bilaterally. S/p ceftriaxone 1g, azithromycin 500mg in ED  -started on ceftriaxone and azithromycin for CAP coverage  then switched to zosyn 2/15  -f/u urine legionella and urine strep to eval for atypical pna  -f/u sputum Cxsputu  -f/u repeat BCx 2/15 -> ngtd  -f/u MRSA swab negative (2/15)    #Chronic pulmonary embolism, unspecified pulmonary embolism type, unspecified whether acute cor pulmonale present.  Has hx of PE, per daughter ~5yrs ago. First time provoked after hernia surgery, was on xarelto for a few months, then unprovoked in 2016 off xarelto after 3 months, then resumed Xarelto. Currently on xarelto 10mg qd for indefinite treatment of PE. No hx of malignancy, immobilization, or other risk factors, likely unprovoked. xarelto contraindicated in BERTRAND.   - hep gtt -> switched to eliquis 2.5 BID 2/16  - f/u echo to r/o right heart strain  - duplex bl lower extrem u/s    CARDIOVASCULAR:    #AFib with RVR  No known hx AFib. Had 16sec of PAT with HR 150s around 2am 2/15. Also noted to have rapid afib in HR 150s. s/p IV lopressor 2.5 x2, however HR still in 150s. IV amio 150mg x1 bolus. Started on PO lopressor 25 BID. Pt persistently tachycardic 120s-130s. Found to be hypotensive to systolic 70s, MAPs high 50s. 1L LR bolus ordered. 1x IV 150mg amio bolus and amio gtt ordered, transfered to 3WO. TSH WNL.  - s/p amiodarone gtt, now on amiodarone 400 BID PO    #Mitral valve prolapse.   Hx of MVP. Pt does not have a cardiologist.  -monitor BPs, outpt f/u    #Elevated trop 0.02  No chest pain, no ischemic changes on EKG. Peaked on 2/15 at .05  -Likely demand 2/2 covid-19, poor clearance from BERTRAND-on-CKD  -If c/o of chest pain, stat EKG and repeat cardiac enzymes    #Prolonged QTc  QTc 502 on admission, no hx of QTc-prolonging medications  -F/u AM EKG to monitor for improvement.     #Hypertension.    Home medication: losartan 50mg qd  -continue holding in setting of BERTRAND and sepsis.    GI:  No active issues.    RENAL:  #Acute on chronic kidney failure.   Pt reports hx of renal failure with baseline GFR ~30. Follows with Dr. aJmil Tee (nephrology) and Dr. Ishaan Gaming (urology). On admission, found to have Cr 2.93 in setting of decreased PO intake, fevers, covid-19. Cr function did not improve with fluid boluses. CK 1585. likely intrinsic process possibly 2/2 COVID vs rhabdomyolysis. Bladder scan showed retention of 500 cc, so condon placed   -condon in place, was leaking o/n into 2/16, unable to replace, now on condom catheter  -f/u bladder scan prn for urinary retention  -Monitor Cr with daily BMP, CK, monitor urine output  -Avoid nephrotoxic medications.    #Hyponatremia  Na 133 on admission, worsened to 128 after 1L NS. possibly 2/2 renal failure vs SIADH from covid-19, back up to 135 2/16  [RESOLVED]      ENDO:   No active issues    ID:     #Sepsis.  Plan: Patient had worsening sepsis. Febrile to 106F overnight 2/15=4, and tachycardic. Transferred to 2WO then 3WO for further monitoring. Likely 2/2 COVID-19 infection vs. superimposed bacterial PNA. On zosyn   - c/w COVID management, bacterial PNA management as above  - EKG: sinus tachycardia, incomplete RBBB (QRS 110ms), Left anterior fascicle block, with frequent APCs.   - no longer febrile in last 24 hrs    HEME/ONC:  No active issues    MSK:  #Stage 2 sacral ulcer  - zinc 220mg qD, vitamin C 250mg qD  - on multivitamin as above for alcohol use    PREVENTIVE:   F: encourage PO  E: replete to K>4, Mg>2, Phos>2.5  N: regular diet  Ppx: apixaban  Code Status: full code  Dispo: covid-tele.           85M PMH b/l PE (on xarelto), mitral valve prolapse, and CKD (reports baseline GFR 30) who presented to [facility] ED c/o 5d of NBNB diarrhea, dehydration, weakness, myalgias, dry cough, and worsening of his baseline tremor, and intermittent fevers (Tmax 102F at home), found to be COVID-19+, BERTRAND on CKD. Transferred to 2WO due to worsening sepsis then stepped up to 3WO 2/15 for AFib RVR and worsening sepsis requiring phenylephrine gtt, now off phenylephrine, HR stabilized on amiodarone and AC with eliquis, stable for transfer back to 2W    NEURO:  #Essential tremor. Plan: Tremor for >10yrs, worse with intention. Self-medicates with vodka, he takes 2 shots of vodka every morning one martini at night with improvement of his symptoms. There has been no increase in the amount of vodka he drinks during acute worsening of tremor. Worsening likely 2/2 acute infection and dehydration.   -Pt independent in ADLs at baseline, however daughter requesting assessment for home health aid after discharge.  -F/u social work consult.  -starting ativan 1mg q8 prn 2/16 for treatment of tremors    #Alcohol use  Drinks as above. Recent CIWAs max 4.  -CIWA protocol, monitor for signs of alcohol withdrawal      PULM:    #Pneumonia due to COVID-19 virus.    COVID PCR positive (2/13/21)  -CXR w/ diffuse infiltrates b/l  -O2 requirements: 95% on 2L nc  -Covid labs: CRP 33.21, ferritin 1853  - not eligible for remdesivir based on O2sat and BERTRAND. Procalcitonin significantly elevated, procalcitonin 67.29  - new overnight 2/14 high fevers up to 106F s/p acetaminophen 650mg PO x1 and rectal x1, afebrile in last 24 horus  - started 2/15 on IV decadron 6mg qd x 10 day given new O2 requirement.     #Bacterial pneumonia.   Febrile to 102 in ED, w/ significantly elevated procal 67.29.  CXR w/o focal consolidation, though crackles bilaterally. S/p ceftriaxone 1g, azithromycin 500mg in ED  -started on ceftriaxone and azithromycin for CAP coverage  then switched to zosyn 2/15  -f/u urine legionella and urine strep to eval for atypical pna  -f/u sputum Cxsputu  -f/u repeat BCx 2/15 -> ngtd  -f/u MRSA swab negative (2/15)    #Chronic pulmonary embolism, unspecified pulmonary embolism type, unspecified whether acute cor pulmonale present.  Has hx of PE, per daughter ~5yrs ago. First time provoked after hernia surgery, was on xarelto for a few months, then unprovoked in 2016 off xarelto after 3 months, then resumed Xarelto. Currently on xarelto 10mg qd for indefinite treatment of PE. No hx of malignancy, immobilization, or other risk factors, likely unprovoked. xarelto contraindicated in BERTRAND.   - hep gtt -> switched to eliquis 2.5 BID 2/16  - f/u echo to r/o right heart strain  - consider duplex bl lower extrem u/s    CARDIOVASCULAR:    #AFib with RVR  No known hx AFib. Had 16sec of PAT with HR 150s around 2am 2/15. Also noted to have rapid afib in HR 150s. s/p IV lopressor 2.5 x2, however HR still in 150s. IV amio 150mg x1 bolus. Started on PO lopressor 25 BID. Pt persistently tachycardic 120s-130s. Found to be hypotensive to systolic 70s, MAPs high 50s. 1L LR bolus ordered. 1x IV 150mg amio bolus and amio gtt ordered, transfered to 3WO. TSH WNL.  - s/p amiodarone gtt, now on amiodarone 400 BID PO    #Mitral valve prolapse.   Hx of MVP. Pt does not have a cardiologist.  -monitor BPs, outpt f/u    #Elevated trop 0.02  No chest pain, no ischemic changes on EKG. Peaked on 2/15 at .05  -Likely demand 2/2 covid-19, poor clearance from BERTRAND-on-CKD  -If c/o of chest pain, stat EKG and repeat cardiac enzymes    #Prolonged QTc  QTc 502 on admission, no hx of QTc-prolonging medications  -F/u AM EKG to monitor for improvement.     #Hypertension.    Home medication: losartan 50mg qd  -continue holding in setting of BERTRAND and sepsis.    GI:  No active issues.    RENAL:  #Acute on chronic kidney failure.   Pt reports hx of renal failure with baseline GFR ~30. Follows with Dr. Jamil Tee (nephrology) and Dr. Ishaan Gaming (urology). On admission, found to have Cr 2.93 in setting of decreased PO intake, fevers, covid-19. Cr function did not improve with fluid boluses. CK 1585. likely intrinsic process possibly 2/2 COVID vs rhabdomyolysis. Bladder scan showed retention of 500 cc, so condon placed   -condon in place, was leaking o/n into 2/16, unable to replace, now on condom catheter  -f/u bladder scan prn for urinary retention  -Monitor Cr with daily BMP, CK, monitor urine output  -Avoid nephrotoxic medications.    #Hyponatremia  Na 133 on admission, worsened to 128 after 1L NS. possibly 2/2 renal failure vs SIADH from covid-19, back up to 135 2/16  [RESOLVED]      ENDO:   No active issues    ID:     #Sepsis.  Plan: Patient had worsening sepsis. Febrile to 106F overnight 2/15=4, and tachycardic. Transferred to 2WO then 3WO for further monitoring. Likely 2/2 COVID-19 infection vs. superimposed bacterial PNA. On zosyn   - c/w COVID management, bacterial PNA management as above  - EKG: sinus tachycardia, incomplete RBBB (QRS 110ms), Left anterior fascicle block, with frequent APCs.   - no longer febrile in last 24 hrs    HEME/ONC:  No active issues    MSK:  #Stage 2 sacral ulcer  - zinc 220mg qD, vitamin C 250mg qD  - on multivitamin as above for alcohol use    PREVENTIVE:   F: encourage PO  E: replete to K>4, Mg>2, Phos>2.5  N: regular diet  Ppx: apixaban  Code Status: full code  Dispo: covid-tele.

## 2021-02-16 NOTE — PROGRESS NOTE ADULT - PROBLEM SELECTOR PLAN 1
-COVID PCR positive (2/13/21)  -CXR w/ diffuse infiltrates b/l  -O2 requirements: 95% on 2L nc  -monitor inflammotory markers  - not eligible for remdesivir based on O2sat and BERTRAND.   -Procalcitonin significantly elevated  - started 2/15 on IV decadron 6mg qd x 10 day given new O2 requirement.

## 2021-02-16 NOTE — PROGRESS NOTE ADULT - PROBLEM SELECTOR PLAN 6
#AFib with RVR  No known hx AFib. Had 16sec of PAT with HR 150s around 2am 2/15. Also noted to have rapid afib in HR 150s. s/p IV lopressor 2.5 x2, however HR still in 150s. IV amio 150mg x1 bolus. Started on PO lopressor 25 BID. Pt persistently tachycardic 120s-130s. Found to be hypotensive to systolic 70s, MAPs high 50s. 1L LR bolus ordered. 1x IV 150mg amio bolus and amio gtt ordered, transfered to 3WO. TSH WNL.  - s/p amiodarone gtt, now on amiodarone 400 BID PO    #Prolonged QTc  QTc 502 on admission, no hx of QTc-prolonging medications  -F/u AM EKG to monitor for improvement.

## 2021-02-16 NOTE — PROGRESS NOTE ADULT - ASSESSMENT
85M PMH b/l PE (on xarelto), mitral valve prolapse, and CKD (reports baseline GFR 30) who presented to [facility] ED c/o 5d of NBNB diarrhea, dehydration, weakness, myalgias, dry cough, and worsening of his baseline tremor, and intermittent fevers (Tmax 102F at home), found to be COVID-19+, BERTRAND on CKD. Transferred to 2WO due to worsening sepsis then stepped up to 3WO 2/15 for AFib RVR and worsening sepsis requiring phenylephrine gtt, now off phenylephrine, HR stabilized on amiodarone and AC with eliquis, stable for transfer back to 2WO.

## 2021-02-16 NOTE — PROGRESS NOTE ADULT - PROBLEM SELECTOR PROBLEM 9
Nutrition, metabolism, and development symptoms Chronic pulmonary embolism, unspecified pulmonary embolism type, unspecified whether acute cor pulmonale present

## 2021-02-16 NOTE — PROGRESS NOTE ADULT - PROBLEM SELECTOR PLAN 4
Patient had worsening sepsis. Febrile to 106F overnight 2/15=4, and tachycardic. Transferred to 2WO then 3WO for further monitoring. Likely 2/2 COVID-19 infection vs. superimposed bacterial PNA. On zosyn   - c/w COVID management, bacterial PNA management as above  - EKG: sinus tachycardia, incomplete RBBB (QRS 110ms), Left anterior fascicle block, with frequent APCs.   - no longer febrile in last 24 hrs    RESOLVED

## 2021-02-16 NOTE — PROGRESS NOTE ADULT - PROBLEM SELECTOR PLAN 10
F: encourage PO  E: replete to K>4, Mg>2, Phos>2.5  N: regular diet  Ppx: apixaban  Code Status: full code  Dispo: covid-tele.

## 2021-02-16 NOTE — PROGRESS NOTE ADULT - PROBLEM SELECTOR PLAN 3
Pt reports hx of renal failure with baseline GFR ~30. Follows with Dr. Jamil Tee (nephrology) and Dr. Ishaan Gaming (urology). On admission, found to have Cr 2.93 in setting of decreased PO intake, fevers, covid-19. Cr function did not improve with fluid boluses. CK 1585. likely intrinsic process possibly 2/2 COVID vs rhabdomyolysis. Bladder scan showed retention of 500 cc, so condon placed   -condon in place, was leaking o/n into 2/16, unable to replace, now on condom catheter  -f/u bladder scan prn for urinary retention  -Monitor Cr with daily BMP, CK, monitor urine output  -Avoid nephrotoxic medications.      #Hyponatremia  Na 133 on admission, worsened to 128 after 1L NS. possibly 2/2 renal failure vs SIADH from covid-19, back up to 135 2/16  [RESOLVED]

## 2021-02-16 NOTE — PROGRESS NOTE ADULT - PROBLEM SELECTOR PLAN 7
Home medication: losartan 50mg qd  -continue holding in setting of BERTRAND and sepsis.      #Elevated trop 0.02  No chest pain, no ischemic changes on EKG. Peaked on 2/15 at .05  -Likely demand 2/2 covid-19, poor clearance from BERTRAND-on-CKD  -If c/o of chest pain, stat EKG and repeat cardiac enzymes

## 2021-02-16 NOTE — PROGRESS NOTE ADULT - PROBLEM SELECTOR PROBLEM 8
Chronic pulmonary embolism, unspecified pulmonary embolism type, unspecified whether acute cor pulmonale present Mitral valve prolapse

## 2021-02-16 NOTE — PROGRESS NOTE ADULT - SUBJECTIVE AND OBJECTIVE BOX
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 ED 2/13 c/o 5d of NBNB diarrhea, dehydration, weakness, myalgias, dry cough, and worsening of his baseline tremor, and intermittent fevers (Tmax 102F at home). Found to be COVID-19+. He was admitted for poor PO intake, BERTRAND on CKD, and was saturating well on RA. On the evening of 2/14, rapid response called for fever to 106F, hypoxia requiring 2L NC and tachypnea. Patient hyperthermic but awake, alert and oriented to baseline, and was protecting his airway. Was given additional 650mg Tylenol suppository, active cooling, including ice bath, ice packs to groin and central pulses, and started on Zosyn for broad coverage. He was then transferred to 2WO for worsening sepsis, now stepped up to 3WO 2/15 for AFib RVR and worsening sepsis    INTERVAL HPI/OVERNIGHT EVENTS:    O/N: ptt>200, then 125, retsarted hep gtt at much reduced dose after 1 hr, next 4am, off pressors. UOP 30-50 cc/hr, but noted to have a leak in condon. nurse tried to replace it but met resistance. condom cath placed in the interim. bladder scanned patient 25 cc, uro consult in AM. 4 AM PTT therapeutic 63.5. fyi per lab mcv should not decreased from 100 to 91 in 1 night so possibility of wrong person. comsider repeat cbc if conerned about anything, changed amio load IV to  bid (s/p 1.5 g pre po, needs another 8.5 from 2/16 am for 10.5 days)- 2/26 am last dose)    This morning: Patient was seen and examined at bedside. Unable to obtain ROS as patient intubated and sedated.    VITAL SIGNS:  T(F): 97.1 (02-16-21 @ 09:00)  HR: 94 (02-16-21 @ 13:00)  BP: --  RR: 33 (02-16-21 @ 13:00)  SpO2: 96% (02-16-21 @ 13:00)  Wt(kg): --    PHYSICAL EXAM:    Constitutional: WDWN, NAD  HEENT: PERRL, EOMI, sclera non-icteric, neck supple, trachea midline, no masses, no JVD, MMM, good dentition  Respiratory: CTA b/l, good air entry b/l, no wheezing, no rhonchi, no rales, without accessory muscle use and no intercostal retractions  Cardiovascular: RRR, normal S1S2, no M/R/G  Gastrointestinal: soft, NTND, no masses palpable, BS normal  Extremities: Warm, well perfused, pulses equal bilateral upper and lower extremities, no edema, no clubbing  Neurological: AAOx3, CN Grossly intact  Skin: Normal temperature, warm, dry    MEDICATIONS  (STANDING):  aMIOdarone    Tablet 400 milliGRAM(s) Oral every 12 hours  aMIOdarone Infusion 0.5 mG/Min (16.7 mL/Hr) IV Continuous <Continuous>  apixaban 2.5 milliGRAM(s) Oral every 12 hours  ascorbic acid 250 milliGRAM(s) Oral daily  chlorhexidine 2% Cloths 1 Application(s) Topical <User Schedule>  dexAMETHasone  Injectable 6 milliGRAM(s) IV Push every 24 hours  folic acid 1 milliGRAM(s) Oral daily  metoprolol tartrate 25 milliGRAM(s) Oral every 12 hours  multivitamin 1 Tablet(s) Oral daily  pantoprazole    Tablet 40 milliGRAM(s) Oral before breakfast  piperacillin/tazobactam IVPB.. 4.5 Gram(s) IV Intermittent every 12 hours  thiamine 100 milliGRAM(s) Oral daily  zinc sulfate 220 milliGRAM(s) Oral daily    MEDICATIONS  (PRN):  acetaminophen   Tablet .. 650 milliGRAM(s) Oral every 4 hours PRN Temp greater or equal to 38C (100.4F), Mild Pain (1 - 3)  LORazepam     Tablet 1 milliGRAM(s) Oral every 8 hours PRN Anxiety  LORazepam   Injectable 1 milliGRAM(s) IV Push every 1 hour PRN CIWA-Ar score 8 or greater      Allergies    No Known Allergies    Intolerances        LABS:                        12.9   4.98  )-----------( 152      ( 16 Feb 2021 04:10 )             36.8     02-16    135  |  102  |  61<H>  ----------------------------<  155<H>  3.7   |  16<L>  |  2.72<H>    Ca    7.3<L>      16 Feb 2021 04:10  Phos  3.8     02-16  Mg     2.0     02-16    TPro  5.8<L>  /  Alb  2.5<L>  /  TBili  0.5  /  DBili  x   /  AST  50<H>  /  ALT  28  /  AlkPhos  149<H>  02-16    PT/INR - ( 15 Feb 2021 00:21 )   PT: 12.1 sec;   INR: 1.01          PTT - ( 16 Feb 2021 09:57 )  PTT:49.5 sec  Urinalysis Basic - ( 15 Feb 2021 12:10 )    Color: Yellow / Appearance: Clear / SG: >=1.030 / pH: x  Gluc: x / Ketone: NEGATIVE  / Bili: Negative / Urobili: 0.2 E.U./dL   Blood: x / Protein: 30 mg/dL / Nitrite: NEGATIVE   Leuk Esterase: NEGATIVE / RBC: > 10 /HPF / WBC < 5 /HPF   Sq Epi: x / Non Sq Epi: 0-5 /HPF / Bacteria: Present /HPF              RADIOLOGY & ADDITIONAL TESTS:  Reviewed ****************** TRANSFER NOTE 3WO TO 2WO ******************    HOSPITAL COURSE:     85M PMH of b/l PE (provoked, 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 Found to be COVID-19+. He was admitted for poor PO intake, BERTRAND on CKD, and was saturating well on RA. On the evening of 2/14, rapid response called for fever to 106F, hypoxia requiring 2L NC and tachypnea. Patient hyperthermic but awake, alert and oriented to baseline, and was protecting his airway. Was given additional 650mg Tylenol suppository, active cooling, including ice bath, ice packs to groin and central pulses, and started on Zosyn for broad coverage. He was then transferred to 2WO for worsening sepsis, and stepped up to 3WO 2/15 for AFib RVR and worsening sepsis. On 3WO his HR came down, he completed the amiodarone gtt and transitioned to PO amiodarone, and heparin gtt was transitioned to apixaban. He was started on clonazepam     INTERVAL HPI/OVERNIGHT EVENTS:    O/N: ptt>200, then 125, retsarted hep gtt at much reduced dose after 1 hr, next 4am, off pressors. UOP 30-50 cc/hr, but noted to have a leak in condon. nurse tried to replace it but met resistance. condom cath placed in the interim. bladder scanned patient 25 cc, uro consult in AM. 4 AM PTT therapeutic 63.5. fyi per lab mcv should not decreased from 100 to 91 in 1 night so possibility of wrong person. comsider repeat cbc if conerned about anything, changed amio load IV to  bid (s/p 1.5 g pre po, needs another 8.5 from 2/16 am for 10.5 days)- 2/26 am last dose)    This morning: Patient was seen and examined at bedside. Unable to obtain ROS as patient intubated and sedated.    VITAL SIGNS:  T(F): 97.1 (02-16-21 @ 09:00)  HR: 94 (02-16-21 @ 13:00)  BP: --  RR: 33 (02-16-21 @ 13:00)  SpO2: 96% (02-16-21 @ 13:00)  Wt(kg): --    PHYSICAL EXAM:    Constitutional: WDWN, NAD  HEENT: PERRL, EOMI, sclera non-icteric, neck supple, trachea midline, no masses, no JVD, MMM, good dentition  Respiratory: CTA b/l, good air entry b/l, no wheezing, no rhonchi, no rales, without accessory muscle use and no intercostal retractions  Cardiovascular: RRR, normal S1S2, no M/R/G  Gastrointestinal: soft, NTND, no masses palpable, BS normal  Extremities: Warm, well perfused, pulses equal bilateral upper and lower extremities, no edema, no clubbing  Neurological: AAOx3, CN Grossly intact  Skin: Normal temperature, warm, dry    MEDICATIONS  (STANDING):  aMIOdarone    Tablet 400 milliGRAM(s) Oral every 12 hours  aMIOdarone Infusion 0.5 mG/Min (16.7 mL/Hr) IV Continuous <Continuous>  apixaban 2.5 milliGRAM(s) Oral every 12 hours  ascorbic acid 250 milliGRAM(s) Oral daily  chlorhexidine 2% Cloths 1 Application(s) Topical <User Schedule>  dexAMETHasone  Injectable 6 milliGRAM(s) IV Push every 24 hours  folic acid 1 milliGRAM(s) Oral daily  metoprolol tartrate 25 milliGRAM(s) Oral every 12 hours  multivitamin 1 Tablet(s) Oral daily  pantoprazole    Tablet 40 milliGRAM(s) Oral before breakfast  piperacillin/tazobactam IVPB.. 4.5 Gram(s) IV Intermittent every 12 hours  thiamine 100 milliGRAM(s) Oral daily  zinc sulfate 220 milliGRAM(s) Oral daily    MEDICATIONS  (PRN):  acetaminophen   Tablet .. 650 milliGRAM(s) Oral every 4 hours PRN Temp greater or equal to 38C (100.4F), Mild Pain (1 - 3)  LORazepam     Tablet 1 milliGRAM(s) Oral every 8 hours PRN Anxiety  LORazepam   Injectable 1 milliGRAM(s) IV Push every 1 hour PRN CIWA-Ar score 8 or greater      Allergies    No Known Allergies    Intolerances        LABS:                        12.9   4.98  )-----------( 152      ( 16 Feb 2021 04:10 )             36.8     02-16    135  |  102  |  61<H>  ----------------------------<  155<H>  3.7   |  16<L>  |  2.72<H>    Ca    7.3<L>      16 Feb 2021 04:10  Phos  3.8     02-16  Mg     2.0     02-16    TPro  5.8<L>  /  Alb  2.5<L>  /  TBili  0.5  /  DBili  x   /  AST  50<H>  /  ALT  28  /  AlkPhos  149<H>  02-16    PT/INR - ( 15 Feb 2021 00:21 )   PT: 12.1 sec;   INR: 1.01          PTT - ( 16 Feb 2021 09:57 )  PTT:49.5 sec  Urinalysis Basic - ( 15 Feb 2021 12:10 )    Color: Yellow / Appearance: Clear / SG: >=1.030 / pH: x  Gluc: x / Ketone: NEGATIVE  / Bili: Negative / Urobili: 0.2 E.U./dL   Blood: x / Protein: 30 mg/dL / Nitrite: NEGATIVE   Leuk Esterase: NEGATIVE / RBC: > 10 /HPF / WBC < 5 /HPF   Sq Epi: x / Non Sq Epi: 0-5 /HPF / Bacteria: Present /HPF              RADIOLOGY & ADDITIONAL TESTS:  Reviewed ****************** TRANSFER NOTE 3WO TO 2WO ******************    HOSPITAL COURSE:     85M PMH of b/l PE (provoked, 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 Found to be COVID-19+. He was admitted for poor PO intake, BERTRAND on CKD, and was saturating well on RA. On the evening of 2/14, rapid response called for fever to 106F, hypoxia requiring 2L NC and tachypnea. Patient hyperthermic but awake, alert and oriented to baseline, and was protecting his airway. Was given additional 650mg Tylenol suppository, active cooling, including ice bath, ice packs to groin and central pulses, and started on Zosyn for broad coverage. He was then transferred to 2WO for worsening sepsis, and stepped up to 3WO 2/15 for AFib RVR and worsening sepsis. On 3WO his HR came down, he was weaned off phenylephrine, completed the amiodarone gtt and transitioned to PO amiodarone, and heparin gtt was transitioned to apixaban. He was then deemed stable for transfer to 2WO 2/16.    INTERVAL HPI/OVERNIGHT EVENTS:    O/N: ptt>200, then 125, restarted hep gtt at much reduced dose after 1 hr, next 4am, off pressors. UOP 30-50 cc/hr, but noted to have a leak in condon. nurse tried to replace it but met resistance. condom cath placed in the interim. bladder scanned patient 25 cc. 4 AM PTT therapeutic 63.5. fyi per lab mcv decreased from 100 to 91 in 1 night. changed amio load IV to  bid (s/p 1.5 g pre po, needs another 8.5 from 2/16 am for 10.5 days)- 2/26 am last dose)    This morning: Patient was seen and examined at bedside. Breathing labored similar to yesterday. No BMs x2 days. Having R shoulder pain (mild) and leg pain bilaterally (chronic). Saying he is very bothered by his arm tremors which improve with alcohol and is asking for further recommendations on how to proceed in the future.    VITAL SIGNS:  T(F): 97.1 (02-16-21 @ 09:00)  HR: 94 (02-16-21 @ 13:00)  BP: --  RR: 33 (02-16-21 @ 13:00)  SpO2: 96% (02-16-21 @ 13:00)  Wt(kg): --    PHYSICAL EXAM:    GENERAL: Sitting in bed, comfortable appearing,   HEENT: dilated pupils, hard of hearing  CV: distant heart sounds  LUNGS: CTAB, breathing comfortably satting 90s on 4L NC  ABDOMEN: soft, mildly tender to palpation and distended  EXT: cool hands, legs WWP, no edema, legs tender to palpation  VASC: pulses diminished  Neuro: A&O x3      MEDICATIONS  (STANDING):  aMIOdarone    Tablet 400 milliGRAM(s) Oral every 12 hours  aMIOdarone Infusion 0.5 mG/Min (16.7 mL/Hr) IV Continuous <Continuous>  apixaban 2.5 milliGRAM(s) Oral every 12 hours  ascorbic acid 250 milliGRAM(s) Oral daily  chlorhexidine 2% Cloths 1 Application(s) Topical <User Schedule>  dexAMETHasone  Injectable 6 milliGRAM(s) IV Push every 24 hours  folic acid 1 milliGRAM(s) Oral daily  metoprolol tartrate 25 milliGRAM(s) Oral every 12 hours  multivitamin 1 Tablet(s) Oral daily  pantoprazole    Tablet 40 milliGRAM(s) Oral before breakfast  piperacillin/tazobactam IVPB.. 4.5 Gram(s) IV Intermittent every 12 hours  thiamine 100 milliGRAM(s) Oral daily  zinc sulfate 220 milliGRAM(s) Oral daily    MEDICATIONS  (PRN):  acetaminophen   Tablet .. 650 milliGRAM(s) Oral every 4 hours PRN Temp greater or equal to 38C (100.4F), Mild Pain (1 - 3)  LORazepam     Tablet 1 milliGRAM(s) Oral every 8 hours PRN Anxiety  LORazepam   Injectable 1 milliGRAM(s) IV Push every 1 hour PRN CIWA-Ar score 8 or greater      Allergies    No Known Allergies    Intolerances        LABS:                        12.9   4.98  )-----------( 152      ( 16 Feb 2021 04:10 )             36.8     02-16    135  |  102  |  61<H>  ----------------------------<  155<H>  3.7   |  16<L>  |  2.72<H>    Ca    7.3<L>      16 Feb 2021 04:10  Phos  3.8     02-16  Mg     2.0     02-16    TPro  5.8<L>  /  Alb  2.5<L>  /  TBili  0.5  /  DBili  x   /  AST  50<H>  /  ALT  28  /  AlkPhos  149<H>  02-16    PT/INR - ( 15 Feb 2021 00:21 )   PT: 12.1 sec;   INR: 1.01          PTT - ( 16 Feb 2021 09:57 )  PTT:49.5 sec  Urinalysis Basic - ( 15 Feb 2021 12:10 )    Color: Yellow / Appearance: Clear / SG: >=1.030 / pH: x  Gluc: x / Ketone: NEGATIVE  / Bili: Negative / Urobili: 0.2 E.U./dL   Blood: x / Protein: 30 mg/dL / Nitrite: NEGATIVE   Leuk Esterase: NEGATIVE / RBC: > 10 /HPF / WBC < 5 /HPF   Sq Epi: x / Non Sq Epi: 0-5 /HPF / Bacteria: Present /HPF              RADIOLOGY & ADDITIONAL TESTS:  Reviewed ****************** TRANSFER NOTE 3WO TO 2WO ******************    HOSPITAL COURSE:     85M PMH of b/l PE (provoked, 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 ED 2/13 c/o 5d of NBNB diarrhea, dehydration, weakness, myalgias, dry cough, and worsening of his baseline tremor, and intermittent fevers (Tmax 102F at home). Found to be COVID-19+. He was admitted for poor PO intake, BERTRAND on CKD, and was saturating well on RA. On the evening of 2/14, rapid response called for fever to 106F, hypoxia requiring 2L NC and tachypnea. Patient hyperthermic but awake, alert and oriented to baseline, and was protecting his airway. Was given additional 650mg Tylenol suppository, active cooling, including ice bath, ice packs to groin and central pulses, and started on Zosyn for broad coverage. He was then transferred to 2WO for worsening sepsis, and stepped up to 3WO 2/15 for AFib RVR and worsening sepsis. On 3WO his HR came down, he was weaned off phenylephrine, completed the amiodarone gtt and transitioned to PO amiodarone, and heparin gtt was transitioned to apixaban. He was then deemed stable for transfer to 2WO 2/16.    INTERVAL HPI/OVERNIGHT EVENTS:    O/N: ptt>200, then 125, restarted hep gtt at much reduced dose after 1 hr, next 4am, off pressors. UOP 30-50 cc/hr, but noted to have a leak in condon. nurse tried to replace it but met resistance. condom cath placed in the interim. bladder scanned patient 25 cc. 4 AM PTT therapeutic 63.5. fyi per lab mcv decreased from 100 to 91 in 1 night. changed amio load IV to  bid (s/p 1.5 g pre po, needs another 8.5 from 2/16 am for 10.5 days)- 2/26 am last dose)    This morning: Patient was seen and examined at bedside. Breathing labored similar to yesterday. No BMs x2 days. Having R shoulder pain (mild) and leg pain bilaterally (chronic). Saying he is very bothered by his arm tremors which improve with alcohol and is asking for further recommendations on how to proceed in the future.    VITAL SIGNS:  T(F): 97.1 (02-16-21 @ 09:00)  HR: 94 (02-16-21 @ 13:00)  BP: --  RR: 33 (02-16-21 @ 13:00)  SpO2: 96% (02-16-21 @ 13:00)  Wt(kg): --    PHYSICAL EXAM:    GENERAL: Sitting in bed, comfortable appearing,   HEENT: dilated pupils, hard of hearing  CV: distant heart sounds  LUNGS: CTAB, breathing comfortably satting 90s on 4L NC  ABDOMEN: soft, mildly tender to palpation and distended  EXT: cool hands, legs WWP, no edema, legs tender to palpation  VASC: pulses diminished  Neuro: A&O x3 with some tremor  Skin: stage 2 sacral decubitus      MEDICATIONS  (STANDING):  aMIOdarone    Tablet 400 milliGRAM(s) Oral every 12 hours  aMIOdarone Infusion 0.5 mG/Min (16.7 mL/Hr) IV Continuous <Continuous>  apixaban 2.5 milliGRAM(s) Oral every 12 hours  ascorbic acid 250 milliGRAM(s) Oral daily  chlorhexidine 2% Cloths 1 Application(s) Topical <User Schedule>  dexAMETHasone  Injectable 6 milliGRAM(s) IV Push every 24 hours  folic acid 1 milliGRAM(s) Oral daily  metoprolol tartrate 25 milliGRAM(s) Oral every 12 hours  multivitamin 1 Tablet(s) Oral daily  pantoprazole    Tablet 40 milliGRAM(s) Oral before breakfast  piperacillin/tazobactam IVPB.. 4.5 Gram(s) IV Intermittent every 12 hours  thiamine 100 milliGRAM(s) Oral daily  zinc sulfate 220 milliGRAM(s) Oral daily    MEDICATIONS  (PRN):  acetaminophen   Tablet .. 650 milliGRAM(s) Oral every 4 hours PRN Temp greater or equal to 38C (100.4F), Mild Pain (1 - 3)  LORazepam     Tablet 1 milliGRAM(s) Oral every 8 hours PRN Anxiety  LORazepam   Injectable 1 milliGRAM(s) IV Push every 1 hour PRN CIWA-Ar score 8 or greater      Allergies    No Known Allergies    Intolerances        LABS:                        12.9   4.98  )-----------( 152      ( 16 Feb 2021 04:10 )             36.8     02-16    135  |  102  |  61<H>  ----------------------------<  155<H>  3.7   |  16<L>  |  2.72<H>    Ca    7.3<L>      16 Feb 2021 04:10  Phos  3.8     02-16  Mg     2.0     02-16    TPro  5.8<L>  /  Alb  2.5<L>  /  TBili  0.5  /  DBili  x   /  AST  50<H>  /  ALT  28  /  AlkPhos  149<H>  02-16    PT/INR - ( 15 Feb 2021 00:21 )   PT: 12.1 sec;   INR: 1.01          PTT - ( 16 Feb 2021 09:57 )  PTT:49.5 sec  Urinalysis Basic - ( 15 Feb 2021 12:10 )    Color: Yellow / Appearance: Clear / SG: >=1.030 / pH: x  Gluc: x / Ketone: NEGATIVE  / Bili: Negative / Urobili: 0.2 E.U./dL   Blood: x / Protein: 30 mg/dL / Nitrite: NEGATIVE   Leuk Esterase: NEGATIVE / RBC: > 10 /HPF / WBC < 5 /HPF   Sq Epi: x / Non Sq Epi: 0-5 /HPF / Bacteria: Present /HPF              RADIOLOGY & ADDITIONAL TESTS:  Reviewed

## 2021-02-16 NOTE — PROGRESS NOTE ADULT - SUBJECTIVE AND OBJECTIVE BOX
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 Found to be COVID-19+. He was admitted for poor PO intake, BERTRAND on CKD, and was saturating well on RA. On the evening of 2/14, rapid response called for fever to 106F, hypoxia requiring 2L NC and tachypnea. Patient hyperthermic but awake, alert and oriented to baseline, and was protecting his airway. Was given additional 650mg Tylenol suppository, active cooling, including ice bath, ice packs to groin and central pulses, and started on Zosyn for broad coverage. He was then transferred to 2WO for worsening sepsis, and stepped up to 3WO 2/15 for AFib RVR and worsening sepsis. On 3WO his HR came down, he was weaned off phenylephrine, completed the amiodarone gtt and transitioned to PO amiodarone, and heparin gtt was transitioned to apixaban. He was then deemed stable for transfer to 2 2/16 Step down.         SUBJECTIVE / INTERVAL HPI: Patient seen and examined at bedside.     VITAL SIGNS:  Vital Signs Last 24 Hrs  T(C): 36.2 (16 Feb 2021 09:00), Max: 36.2 (16 Feb 2021 09:00)  T(F): 97.1 (16 Feb 2021 09:00), Max: 97.1 (16 Feb 2021 09:00)  HR: 65 (16 Feb 2021 16:00) (53 - 94)  BP: --  BP(mean): --  RR: 39 (16 Feb 2021 16:00) (27 - 40)  SpO2: 96% (16 Feb 2021 16:00) (95% - 98%)    PHYSICAL EXAM:    General: Well developed, well nourished, no acute distress  HEENT: NC/AT; PERRL, anicteric sclera; MMM  Neck: supple  Cardiovascular: +S1/S2, RRR, no murmurs, rubs, gallops  Respiratory: CTA B/L; no W/R/R  Gastrointestinal: soft, NT/ND; +BSx4  Extremities: WWP; no edema, clubbing or cyanosis  Vascular: 2+ radial, DP/PT pulses B/L  Neurological: AAOx3; no focal deficits    MEDICATIONS:  MEDICATIONS  (STANDING):  aMIOdarone    Tablet 400 milliGRAM(s) Oral every 12 hours  apixaban 2.5 milliGRAM(s) Oral every 12 hours  ascorbic acid 250 milliGRAM(s) Oral daily  chlorhexidine 2% Cloths 1 Application(s) Topical <User Schedule>  dexAMETHasone  Injectable 6 milliGRAM(s) IV Push every 24 hours  folic acid 1 milliGRAM(s) Oral daily  metoprolol tartrate 25 milliGRAM(s) Oral every 12 hours  multivitamin 1 Tablet(s) Oral daily  pantoprazole    Tablet 40 milliGRAM(s) Oral before breakfast  piperacillin/tazobactam IVPB.. 4.5 Gram(s) IV Intermittent every 12 hours  thiamine 100 milliGRAM(s) Oral daily  zinc sulfate 220 milliGRAM(s) Oral daily    MEDICATIONS  (PRN):  acetaminophen   Tablet .. 650 milliGRAM(s) Oral every 4 hours PRN Temp greater or equal to 38C (100.4F), Mild Pain (1 - 3)  LORazepam     Tablet 1 milliGRAM(s) Oral every 8 hours PRN Anxiety  LORazepam   Injectable 1 milliGRAM(s) IV Push every 1 hour PRN CIWA-Ar score 8 or greater      ALLERGIES:  Allergies    No Known Allergies    Intolerances        LABS:                        12.9   4.98  )-----------( 152      ( 16 Feb 2021 04:10 )             36.8     02-16    135  |  102  |  61<H>  ----------------------------<  155<H>  3.7   |  16<L>  |  2.72<H>    Ca    7.3<L>      16 Feb 2021 04:10  Phos  3.8     02-16  Mg     2.0     02-16    TPro  5.8<L>  /  Alb  2.5<L>  /  TBili  0.5  /  DBili  x   /  AST  50<H>  /  ALT  28  /  AlkPhos  149<H>  02-16    PT/INR - ( 15 Feb 2021 00:21 )   PT: 12.1 sec;   INR: 1.01          PTT - ( 16 Feb 2021 09:57 )  PTT:49.5 sec  Urinalysis Basic - ( 15 Feb 2021 12:10 )    Color: Yellow / Appearance: Clear / SG: >=1.030 / pH: x  Gluc: x / Ketone: NEGATIVE  / Bili: Negative / Urobili: 0.2 E.U./dL   Blood: x / Protein: 30 mg/dL / Nitrite: NEGATIVE   Leuk Esterase: NEGATIVE / RBC: > 10 /HPF / WBC < 5 /HPF   Sq Epi: x / Non Sq Epi: 0-5 /HPF / Bacteria: Present /HPF      CAPILLARY BLOOD GLUCOSE      POCT Blood Glucose.: 112 mg/dL (16 Feb 2021 11:29)      RADIOLOGY & ADDITIONAL TESTS: Reviewed.    PLAN:  Transfer note from 3 [facility] to 2 [facility] (Step down):    HOSPITAL COURSE:     85M PMH of b/l PE (provoked, then unprovoked 2016, on xarelto), mitral valve prolapse, and CKD (reports baseline GFR 30) who presented to [facility] ED 2/13 c/o 5d of NBNB diarrhea, dehydration, weakness, myalgias, dry cough, and worsening of his baseline tremor, and intermittent fevers (Tmax 102F at home). Found to be COVID-19+. He was admitted for poor PO intake, BERTRAND on CKD, and was saturating well on RA. On the evening of 2/14, rapid response called for fever to 106F, hypoxia requiring 2L NC and tachypnea. Patient hyperthermic but awake, alert and oriented to baseline, and was protecting his airway. Was given additional 650mg Tylenol suppository, active cooling, including ice bath, ice packs to groin and central pulses, and started on Zosyn for broad coverage. He was then transferred to 2WO for worsening sepsis, and stepped up to 3WO 2/15 for AFib RVR and worsening sepsis. On 3WO his HR came down, he was weaned off phenylephrine, completed the amiodarone gtt and transitioned to PO amiodarone, and heparin gtt was transitioned to apixaban. He was then deemed stable for transfer to 2 2/16 Step down.         SUBJECTIVE / INTERVAL HPI: Patient seen and examined at bedside. He had complaint of bilateral hand tremor. He reports used to drink alcohol everyday even before his meetings to control his hand shaking. He asked us to give him some alcohol to help him stop hand shaking. His both hands are shaking. He wants to see neurologist to help him with some procedure which control his hand shaking. He reports he tried all medications before and non of them worked. He denies HA, n/v, SOB, cp, abdominal pain. CIWA 8: 6 for sever arm shaking, 1 for sweating, 1 mildly anxious     VITAL SIGNS:  Vital Signs Last 24 Hrs  T(C): 36.2 (16 Feb 2021 09:00), Max: 36.2 (16 Feb 2021 09:00)  T(F): 97.1 (16 Feb 2021 09:00), Max: 97.1 (16 Feb 2021 09:00)  HR: 65 (16 Feb 2021 16:00) (53 - 94)  BP: --  BP(mean): --  RR: 39 (16 Feb 2021 16:00) (27 - 40)  SpO2: 96% (16 Feb 2021 16:00) (95% - 98%)    PHYSICAL EXAM:    General: Well developed, well nourished, no acute distress  HEENT: NC/AT; PERRL, anicteric sclera; MMM  Neck: supple  Cardiovascular: +S1/S2, RRR, no murmurs, rubs, gallops  Respiratory: CTA B/L; no W/R/R  Gastrointestinal: soft, NT/ND; +BSx4  Extremities: WWP; no edema, clubbing or cyanosis  Vascular: 2+ radial, DP/PT pulses B/L  Neurological: AAOx3; no focal deficits. B/L arm sever shaking at rest and at movement.     MEDICATIONS:  MEDICATIONS  (STANDING):  aMIOdarone    Tablet 400 milliGRAM(s) Oral every 12 hours  apixaban 2.5 milliGRAM(s) Oral every 12 hours  ascorbic acid 250 milliGRAM(s) Oral daily  chlorhexidine 2% Cloths 1 Application(s) Topical <User Schedule>  dexAMETHasone  Injectable 6 milliGRAM(s) IV Push every 24 hours  folic acid 1 milliGRAM(s) Oral daily  metoprolol tartrate 25 milliGRAM(s) Oral every 12 hours  multivitamin 1 Tablet(s) Oral daily  pantoprazole    Tablet 40 milliGRAM(s) Oral before breakfast  piperacillin/tazobactam IVPB.. 4.5 Gram(s) IV Intermittent every 12 hours  thiamine 100 milliGRAM(s) Oral daily  zinc sulfate 220 milliGRAM(s) Oral daily    MEDICATIONS  (PRN):  acetaminophen   Tablet .. 650 milliGRAM(s) Oral every 4 hours PRN Temp greater or equal to 38C (100.4F), Mild Pain (1 - 3)  LORazepam     Tablet 1 milliGRAM(s) Oral every 8 hours PRN Anxiety  LORazepam   Injectable 1 milliGRAM(s) IV Push every 1 hour PRN CIWA-Ar score 8 or greater      ALLERGIES:  Allergies    No Known Allergies    Intolerances        LABS:                        12.9   4.98  )-----------( 152      ( 16 Feb 2021 04:10 )             36.8     02-16    135  |  102  |  61<H>  ----------------------------<  155<H>  3.7   |  16<L>  |  2.72<H>    Ca    7.3<L>      16 Feb 2021 04:10  Phos  3.8     02-16  Mg     2.0     02-16    TPro  5.8<L>  /  Alb  2.5<L>  /  TBili  0.5  /  DBili  x   /  AST  50<H>  /  ALT  28  /  AlkPhos  149<H>  02-16    PT/INR - ( 15 Feb 2021 00:21 )   PT: 12.1 sec;   INR: 1.01          PTT - ( 16 Feb 2021 09:57 )  PTT:49.5 sec  Urinalysis Basic - ( 15 Feb 2021 12:10 )    Color: Yellow / Appearance: Clear / SG: >=1.030 / pH: x  Gluc: x / Ketone: NEGATIVE  / Bili: Negative / Urobili: 0.2 E.U./dL   Blood: x / Protein: 30 mg/dL / Nitrite: NEGATIVE   Leuk Esterase: NEGATIVE / RBC: > 10 /HPF / WBC < 5 /HPF   Sq Epi: x / Non Sq Epi: 0-5 /HPF / Bacteria: Present /HPF      CAPILLARY BLOOD GLUCOSE      POCT Blood Glucose.: 112 mg/dL (16 Feb 2021 11:29)      RADIOLOGY & ADDITIONAL TESTS: Reviewed.    PLAN:

## 2021-02-16 NOTE — PROGRESS NOTE ADULT - PROBLEM SELECTOR PLAN 8
Has hx of PE, per daughter ~5yrs ago. First time provoked after hernia surgery, was on xarelto for a few months, then unprovoked in 2016 off xarelto after 3 months, then resumed Xarelto. Currently on xarelto 10mg qd for indefinite treatment of PE. No hx of malignancy, immobilization, or other risk factors, likely unprovoked. xarelto contraindicated in BERTRAND.   - hep gtt -> switched to eliquis 2.5 BID 2/16  - f/u echo to r/o right heart strain  - consider duplex bl lower extrem u/s Hx of MVP. Pt does not have a cardiologist.  -monitor BPs, outpt f/u

## 2021-02-16 NOTE — PROGRESS NOTE ADULT - PROBLEM SELECTOR PLAN 2
-Febrile to 102 in ED, w/ significantly elevated procal 67.29.  -CXR w/o focal consolidation, though crackles bilaterally. S/p ceftriaxone 1g, azithromycin 500mg in ED  -On zosyn 2/15  -f/u urine legionella and urine strep to eval for atypical pna  -f/u sputum Cxsputu  -f/u repeat BCx 2/15 -> ngtd  -f/u MRSA swab negative (2/15)

## 2021-02-17 LAB
ALBUMIN SERPL ELPH-MCNC: 2.8 G/DL — LOW (ref 3.3–5)
ALP SERPL-CCNC: 292 U/L — HIGH (ref 40–120)
ALT FLD-CCNC: 40 U/L — SIGNIFICANT CHANGE UP (ref 10–45)
ANION GAP SERPL CALC-SCNC: 15 MMOL/L — SIGNIFICANT CHANGE UP (ref 5–17)
AST SERPL-CCNC: 83 U/L — HIGH (ref 10–40)
BILIRUB SERPL-MCNC: 1 MG/DL — SIGNIFICANT CHANGE UP (ref 0.2–1.2)
BUN SERPL-MCNC: 66 MG/DL — HIGH (ref 7–23)
CALCIUM SERPL-MCNC: 8 MG/DL — LOW (ref 8.4–10.5)
CHLORIDE SERPL-SCNC: 101 MMOL/L — SIGNIFICANT CHANGE UP (ref 96–108)
CO2 SERPL-SCNC: 19 MMOL/L — LOW (ref 22–31)
CREAT SERPL-MCNC: 2.83 MG/DL — HIGH (ref 0.5–1.3)
CRP SERPL-MCNC: 12.41 MG/DL — HIGH (ref 0–0.4)
D DIMER BLD IA.RAPID-MCNC: 624 NG/ML DDU — HIGH
FERRITIN SERPL-MCNC: 3233 NG/ML — HIGH (ref 30–400)
GLUCOSE SERPL-MCNC: 121 MG/DL — HIGH (ref 70–99)
HCT VFR BLD CALC: 39.2 % — SIGNIFICANT CHANGE UP (ref 39–50)
HGB BLD-MCNC: 12.9 G/DL — LOW (ref 13–17)
MAGNESIUM SERPL-MCNC: 2.4 MG/DL — SIGNIFICANT CHANGE UP (ref 1.6–2.6)
MCHC RBC-ENTMCNC: 31.7 PG — SIGNIFICANT CHANGE UP (ref 27–34)
MCHC RBC-ENTMCNC: 32.9 GM/DL — SIGNIFICANT CHANGE UP (ref 32–36)
MCV RBC AUTO: 96.3 FL — SIGNIFICANT CHANGE UP (ref 80–100)
NRBC # BLD: 0 /100 WBCS — SIGNIFICANT CHANGE UP (ref 0–0)
PHOSPHATE SERPL-MCNC: 3.8 MG/DL — SIGNIFICANT CHANGE UP (ref 2.5–4.5)
PLATELET # BLD AUTO: 164 K/UL — SIGNIFICANT CHANGE UP (ref 150–400)
POTASSIUM SERPL-MCNC: 4.2 MMOL/L — SIGNIFICANT CHANGE UP (ref 3.5–5.3)
POTASSIUM SERPL-SCNC: 4.2 MMOL/L — SIGNIFICANT CHANGE UP (ref 3.5–5.3)
PROT SERPL-MCNC: 6.2 G/DL — SIGNIFICANT CHANGE UP (ref 6–8.3)
RBC # BLD: 4.07 M/UL — LOW (ref 4.2–5.8)
RBC # FLD: 14.1 % — SIGNIFICANT CHANGE UP (ref 10.3–14.5)
SODIUM SERPL-SCNC: 135 MMOL/L — SIGNIFICANT CHANGE UP (ref 135–145)
WBC # BLD: 5.11 K/UL — SIGNIFICANT CHANGE UP (ref 3.8–10.5)
WBC # FLD AUTO: 5.11 K/UL — SIGNIFICANT CHANGE UP (ref 3.8–10.5)

## 2021-02-17 PROCEDURE — 99232 SBSQ HOSP IP/OBS MODERATE 35: CPT | Mod: CS,GC

## 2021-02-17 PROCEDURE — 93308 TTE F-UP OR LMTD: CPT | Mod: 26

## 2021-02-17 PROCEDURE — 99222 1ST HOSP IP/OBS MODERATE 55: CPT | Mod: CS

## 2021-02-17 RX ORDER — METOPROLOL TARTRATE 50 MG
50 TABLET ORAL EVERY 12 HOURS
Refills: 0 | Status: DISCONTINUED | OUTPATIENT
Start: 2021-02-17 | End: 2021-02-20

## 2021-02-17 RX ORDER — SODIUM CHLORIDE 9 MG/ML
500 INJECTION INTRAMUSCULAR; INTRAVENOUS; SUBCUTANEOUS ONCE
Refills: 0 | Status: COMPLETED | OUTPATIENT
Start: 2021-02-17 | End: 2021-02-17

## 2021-02-17 RX ORDER — FUROSEMIDE 40 MG
60 TABLET ORAL ONCE
Refills: 0 | Status: COMPLETED | OUTPATIENT
Start: 2021-02-17 | End: 2021-02-17

## 2021-02-17 RX ADMIN — SODIUM CHLORIDE 500 MILLILITER(S): 9 INJECTION INTRAMUSCULAR; INTRAVENOUS; SUBCUTANEOUS at 06:42

## 2021-02-17 RX ADMIN — AMIODARONE HYDROCHLORIDE 400 MILLIGRAM(S): 400 TABLET ORAL at 06:28

## 2021-02-17 RX ADMIN — ZINC SULFATE TAB 220 MG (50 MG ZINC EQUIVALENT) 220 MILLIGRAM(S): 220 (50 ZN) TAB at 12:27

## 2021-02-17 RX ADMIN — CHLORHEXIDINE GLUCONATE 1 APPLICATION(S): 213 SOLUTION TOPICAL at 06:30

## 2021-02-17 RX ADMIN — PANTOPRAZOLE SODIUM 40 MILLIGRAM(S): 20 TABLET, DELAYED RELEASE ORAL at 06:42

## 2021-02-17 RX ADMIN — Medication 50 MILLIGRAM(S): at 17:25

## 2021-02-17 RX ADMIN — Medication 1 TABLET(S): at 12:25

## 2021-02-17 RX ADMIN — Medication 6 MILLIGRAM(S): at 00:58

## 2021-02-17 RX ADMIN — Medication 1 MILLIGRAM(S): at 06:29

## 2021-02-17 RX ADMIN — Medication 100 MILLIGRAM(S): at 12:25

## 2021-02-17 RX ADMIN — APIXABAN 2.5 MILLIGRAM(S): 2.5 TABLET, FILM COATED ORAL at 12:25

## 2021-02-17 RX ADMIN — Medication 1 MILLIGRAM(S): at 12:25

## 2021-02-17 RX ADMIN — PIPERACILLIN AND TAZOBACTAM 200 GRAM(S): 4; .5 INJECTION, POWDER, LYOPHILIZED, FOR SOLUTION INTRAVENOUS at 01:55

## 2021-02-17 RX ADMIN — PIPERACILLIN AND TAZOBACTAM 200 GRAM(S): 4; .5 INJECTION, POWDER, LYOPHILIZED, FOR SOLUTION INTRAVENOUS at 12:27

## 2021-02-17 RX ADMIN — Medication 60 MILLIGRAM(S): at 15:15

## 2021-02-17 NOTE — CONSULT NOTE ADULT - ASSESSMENT
per Internal Medicine    84 yo M PMH b/l PE (on xarelto), mitral valve prolapse, and CKD (reports baseline GFR 30) who presented to [facility] ED c/o 5d of NBNB diarrhea, dehydration, weakness, myalgias, dry cough, and worsening of his baseline tremor, and intermittent fevers (Tmax 102F at home), found to be COVID-19+, BERTRAND on CKD. Transferred to 2WO due to worsening sepsis then stepped up to 3WO 2/15 for AFib RVR and worsening sepsis requiring phenylephrine gtt, now off phenylephrine, HR stabilized on 2WO.     Problem/Plan - 1:  ·  Problem: Pneumonia due to COVID-19 virus.  Plan: -COVID PCR positive (2/13/21)  -CXR w/ diffuse infiltrates b/l  -O2 requirements: 95% on 2L nc  -monitor inflammotory markers  - not eligible for remdesivir based on O2sat and BERTRAND.   -Procalcitonin significantly elevated  - started 2/15 on IV decadron 6mg qd x 10 day given new O2 requirement.     Problem/Plan - 2:  ·  Problem: Bacterial pneumonia.  Plan: -Febrile to 102 in ED, w/ significantly elevated procal 67.29.  -CXR w/o focal consolidation, though crackles bilaterally. S/p ceftriaxone 1g, azithromycin 500mg in ED  -On zosyn 2/15, c/w with HAP coverage x7 days  -f/u sputum Cxsputu  -f/u repeat BCx 2/15 -> ngtd  -f/u MRSA swab negative (2/15).     Problem/Plan - 3:  ·  Problem: Acute on chronic kidney failure.  Plan: Pt reports hx of renal failure with baseline GFR ~30. Follows with Dr. Jamil Tee (nephrology) and Dr. Ishaan Gaming (urology). On admission, found to have Cr 2.93 in setting of decreased PO intake, fevers, covid-19. Cr function did not improve with fluid boluses. CK 1585. likely intrinsic process possibly 2/2 COVID vs rhabdomyolysis. Bladder scan showed retention of 500 cc, so condon placed   -condon in place, was leaking o/n into 2/16, unable to replace, now on condom catheter  - now not retaining urine. continue i/o monitoring.   -Monitor Cr with daily BMP, CK,   -Avoid nephrotoxic medications.     Problem/Plan - 4:  ·  Problem: Afib.  Plan: #AFib with RVR previously in [facility] (RVR now resolved)  No known hx AFib. Had 16sec of PAT with HR 150s around 2am 2/15. Also noted to have rapid afib in HR 150s. s/p IV lopressor 2.5 x2, however HR still in 150s. IV amio 150mg x1 bolus. Started on PO lopressor 25 BID. Pt persistently tachycardic 120s-130s. Found to be hypotensive to systolic 70s, MAPs high 50s. 1L LR bolus ordered. 1x IV 150mg amio bolus and amio gtt ordered, transferred to 3WO. TSH WNL.  - d/c PO amiodarone prior to 10g bolus completion   - increased metoprolol 50 BID for rate control.   - Cardiology consult regarding afib management and eval for any diastolic HF as possible pulmonary edema during AFIB RVR episode in MICU.    #Prolonged QTc  QTc 502 on admission, no hx of QTc-prolonging medications  -careful with QTc prolonging meds.     Problem/Plan - 5:  ·  Problem: Essential tremor.  Plan: Essential tremor. Plan: Tremor for >10yrs, worse with intention. Self-medicates with vodka, he takes 2 shots of vodka every morning one martini at night with improvement of his symptoms. There has been no increase in the amount of vodka he drinks during acute worsening of tremor. Worsening likely 2/2 acute infection and dehydration.   -Pt independent in ADLs at baseline, however daughter requesting assessment for home health aid after discharge.  -F/u social work consult.  -starting ativan 1mg q8 prn 2/16 for treatment of tremors, will reassess if actually helpful, otherwise will d/c    #Alcohol use  Drinks as above. Recent CIWAs max 8.  -Ativan 1mg PRN for CIWA >8  -CIWA protocol, monitor for signs of alcohol withdrawal. Has not had elevated CIWAs on 2WO. Received ativan for tremor.     Problem/Plan - 6:  Problem: Hypertension. Plan: Home medication: losartan 50mg qd  -continue holding in setting of BERTRAND and sepsis.      #Elevated trop 0.02  No chest pain, no ischemic changes on EKG. Peaked on 2/15 at .05  -Likely demand 2/2 covid-19, poor clearance from BERTRAND-on-CKD  -If c/o of chest pain, stat EKG and repeat cardiac enzymes.    Problem/Plan - 7:  ·  Problem: Mitral valve prolapse.  Plan: Hx of MVP. Pt does not have a cardiologist.  -monitor BPs, cardiology consult.     Problem/Plan - 8:  ·  Problem: Chronic pulmonary embolism, unspecified pulmonary embolism type, unspecified whether acute cor pulmonale present.  Plan: Has hx of PE, per daughter ~5yrs ago. First time provoked after hernia surgery, was on xarelto for a few months, then unprovoked in 2016 off xarelto after 3 months, then resumed Xarelto. Currently on xarelto 10mg qd for indefinite treatment of PE. No hx of malignancy, immobilization, or other risk factors, likely unprovoked. xarelto contraindicated in BERTRAND.   - hep gtt -> switched to eliquis 2.5 BID 2/16  - f/u echo to r/o right heart strain  - consider duplex bl lower extrem u/s.     Problem/Plan - 9:  ·  Problem: Sepsis.  Plan: Patient had worsening sepsis. Febrile to 106F overnight 2/15=4, and tachycardic. Transferred to 2WO then 3WO for further monitoring. Likely 2/2 COVID-19 infection vs. superimposed bacterial PNA. On zosyn   - c/w COVID management, bacterial PNA management as above  - EKG: sinus tachycardia, incomplete RBBB (QRS 110ms), Left anterior fascicle block, with frequent APCs.   - no longer febrile in last 24 hrs    RESOLVED.     Problem/Plan - 10:  Problem: Nutrition, metabolism, and development symptoms. Plan; F: encourage PO  E: replete to K>4, Mg>2, Phos>2.5  N: regular diet  Ppx: apixaban 2.5mg  Code Status: full code  Dispo: covid-tele.

## 2021-02-17 NOTE — CHART NOTE - NSCHARTNOTEFT_GEN_A_CORE
Admitting Diagnosis:   Patient is a 85y old  Male who presents with a chief complaint of BERTRAND (16 Feb 2021 16:51)      PAST MEDICAL & SURGICAL HISTORY:  Chronic kidney disease (CKD)    Pulmonary embolism    Mitral valve prolapse    Essential tremor    H/O hemorrhoidectomy    H/O right inguinal hernia repair        Current Nutrition Order:   Diet, Regular:   Supplement Feeding Modality:  Oral  Ensure Enlive Cans or Servings Per Day:  1       Frequency:  Two Times a day (02-16-21 @ 10:33)      PO Intake: Good (%) [   ]  Fair (50-75%) [   ] Poor (<25%) [   ]- needs further observation- RN unsure. RD saw pt starting to eat breakfast, unsure exact %compleiton @ this time.    GI Issues: No current n/v/d/c last bm 2/13- see recs below to consider bowel reg    Pain: no complaints of pain during RD assessment     Skin Integrity: Stage II PI to sacrum  ordered for Vit C and zinc for wound healing optimization    Labs:   02-17    135  |  101  |  66<H>  ----------------------------<  121<H>  4.2   |  19<L>  |  2.83<H>    Ca    8.0<L>      17 Feb 2021 06:52  Phos  3.8     02-17  Mg     2.4     02-17    TPro  6.2  /  Alb  2.8<L>  /  TBili  1.0  /  DBili  x   /  AST  83<H>  /  ALT  40  /  AlkPhos  292<H>  02-17    CAPILLARY BLOOD GLUCOSE      POCT Blood Glucose.: 103 mg/dL (16 Feb 2021 22:07)  POCT Blood Glucose.: 102 mg/dL (16 Feb 2021 17:13)  POCT Blood Glucose.: 112 mg/dL (16 Feb 2021 11:29)      Medications:  MEDICATIONS  (STANDING):  apixaban 2.5 milliGRAM(s) Oral every 12 hours  chlorhexidine 2% Cloths 1 Application(s) Topical <User Schedule>  dexAMETHasone  Injectable 6 milliGRAM(s) IV Push every 24 hours  folic acid 1 milliGRAM(s) Oral daily  metoprolol tartrate 50 milliGRAM(s) Oral every 12 hours  multivitamin 1 Tablet(s) Oral daily  pantoprazole    Tablet 40 milliGRAM(s) Oral before breakfast  piperacillin/tazobactam IVPB.. 4.5 Gram(s) IV Intermittent every 12 hours  thiamine 100 milliGRAM(s) Oral daily  zinc sulfate 220 milliGRAM(s) Oral daily    MEDICATIONS  (PRN):  acetaminophen   Tablet .. 650 milliGRAM(s) Oral every 4 hours PRN Temp greater or equal to 38C (100.4F), Mild Pain (1 - 3)  LORazepam     Tablet 1 milliGRAM(s) Oral every 8 hours PRN Anxiety  LORazepam   Injectable 1 milliGRAM(s) IV Push every 1 hour PRN CIWA-Ar score 8 or greater    Admitting Anthropometrics: Height: 6' Weight: 179.9 lbs BMI: 24.3 IBW: 176.3 lbs      Weight Change: no new weights available, will continue to monitor wt trends       Estimated energy needs:   energy: 0498-5198 kcals  pro:  g  *ABW used for calculations as pt between % of IBW. (102% IBW). Nutrient needs based on [facility] standards of care for maintenance in adults, adjusted for COVID demand, fluid per team      Subjective: 85M PMH of b/l PE (provoked, then unprovoked 2016, on xarelto), mitral valve prolapse, and CKD (reports baseline GFR 30) who presented to [facility] ED 2/13 c/o 5d of NBNB diarrhea, dehydration, weakness, myalgias, dry cough, and worsening of his baseline tremor, and intermittent fevers (Tmax 102F at home). Found to be COVID-19+. He was admitted for poor PO intake, BERTRAND on CKD, and was saturating well on RA. On the evening of 2/14, rapid response called for fever to 106F, hypoxia requiring 2L NC and tachypnea. Patient hyperthermic but awake, alert and oriented to baseline, and was protecting his airway. Was given additional 650mg Tylenol suppository, active cooling, including ice bath, ice packs to groin and central pulses, and started on Zosyn for broad coverage. He was then transferred to 2WO for worsening sepsis, and stepped up to 3WO 2/15 for AFib RVR and worsening sepsis. On 3WO his HR came down, he was weaned off phenylephrine, completed the amiodarone gtt and transitioned to PO amiodarone, and heparin gtt was transitioned to apixaban. He was then deemed stable for transfer to [facility] 2/16 Step down.     On assessment, pt seen in room attempting to eat breakfast however he was reporting difficulty 2/2 tremor; RD made PCA aware immediately. Pt currently on 4L NC sating 92%; he was expressing some difficulty breathing during RD assessment and asked if the assessment could be completed at a later time; obtained info limited. Pt already ordered for soft diet + ONS to optimize intakes, see recs below. RD to follow.    Previous Nutrition Diagnosis: Increased Nutrient Needs kcal/pro RT increased demand for energy and protein AEB hypermetabolic state 2/2 viral infection (COVID19+).     Goal/Expected Outcome meet >75% EER.    Active [ X  ]  Resolved [   ]    Recommendations:  1. C/w soft diet + Ensure Enlive BID  >> closely monitor %POintake  3. Monitor POCT q6 hrs while pt ordered for Decadron  > monitor need for CST CHO + Glucerna vs. Ensure  4. C/w micronutrient supplementation to optimze wound healing d/t presence of PI  5. Please provided consistent help with meals    Education: encouraged intake; Spoke with pt about benefit of ONS; pt understanding of rationale.  Risk Level: High [   ] Moderate [ X  ] Low [   ]

## 2021-02-17 NOTE — H&P ADULT - NSICDXPASTMEDICALHX_GEN_ALL_CORE_FT
none PAST MEDICAL HISTORY:  Chronic kidney disease (CKD)     Essential tremor     Mitral valve prolapse     Pulmonary embolism

## 2021-02-17 NOTE — CONSULT NOTE ADULT - SUBJECTIVE AND OBJECTIVE BOX
HPI:  85M w/ PMH of b/l PE (on xarelto), mitral valve prolapse, and CKD (reports baseline GFR 30) who presents to [facility] ED c/o 5d of NBNB diarrhea, dehydration, weakness, myalgias, dry cough, and worsening of his baseline tremor. He has also had intermittent fevers with Tmax 102 at home. He denies recent travel or sick contacts. ROS negative for chest pain or palpitations, shortness of breath, abd pain.    He has had a baseline tremor for over a decade, worse with intention. He has been to neurologists for evaluation, no underlying etiology identified. He self-medicates with vodka, he takes 2 shots of vodka every morning one martini at night with improvement of his symptoms. There has been no increase in the amount of vodka he drinks during acute worsening of tremor. At baseline pt is independent in ADLS, lives alone. Per daughter, very dehydrated. Noticed that his tremors were worse in the AM when waking up.     Patient with AF with RVR which was treated with Amiodarone loading and also metoprolol. Patient had converted to NSR and amiodarone was discontinued. Consulted for further management.     ROS: A 10-point review of systems was otherwise negative.    PAST MEDICAL & SURGICAL HISTORY:  Chronic kidney disease (CKD)    Pulmonary embolism    Mitral valve prolapse    Essential tremor    H/O hemorrhoidectomy    H/O right inguinal hernia repair        SOCIAL HISTORY:  FAMILY HISTORY:      ALLERGIES: 	  No Known Allergies            MEDICATIONS:  acetaminophen   Tablet .. 650 milliGRAM(s) Oral every 4 hours PRN  apixaban 2.5 milliGRAM(s) Oral every 12 hours  chlorhexidine 2% Cloths 1 Application(s) Topical <User Schedule>  dexAMETHasone  Injectable 6 milliGRAM(s) IV Push every 24 hours  folic acid 1 milliGRAM(s) Oral daily  LORazepam     Tablet 1 milliGRAM(s) Oral every 8 hours PRN  LORazepam   Injectable 1 milliGRAM(s) IV Push every 1 hour PRN  metoprolol tartrate 50 milliGRAM(s) Oral every 12 hours  multivitamin 1 Tablet(s) Oral daily  pantoprazole    Tablet 40 milliGRAM(s) Oral before breakfast  piperacillin/tazobactam IVPB.. 4.5 Gram(s) IV Intermittent every 12 hours  zinc sulfate 220 milliGRAM(s) Oral daily      PHYSICAL EXAM:  T(C): 35.3 (02-17-21 @ 14:21), Max: 36.8 (02-17-21 @ 04:00)  HR: 60 (02-17-21 @ 08:00) (60 - 65)  BP: 129/79 (02-17-21 @ 08:00) (103/64 - 141/88)  RR: 25 (02-17-21 @ 08:00) (20 - 39)  SpO2: 95% (02-17-21 @ 08:00) (93% - 97%)  Wt(kg): --    GEN: Awake, comfortable. NAD.   HEENT: NCAT, PERRL, EOMI. Mucosa moist. No JVD.   RESP: B/L crackles.   CV: RRR, normal s1/s2. No m/r/g.  ABD: Soft, NTND. BS+  EXT: Warm. No edema, clubbing, or cyanosis.   NEURO: AAOx3. No focal deficits.    I&O's Summary    16 Feb 2021 07:01  -  17 Feb 2021 07:00  --------------------------------------------------------  IN: 115 mL / OUT: 1200 mL / NET: -1085 mL        	  LABS:	 	    CARDIAC MARKERS:    CARDIAC MARKERS ( 16 Feb 2021 04:10 )  x     / x     / 859 U/L / x     / x                                      12.9   5.11  )-----------( 164      ( 17 Feb 2021 06:52 )             39.2     02-17    135  |  101  |  66<H>  ----------------------------<  121<H>  4.2   |  19<L>  |  2.83<H>    Ca    8.0<L>      17 Feb 2021 06:52  Phos  3.8     02-17  Mg     2.4     02-17    TPro  6.2  /  Alb  2.8<L>  /  TBili  1.0  /  DBili  x   /  AST  83<H>  /  ALT  40  /  AlkPhos  292<H>  02-17    proBNP:   Lipid Profile:   HgA1c:   TSH:     TELEMETRY: NSR    ECG:  NSR	  ECHO: Pending     HPI:  85M w/ PMH of 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 ED c/o 5d of NBNB diarrhea, dehydration, weakness, myalgias, dry cough, and worsening of his baseline tremor. He has also had intermittent fevers with Tmax 102 at home. He denies recent travel or sick contacts. ROS negative for chest pain or palpitations, shortness of breath, abd pain.    He has had a baseline tremor for over a decade, worse with intention. He has been to neurologists for evaluation, no underlying etiology identified. He self-medicates with vodka, he takes 2 shots of vodka every morning one martini at night with improvement of his symptoms. There has been no increase in the amount of vodka he drinks during acute worsening of tremor. At baseline pt is independent in ADLS, lives alone. Per daughter, very dehydrated. Noticed that his tremors were worse in the AM when waking up.     Patient with AF with RVR which was treated with Amiodarone loading and also metoprolol. Patient had converted to NSR and amiodarone was discontinued. Consulted for further management.     ROS: A 10-point review of systems was otherwise negative.    PAST MEDICAL & SURGICAL HISTORY:  Chronic kidney disease (CKD)    Pulmonary embolism    Mitral valve prolapse    Essential tremor    H/O hemorrhoidectomy    H/O right inguinal hernia repair        SOCIAL HISTORY:  FAMILY HISTORY:      ALLERGIES: 	  No Known Allergies            MEDICATIONS:  acetaminophen   Tablet .. 650 milliGRAM(s) Oral every 4 hours PRN  apixaban 2.5 milliGRAM(s) Oral every 12 hours  chlorhexidine 2% Cloths 1 Application(s) Topical <User Schedule>  dexAMETHasone  Injectable 6 milliGRAM(s) IV Push every 24 hours  folic acid 1 milliGRAM(s) Oral daily  LORazepam     Tablet 1 milliGRAM(s) Oral every 8 hours PRN  LORazepam   Injectable 1 milliGRAM(s) IV Push every 1 hour PRN  metoprolol tartrate 50 milliGRAM(s) Oral every 12 hours  multivitamin 1 Tablet(s) Oral daily  pantoprazole    Tablet 40 milliGRAM(s) Oral before breakfast  piperacillin/tazobactam IVPB.. 4.5 Gram(s) IV Intermittent every 12 hours  zinc sulfate 220 milliGRAM(s) Oral daily      PHYSICAL EXAM:  T(C): 35.3 (02-17-21 @ 14:21), Max: 36.8 (02-17-21 @ 04:00)  HR: 60 (02-17-21 @ 08:00) (60 - 65)  BP: 129/79 (02-17-21 @ 08:00) (103/64 - 141/88)  RR: 25 (02-17-21 @ 08:00) (20 - 39)  SpO2: 95% (02-17-21 @ 08:00) (93% - 97%)  Wt(kg): --    GEN: Awake, comfortable. NAD.   HEENT: NCAT, PERRL, EOMI. Mucosa moist. No JVD.   RESP: B/L crackles.   CV: RRR, normal s1/s2. No m/r/g.  ABD: Soft, NTND. BS+  EXT: Warm. No edema, clubbing, or cyanosis.   NEURO: AAOx3. No focal deficits.    I&O's Summary    16 Feb 2021 07:01  -  17 Feb 2021 07:00  --------------------------------------------------------  IN: 115 mL / OUT: 1200 mL / NET: -1085 mL        	  LABS:	 	    CARDIAC MARKERS:    CARDIAC MARKERS ( 16 Feb 2021 04:10 )  x     / x     / 859 U/L / x     / x                                      12.9   5.11  )-----------( 164      ( 17 Feb 2021 06:52 )             39.2     02-17    135  |  101  |  66<H>  ----------------------------<  121<H>  4.2   |  19<L>  |  2.83<H>    Ca    8.0<L>      17 Feb 2021 06:52  Phos  3.8     02-17  Mg     2.4     02-17    TPro  6.2  /  Alb  2.8<L>  /  TBili  1.0  /  DBili  x   /  AST  83<H>  /  ALT  40  /  AlkPhos  292<H>  02-17    proBNP:   Lipid Profile:   HgA1c:   TSH:     TELEMETRY: NSR    ECG:  NSR	  ECHO: EF 60

## 2021-02-17 NOTE — PHYSICAL THERAPY INITIAL EVALUATION ADULT - PERTINENT HX OF CURRENT PROBLEM, REHAB EVAL
Pt. is an 85 y.o male p/w diarrhea, myalgias, gen. weakness, worsening from baseline bilat UE tremors, dry cough, intermittent fevers. Pt. tested  + for COVID and was admitted for further management.

## 2021-02-17 NOTE — PROGRESS NOTE ADULT - ASSESSMENT
85M PMH b/l PE (on xarelto), mitral valve prolapse, and CKD (reports baseline GFR 30) who presented to [facility] ED c/o 5d of NBNB diarrhea, dehydration, weakness, myalgias, dry cough, and worsening of his baseline tremor, and intermittent fevers (Tmax 102F at home), found to be COVID-19+, BERTRAND on CKD. Transferred to 2WO due to worsening sepsis then stepped up to 3WO 2/15 for AFib RVR and worsening sepsis requiring phenylephrine gtt, now off phenylephrine, HR stabilized on 2WO.

## 2021-02-17 NOTE — PROGRESS NOTE ADULT - PROBLEM SELECTOR PLAN 4
#AFib with RVR previously in [facility] (RVR now resolved)  No known hx AFib. Had 16sec of PAT with HR 150s around 2am 2/15. Also noted to have rapid afib in HR 150s. s/p IV lopressor 2.5 x2, however HR still in 150s. IV amio 150mg x1 bolus. Started on PO lopressor 25 BID. Pt persistently tachycardic 120s-130s. Found to be hypotensive to systolic 70s, MAPs high 50s. 1L LR bolus ordered. 1x IV 150mg amio bolus and amio gtt ordered, transferred to 3WO. TSH WNL.  - d/c PO amiodarone prior to 10g bolus completion   - increased metoprolol 50 BID for rate control.   - Cardiology consult regarding afib management and eval for any diastolic HF as possible pulmonary edema during AFIB RVR episode in MICU.    #Prolonged QTc  QTc 502 on admission, no hx of QTc-prolonging medications  -careful with QTc prolonging meds.

## 2021-02-17 NOTE — CONSULT NOTE ADULT - SUBJECTIVE AND OBJECTIVE BOX
Patient is a 85y old  Male who presents with a chief complaint of alvin (17 Feb 2021 11:02)       HPI:  85M w/ PMH of b/l PE (on xarelto), mitral valve prolapse, and CKD (reports baseline GFR 30) who presents to [facility] ED c/o 5d of NBNB diarrhea, dehydration, weakness, myalgias, dry cough, and worsening of his baseline tremor. He has also had intermittent fevers with Tmax 102 at home. He denies recent travel or sick contacts. ROS negative for chest pain or palpitations, shortness of breath, abd pain.    He has had a baseline tremor for over a decade, worse with intention. He has been to neurologists for evaluation, no underlying etiology identified. He self-medicates with vodka, he takes 2 shots of vodka every morning one martini at night with improvement of his symptoms. There has been no increase in the amount of vodka he drinks during acute worsening of tremor. At baseline pt is independent in ADLS, lives alone. Per daughter, very dehydrated. Noticed that his tremors were worse in the AM when waking up.     In the ED,  Vitals: T98.1, HR 85, /64, RR 17, O2sat 95% on room air  Labs: Na 133, HCO3 18, BUN 54, Cr 2.93 | trop T 0.02, BNP 3014 | lactate 3.1    CRP 33.21, ferritin 1853 | procalcitonin 67.29    UA: pending    VBG: pH 7.36, pCO2 36  Imaging:    EKG: LAD, HR 85, bifasciular block (LAFB + RBBB)    CXR: diffuse infiltrates bilaterally  Interventions: ceftriaxone 1g, azithromycin 500mg, 1L IV NS. Repeat lactate after fluids 1.7.    (13 Feb 2021 20:25)      PAST MEDICAL & SURGICAL HISTORY:  Chronic kidney disease (CKD)    Pulmonary embolism    Mitral valve prolapse    Essential tremor    H/O hemorrhoidectomy    H/O right inguinal hernia repair        MEDICATIONS  (STANDING):  apixaban 2.5 milliGRAM(s) Oral every 12 hours  chlorhexidine 2% Cloths 1 Application(s) Topical <User Schedule>  dexAMETHasone  Injectable 6 milliGRAM(s) IV Push every 24 hours  folic acid 1 milliGRAM(s) Oral daily  metoprolol tartrate 50 milliGRAM(s) Oral every 12 hours  multivitamin 1 Tablet(s) Oral daily  pantoprazole    Tablet 40 milliGRAM(s) Oral before breakfast  piperacillin/tazobactam IVPB.. 4.5 Gram(s) IV Intermittent every 12 hours  zinc sulfate 220 milliGRAM(s) Oral daily    MEDICATIONS  (PRN):  acetaminophen   Tablet .. 650 milliGRAM(s) Oral every 4 hours PRN Temp greater or equal to 38C (100.4F), Mild Pain (1 - 3)  LORazepam     Tablet 1 milliGRAM(s) Oral every 8 hours PRN Anxiety  LORazepam   Injectable 1 milliGRAM(s) IV Push every 1 hour PRN CIWA-Ar score 8 or greater      FAMILY HISTORY:      CBC Full  -  ( 17 Feb 2021 06:52 )  WBC Count : 5.11 K/uL  RBC Count : 4.07 M/uL  Hemoglobin : 12.9 g/dL  Hematocrit : 39.2 %  Platelet Count - Automated : 164 K/uL  Mean Cell Volume : 96.3 fl  Mean Cell Hemoglobin : 31.7 pg  Mean Cell Hemoglobin Concentration : 32.9 gm/dL  Auto Neutrophil # : x  Auto Lymphocyte # : x  Auto Monocyte # : x  Auto Eosinophil # : x  Auto Basophil # : x  Auto Neutrophil % : x  Auto Lymphocyte % : x  Auto Monocyte % : x  Auto Eosinophil % : x  Auto Basophil % : x      02-17    135  |  101  |  66<H>  ----------------------------<  121<H>  4.2   |  19<L>  |  2.83<H>    Ca    8.0<L>      17 Feb 2021 06:52  Phos  3.8     02-17  Mg     2.4     02-17    TPro  6.2  /  Alb  2.8<L>  /  TBili  1.0  /  DBili  x   /  AST  83<H>  /  ALT  40  /  AlkPhos  292<H>  02-17            Radiology:    < from: Xray Chest 1 View-PORTABLE IMMEDIATE (Xray Chest 1 View-PORTABLE IMMEDIATE .) (02.15.21 @ 02:24) >  EXAM:  XR CHEST PORTABLE IMMED 1V                          PROCEDURE DATE:  02/15/2021          INTERPRETATION:  XR CHEST IMMEDIATE dated 2/15/2021    HISTORY: Fever with Covid-19.    COMPARISON: Chest radiograph from 2/13/2021.    FINDINGS:    Impression: Interval worsening of bilateral infiltrates, left greater than right. Stable heart size, thoracic aortic calcification. Stable bony structures.                      Vital Signs Last 24 Hrs  T(C): 36 (17 Feb 2021 10:09), Max: 36.8 (17 Feb 2021 04:00)  T(F): 96.8 (17 Feb 2021 10:09), Max: 98.2 (17 Feb 2021 04:00)  HR: 60 (17 Feb 2021 08:00) (60 - 65)  BP: 129/79 (17 Feb 2021 08:00) (103/64 - 141/88)  BP(mean): 95 (17 Feb 2021 08:00) (73 - 97)  RR: 25 (17 Feb 2021 08:00) (20 - 39)  SpO2: 95% (17 Feb 2021 08:00) (93% - 97%)     REVIEW OF SYSTEMS: as per HPI          Physical Exam:  on COVID isolation, in accordance with current standards limiting patient contact, please refer to exam performed on 2/17/2021    General: NAD, well developed  HEENT: NC/AT; anicteric sclera; moist mucosal membranes.  Neck: supple, trachea midline  Cardiovascular: RRR, +S1/S2; NO M/R/G  Respiratory: CTA B/L; no W/R/R  Gastrointestinal: soft, NT/ND; +BSx4  Extremities: WWP; no edema or cyanosis  Vascular: 2+ radial, DP/PT pulses B/L  Neurological: AAOx2 to person and year. Not place. Hard of hearing; Essential tremor at baseline.  no focal deficits      PM&R Impression:    1) deconditioned  2) no focal weakness    Plan: cleared to begin rehab    1) Physical therapy focusing on therapeutic exercises, bed mobility/transfer out of bed evaluation, progressive ambulation with assistive devices prn.    2) Anticipated Disposition Plan/Recs:    pending functional progress

## 2021-02-17 NOTE — PROGRESS NOTE ADULT - PROBLEM SELECTOR PLAN 5
Essential tremor. Plan: Tremor for >10yrs, worse with intention. Self-medicates with vodka, he takes 2 shots of vodka every morning one martini at night with improvement of his symptoms. There has been no increase in the amount of vodka he drinks during acute worsening of tremor. Worsening likely 2/2 acute infection and dehydration.   -Pt independent in ADLs at baseline, however daughter requesting assessment for home health aid after discharge.  -F/u social work consult.  -starting ativan 1mg q8 prn 2/16 for treatment of tremors, will reassess if actually helpful, otherwise will d/c    #Alcohol use  Drinks as above. Recent CIWAs max 8.  -Ativan 1mg PRN for CIWA >8  -CIWA protocol, monitor for signs of alcohol withdrawal. Has not had elevated CIWAs on 2WO. Received ativan for tremor.

## 2021-02-17 NOTE — PROGRESS NOTE ADULT - PROBLEM SELECTOR PLAN 2
-Febrile to 102 in ED, w/ significantly elevated procal 67.29.  -CXR w/o focal consolidation, though crackles bilaterally. S/p ceftriaxone 1g, azithromycin 500mg in ED  -On zosyn 2/15, c/w with HAP coverage x7 days  -f/u sputum Cxsputu  -f/u repeat BCx 2/15 -> ngtd  -f/u MRSA swab negative (2/15)

## 2021-02-17 NOTE — CONSULT NOTE ADULT - ATTENDING COMMENTS
Initial attending contact date 2/17/21     . See fellow note written above for details. I reviewed the fellow documentation. I have personally seen and examined this patient. I reviewed vitals, labs, medications, cardiac studies, and additional imaging. I agree with the above fellow's findings and plans as written above

## 2021-02-17 NOTE — PROGRESS NOTE ADULT - SUBJECTIVE AND OBJECTIVE BOX
SONYA BAILEY, 85y, Male  MRN-[id number]  Patient is a 85y old  Male who presents with a chief complaint of BERTRAND (16 Feb 2021 16:51)      OVERNIGHT EVENTS:     SUBJECTIVE:  -------------------------------------------------------------------------------  VITAL SIGNS:  Vital Signs Last 24 Hrs  T(C): 36 (17 Feb 2021 10:09), Max: 36.8 (17 Feb 2021 04:00)  T(F): 96.8 (17 Feb 2021 10:09), Max: 98.2 (17 Feb 2021 04:00)  HR: 60 (17 Feb 2021 08:00) (60 - 94)  BP: 129/79 (17 Feb 2021 08:00) (103/64 - 141/88)  BP(mean): 95 (17 Feb 2021 08:00) (73 - 97)  RR: 25 (17 Feb 2021 08:00) (20 - 39)  SpO2: 95% (17 Feb 2021 08:00) (93% - 97%)      I&O's Summary    16 Feb 2021 07:01  -  17 Feb 2021 07:00  --------------------------------------------------------  IN: 115 mL / OUT: 1200 mL / NET: -1085 mL        PHYSICAL EXAM:    General: NAD, well developed  HEENT: NC/AT; EOMI, PERRLA, anicteric sclera; moist mucosal membranes.  Neck: supple, trachea midline  Cardiovascular: RRR, +S1/S2; NO M/R/G  Respiratory: CTA B/L; no W/R/R  Gastrointestinal: soft, NT/ND; +BSx4  Extremities: WWP; no edema or cyanosis  Vascular: 2+ radial, DP/PT pulses B/L  Neurological: AAOx3; no focal deficits    ALLERGIES:  Allergies    No Known Allergies    Intolerances        MEDICATIONS:  MEDICATIONS  (STANDING):  apixaban 2.5 milliGRAM(s) Oral every 12 hours  chlorhexidine 2% Cloths 1 Application(s) Topical <User Schedule>  dexAMETHasone  Injectable 6 milliGRAM(s) IV Push every 24 hours  folic acid 1 milliGRAM(s) Oral daily  metoprolol tartrate 50 milliGRAM(s) Oral every 12 hours  multivitamin 1 Tablet(s) Oral daily  pantoprazole    Tablet 40 milliGRAM(s) Oral before breakfast  piperacillin/tazobactam IVPB.. 4.5 Gram(s) IV Intermittent every 12 hours  thiamine 100 milliGRAM(s) Oral daily  zinc sulfate 220 milliGRAM(s) Oral daily    MEDICATIONS  (PRN):  acetaminophen   Tablet .. 650 milliGRAM(s) Oral every 4 hours PRN Temp greater or equal to 38C (100.4F), Mild Pain (1 - 3)  LORazepam     Tablet 1 milliGRAM(s) Oral every 8 hours PRN Anxiety  LORazepam   Injectable 1 milliGRAM(s) IV Push every 1 hour PRN CIWA-Ar score 8 or greater      -------------------------------------------------------------------------------  LABS:                        12.9   5.11  )-----------( 164      ( 17 Feb 2021 06:52 )             39.2     02-17    135  |  101  |  66<H>  ----------------------------<  121<H>  4.2   |  19<L>  |  2.83<H>    Ca    8.0<L>      17 Feb 2021 06:52  Phos  3.8     02-17  Mg     2.4     02-17    TPro  6.2  /  Alb  2.8<L>  /  TBili  1.0  /  DBili  x   /  AST  83<H>  /  ALT  40  /  AlkPhos  292<H>  02-17    LIVER FUNCTIONS - ( 17 Feb 2021 06:52 )  Alb: 2.8 g/dL / Pro: 6.2 g/dL / ALK PHOS: 292 U/L / ALT: 40 U/L / AST: 83 U/L / GGT: x           PTT - ( 16 Feb 2021 20:23 )  PTT:29.1 sec  ABG - ( 15 Feb 2021 15:18 )  pH, Arterial: 7.49  pH, Blood: x     /  pCO2: 16    /  pO2: 108   / HCO3: 12    / Base Excess: -8.2  /  SaO2: 98                Lactate, Blood: 1.7 mmol/L (02-15 @ 00:20)  Lactate, Blood: 1.7 mmol/L (02-13 @ 18:32)  Lactate, Blood: 3.1 mmol/L (02-13 @ 16:57)    CARDIAC MARKERS ( 16 Feb 2021 04:10 )  x     / x     / 859 U/L / x     / x          Urinalysis Basic - ( 15 Feb 2021 12:10 )    Color: Yellow / Appearance: Clear / SG: >=1.030 / pH: x  Gluc: x / Ketone: NEGATIVE  / Bili: Negative / Urobili: 0.2 E.U./dL   Blood: x / Protein: 30 mg/dL / Nitrite: NEGATIVE   Leuk Esterase: NEGATIVE / RBC: > 10 /HPF / WBC < 5 /HPF   Sq Epi: x / Non Sq Epi: 0-5 /HPF / Bacteria: Present /HPF      CAPILLARY BLOOD GLUCOSE      POCT Blood Glucose.: 103 mg/dL (16 Feb 2021 22:07)  POCT Blood Glucose.: 102 mg/dL (16 Feb 2021 17:13)  POCT Blood Glucose.: 112 mg/dL (16 Feb 2021 11:29)        Culture - Blood (collected 15 Feb 2021 01:08)  Source: .Blood Blood  Preliminary Report (17 Feb 2021 02:00):    No growth at 2 days.    Culture - Blood (collected 15 Feb 2021 01:08)  Source: .Blood Blood  Preliminary Report (17 Feb 2021 02:00):    No growth at 2 days.        RADIOLOGY & ADDITIONAL TESTS: Reviewed.   SONYA BAILEY, 85y, Male  MRN-[id number]  Patient is a 85y old  Male who presents with a chief complaint of BERTRAND (16 Feb 2021 16:51)      OVERNIGHT EVENTS: Pt bladder scan overnight with 900cc output. Did not require fluid bolus overnight.     SUBJECTIVE: Pt seen and examined at bedside this AM. Confused thought he was in [place]. A&Ox2. Complained of constipation but had BM 20 minutes later.  -------------------------------------------------------------------------------  VITAL SIGNS:  Vital Signs Last 24 Hrs  T(C): 36 (17 Feb 2021 10:09), Max: 36.8 (17 Feb 2021 04:00)  T(F): 96.8 (17 Feb 2021 10:09), Max: 98.2 (17 Feb 2021 04:00)  HR: 60 (17 Feb 2021 08:00) (60 - 94)  BP: 129/79 (17 Feb 2021 08:00) (103/64 - 141/88)  BP(mean): 95 (17 Feb 2021 08:00) (73 - 97)  RR: 25 (17 Feb 2021 08:00) (20 - 39)  SpO2: 95% (17 Feb 2021 08:00) (93% - 97%) 4L NC      I&O's Summary    16 Feb 2021 07:01  -  17 Feb 2021 07:00  --------------------------------------------------------  IN: 115 mL / OUT: 1200 mL / NET: -1085 mL        PHYSICAL EXAM:    General: NAD, well developed  HEENT: NC/AT; anicteric sclera; moist mucosal membranes.  Neck: supple, trachea midline  Cardiovascular: RRR, +S1/S2; NO M/R/G  Respiratory: CTA B/L; no W/R/R  Gastrointestinal: soft, NT/ND; +BSx4  Extremities: WWP; no edema or cyanosis  Vascular: 2+ radial, DP/PT pulses B/L  Neurological: AAOx2 to person and year. Not place. Hard of hearing; Essential tremor at baseline.  no focal deficits    ALLERGIES:  Allergies    No Known Allergies    Intolerances        MEDICATIONS:  MEDICATIONS  (STANDING):  apixaban 2.5 milliGRAM(s) Oral every 12 hours  chlorhexidine 2% Cloths 1 Application(s) Topical <User Schedule>  dexAMETHasone  Injectable 6 milliGRAM(s) IV Push every 24 hours  folic acid 1 milliGRAM(s) Oral daily  metoprolol tartrate 50 milliGRAM(s) Oral every 12 hours  multivitamin 1 Tablet(s) Oral daily  pantoprazole    Tablet 40 milliGRAM(s) Oral before breakfast  piperacillin/tazobactam IVPB.. 4.5 Gram(s) IV Intermittent every 12 hours  thiamine 100 milliGRAM(s) Oral daily  zinc sulfate 220 milliGRAM(s) Oral daily    MEDICATIONS  (PRN):  acetaminophen   Tablet .. 650 milliGRAM(s) Oral every 4 hours PRN Temp greater or equal to 38C (100.4F), Mild Pain (1 - 3)  LORazepam     Tablet 1 milliGRAM(s) Oral every 8 hours PRN Anxiety  LORazepam   Injectable 1 milliGRAM(s) IV Push every 1 hour PRN CIWA-Ar score 8 or greater      -------------------------------------------------------------------------------  LABS:                        12.9   5.11  )-----------( 164      ( 17 Feb 2021 06:52 )             39.2     02-17    135  |  101  |  66<H>  ----------------------------<  121<H>  4.2   |  19<L>  |  2.83<H>    Ca    8.0<L>      17 Feb 2021 06:52  Phos  3.8     02-17  Mg     2.4     02-17    TPro  6.2  /  Alb  2.8<L>  /  TBili  1.0  /  DBili  x   /  AST  83<H>  /  ALT  40  /  AlkPhos  292<H>  02-17    LIVER FUNCTIONS - ( 17 Feb 2021 06:52 )  Alb: 2.8 g/dL / Pro: 6.2 g/dL / ALK PHOS: 292 U/L / ALT: 40 U/L / AST: 83 U/L / GGT: x           PTT - ( 16 Feb 2021 20:23 )  PTT:29.1 sec  ABG - ( 15 Feb 2021 15:18 )  pH, Arterial: 7.49  pH, Blood: x     /  pCO2: 16    /  pO2: 108   / HCO3: 12    / Base Excess: -8.2  /  SaO2: 98                Lactate, Blood: 1.7 mmol/L (02-15 @ 00:20)  Lactate, Blood: 1.7 mmol/L (02-13 @ 18:32)  Lactate, Blood: 3.1 mmol/L (02-13 @ 16:57)    CARDIAC MARKERS ( 16 Feb 2021 04:10 )  x     / x     / 859 U/L / x     / x          Urinalysis Basic - ( 15 Feb 2021 12:10 )    Color: Yellow / Appearance: Clear / SG: >=1.030 / pH: x  Gluc: x / Ketone: NEGATIVE  / Bili: Negative / Urobili: 0.2 E.U./dL   Blood: x / Protein: 30 mg/dL / Nitrite: NEGATIVE   Leuk Esterase: NEGATIVE / RBC: > 10 /HPF / WBC < 5 /HPF   Sq Epi: x / Non Sq Epi: 0-5 /HPF / Bacteria: Present /HPF      CAPILLARY BLOOD GLUCOSE      POCT Blood Glucose.: 103 mg/dL (16 Feb 2021 22:07)  POCT Blood Glucose.: 102 mg/dL (16 Feb 2021 17:13)  POCT Blood Glucose.: 112 mg/dL (16 Feb 2021 11:29)        Culture - Blood (collected 15 Feb 2021 01:08)  Source: .Blood Blood  Preliminary Report (17 Feb 2021 02:00):    No growth at 2 days.    Culture - Blood (collected 15 Feb 2021 01:08)  Source: .Blood Blood  Preliminary Report (17 Feb 2021 02:00):    No growth at 2 days.        RADIOLOGY & ADDITIONAL TESTS: Reviewed.

## 2021-02-17 NOTE — CONSULT NOTE ADULT - ASSESSMENT
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 Transferred to 2WO due to worsening sepsis then stepped up to 3WO 2/15 for AFib RVR and worsening sepsis requiring phenylephrine gtt, now off phenylephrine, HR stabilized on 2WO.     Afib with RVR: Patient says he may have had irregular rhythm in past. Currently in NSR. [id number]=6  -Continue with Eliquis 2.5mg BID   -Continue with Metoprolol tartrate 50mg BID.  -On DC, can switch to Toprol 100mg daily.  -Patient can follow up with Dr. Shane Velarde as outpatient cardiologist when ready for DC.    Pulmonary edema: Unknown EF.  -Check echo.  -Can give Lasix 60mg IVP x1  -Follow up UOP and respiratory status.     Discussed with attending and primary team. Please reconsult as needed.  85M PMH b/l PE (on xarelto), mitral valve prolapse, and CKD (reports baseline GFR 30) who presented to [facility] ED c/o 5d of NBNB diarrhea, dehydration, weakness, myalgias, dry cough, and worsening of his baseline tremor, and intermittent fevers (Tmax 102F at home), found to be COVID-19+, BERTRAND on CKD. Transferred to 2WO due to worsening sepsis then stepped up to 3WO 2/15 for AFib RVR and worsening sepsis requiring phenylephrine gtt, now off phenylephrine, HR stabilized on 2WO.     Afib with RVR: Patient says he may have had irregular rhythm in past. Currently in NSR. [id number]=9  -Continue with Eliquis 2.5mg BID   -Continue with Metoprolol tartrate 50mg BID.  -On DC, can switch to Toprol 100mg daily.  -Patient can follow up with Dr. Shane Velarde as outpatient cardiologist when ready for DC.    Pulmonary edema: Unknown EF.  -Check echo.  -Can give Lasix 60mg IVP x1  -Follow up UOP and respiratory status.     Will continue to follow    Kat Ma MD  Cardiology consult attending    Discussed with attending and primary team.  85M PMH b/l PE (on xarelto), mitral valve prolapse, and CKD (reports baseline GFR 30) who presented to [facility] ED c/o 5d of NBNB diarrhea, dehydration, weakness, myalgias, dry cough, and worsening of his baseline tremor, and intermittent fevers (Tmax 102F at home), found to be COVID-19+, BERTRAND on CKD. Transferred to 2WO due to worsening sepsis then stepped up to 3WO 2/15 for AFib RVR and worsening sepsis requiring phenylephrine gtt, now off phenylephrine, HR stabilized on 2WO.     Afib with RVR: Patient says he may have had irregular rhythm in past. Currently in NSR. [id number]  -Remains with rate controlled A fib  -Continue with Eliquis 2.5mg BID   -Continue with Metoprolol tartrate 50mg BID.  -On DC, can switch to Toprol 100mg daily.  -Patient can follow up with Dr. Shane Velarde as outpatient cardiologist when ready for DC.    Pulmonary edema: Normal EF.  -ECHO from 2/17; EF normal with normal systolic function. Dilated RV  -responded to IV diuresis, currently on RA without evidence for fluidoverload      Please reconsult as needed    Kat Ma MD  Cardiology consult attending    Discussed with attending and primary team.

## 2021-02-17 NOTE — PROGRESS NOTE ADULT - PROBLEM SELECTOR PLAN 3
Pt reports hx of renal failure with baseline GFR ~30. Follows with Dr. Jamil Tee (nephrology) and Dr. Ishaan Gaming (urology). On admission, found to have Cr 2.93 in setting of decreased PO intake, fevers, covid-19. Cr function did not improve with fluid boluses. CK 1585. likely intrinsic process possibly 2/2 COVID vs rhabdomyolysis. Bladder scan showed retention of 500 cc, so condon placed   -condon in place, was leaking o/n into 2/16, unable to replace, now on condom catheter  - now not retaining urine. continue i/o monitoring.   -Monitor Cr with daily BMP, CK,   -Avoid nephrotoxic medications.

## 2021-02-17 NOTE — PROGRESS NOTE ADULT - PROBLEM SELECTOR PLAN 10
F: encourage PO  E: replete to K>4, Mg>2, Phos>2.5  N: regular diet  Ppx: apixaban  Code Status: full code  Dispo: covid-tele. F: encourage PO  E: replete to K>4, Mg>2, Phos>2.5  N: regular diet  Ppx: apixaban 2.5mg  Code Status: full code  Dispo: covid-tele.

## 2021-02-18 LAB
ALBUMIN SERPL ELPH-MCNC: 2.8 G/DL — LOW (ref 3.3–5)
ALP SERPL-CCNC: 432 U/L — HIGH (ref 40–120)
ALT FLD-CCNC: 211 U/L — HIGH (ref 10–45)
ANION GAP SERPL CALC-SCNC: 15 MMOL/L — SIGNIFICANT CHANGE UP (ref 5–17)
ANISOCYTOSIS BLD QL: SLIGHT — SIGNIFICANT CHANGE UP
AST SERPL-CCNC: 394 U/L — HIGH (ref 10–40)
BASOPHILS # BLD AUTO: 0 K/UL — SIGNIFICANT CHANGE UP (ref 0–0.2)
BASOPHILS NFR BLD AUTO: 0 % — SIGNIFICANT CHANGE UP (ref 0–2)
BILIRUB SERPL-MCNC: 1.2 MG/DL — SIGNIFICANT CHANGE UP (ref 0.2–1.2)
BUN SERPL-MCNC: 68 MG/DL — HIGH (ref 7–23)
BURR CELLS BLD QL SMEAR: PRESENT — SIGNIFICANT CHANGE UP
CALCIUM SERPL-MCNC: 8.6 MG/DL — SIGNIFICANT CHANGE UP (ref 8.4–10.5)
CHLORIDE SERPL-SCNC: 100 MMOL/L — SIGNIFICANT CHANGE UP (ref 96–108)
CO2 SERPL-SCNC: 22 MMOL/L — SIGNIFICANT CHANGE UP (ref 22–31)
CREAT SERPL-MCNC: 2.73 MG/DL — HIGH (ref 0.5–1.3)
CULTURE RESULTS: SIGNIFICANT CHANGE UP
CULTURE RESULTS: SIGNIFICANT CHANGE UP
DACRYOCYTES BLD QL SMEAR: SLIGHT — SIGNIFICANT CHANGE UP
EOSINOPHIL # BLD AUTO: 0 K/UL — SIGNIFICANT CHANGE UP (ref 0–0.5)
EOSINOPHIL NFR BLD AUTO: 0 % — SIGNIFICANT CHANGE UP (ref 0–6)
GIANT PLATELETS BLD QL SMEAR: PRESENT — SIGNIFICANT CHANGE UP
GLUCOSE BLDC GLUCOMTR-MCNC: 137 MG/DL — HIGH (ref 70–99)
GLUCOSE BLDC GLUCOMTR-MCNC: 147 MG/DL — HIGH (ref 70–99)
GLUCOSE SERPL-MCNC: 139 MG/DL — HIGH (ref 70–99)
HCT VFR BLD CALC: 42 % — SIGNIFICANT CHANGE UP (ref 39–50)
HGB BLD-MCNC: 13.9 G/DL — SIGNIFICANT CHANGE UP (ref 13–17)
LYMPHOCYTES # BLD AUTO: 0.05 K/UL — LOW (ref 1–3.3)
LYMPHOCYTES # BLD AUTO: 0.8 % — LOW (ref 13–44)
MACROCYTES BLD QL: SLIGHT — SIGNIFICANT CHANGE UP
MAGNESIUM SERPL-MCNC: 2.5 MG/DL — SIGNIFICANT CHANGE UP (ref 1.6–2.6)
MANUAL SMEAR VERIFICATION: SIGNIFICANT CHANGE UP
MCHC RBC-ENTMCNC: 31.9 PG — SIGNIFICANT CHANGE UP (ref 27–34)
MCHC RBC-ENTMCNC: 33.1 GM/DL — SIGNIFICANT CHANGE UP (ref 32–36)
MCV RBC AUTO: 96.3 FL — SIGNIFICANT CHANGE UP (ref 80–100)
METAMYELOCYTES # FLD: 1.7 % — HIGH (ref 0–0)
MICROCYTES BLD QL: SLIGHT — SIGNIFICANT CHANGE UP
MONOCYTES # BLD AUTO: 0.15 K/UL — SIGNIFICANT CHANGE UP (ref 0–0.9)
MONOCYTES NFR BLD AUTO: 2.6 % — SIGNIFICANT CHANGE UP (ref 2–14)
MYELOCYTES NFR BLD: 0.9 % — HIGH (ref 0–0)
NEUTROPHILS # BLD AUTO: 4.95 K/UL — SIGNIFICANT CHANGE UP (ref 1.8–7.4)
NEUTROPHILS NFR BLD AUTO: 87.1 % — HIGH (ref 43–77)
OVALOCYTES BLD QL SMEAR: SLIGHT — SIGNIFICANT CHANGE UP
PHOSPHATE SERPL-MCNC: 4.7 MG/DL — HIGH (ref 2.5–4.5)
PLAT MORPH BLD: ABNORMAL
PLATELET # BLD AUTO: 199 K/UL — SIGNIFICANT CHANGE UP (ref 150–400)
POIKILOCYTOSIS BLD QL AUTO: SLIGHT — SIGNIFICANT CHANGE UP
POTASSIUM SERPL-MCNC: 4 MMOL/L — SIGNIFICANT CHANGE UP (ref 3.5–5.3)
POTASSIUM SERPL-SCNC: 4 MMOL/L — SIGNIFICANT CHANGE UP (ref 3.5–5.3)
PROT SERPL-MCNC: 6.7 G/DL — SIGNIFICANT CHANGE UP (ref 6–8.3)
RBC # BLD: 4.36 M/UL — SIGNIFICANT CHANGE UP (ref 4.2–5.8)
RBC # FLD: 14 % — SIGNIFICANT CHANGE UP (ref 10.3–14.5)
RBC BLD AUTO: ABNORMAL
SODIUM SERPL-SCNC: 137 MMOL/L — SIGNIFICANT CHANGE UP (ref 135–145)
SPECIMEN SOURCE: SIGNIFICANT CHANGE UP
SPECIMEN SOURCE: SIGNIFICANT CHANGE UP
SPHEROCYTES BLD QL SMEAR: SLIGHT — SIGNIFICANT CHANGE UP
VARIANT LYMPHS # BLD: 6.9 % — HIGH (ref 0–6)
WBC # BLD: 5.68 K/UL — SIGNIFICANT CHANGE UP (ref 3.8–10.5)
WBC # FLD AUTO: 5.68 K/UL — SIGNIFICANT CHANGE UP (ref 3.8–10.5)

## 2021-02-18 PROCEDURE — 99232 SBSQ HOSP IP/OBS MODERATE 35: CPT | Mod: CS,GC

## 2021-02-18 RX ORDER — PIPERACILLIN AND TAZOBACTAM 4; .5 G/20ML; G/20ML
4.5 INJECTION, POWDER, LYOPHILIZED, FOR SOLUTION INTRAVENOUS ONCE
Refills: 0 | Status: COMPLETED | OUTPATIENT
Start: 2021-02-18 | End: 2021-02-18

## 2021-02-18 RX ORDER — PIPERACILLIN AND TAZOBACTAM 4; .5 G/20ML; G/20ML
4.5 INJECTION, POWDER, LYOPHILIZED, FOR SOLUTION INTRAVENOUS EVERY 12 HOURS
Refills: 0 | Status: COMPLETED | OUTPATIENT
Start: 2021-02-18 | End: 2021-02-19

## 2021-02-18 RX ORDER — PRIMIDONE 250 MG/1
50 TABLET ORAL DAILY
Refills: 0 | Status: DISCONTINUED | OUTPATIENT
Start: 2021-02-18 | End: 2021-02-23

## 2021-02-18 RX ADMIN — PANTOPRAZOLE SODIUM 40 MILLIGRAM(S): 20 TABLET, DELAYED RELEASE ORAL at 06:36

## 2021-02-18 RX ADMIN — APIXABAN 2.5 MILLIGRAM(S): 2.5 TABLET, FILM COATED ORAL at 11:39

## 2021-02-18 RX ADMIN — Medication 1 TABLET(S): at 11:39

## 2021-02-18 RX ADMIN — APIXABAN 2.5 MILLIGRAM(S): 2.5 TABLET, FILM COATED ORAL at 00:16

## 2021-02-18 RX ADMIN — ZINC SULFATE TAB 220 MG (50 MG ZINC EQUIVALENT) 220 MILLIGRAM(S): 220 (50 ZN) TAB at 11:39

## 2021-02-18 RX ADMIN — Medication 6 MILLIGRAM(S): at 23:28

## 2021-02-18 RX ADMIN — Medication 50 MILLIGRAM(S): at 17:17

## 2021-02-18 RX ADMIN — Medication 6 MILLIGRAM(S): at 00:16

## 2021-02-18 RX ADMIN — PRIMIDONE 50 MILLIGRAM(S): 250 TABLET ORAL at 11:39

## 2021-02-18 RX ADMIN — PIPERACILLIN AND TAZOBACTAM 200 GRAM(S): 4; .5 INJECTION, POWDER, LYOPHILIZED, FOR SOLUTION INTRAVENOUS at 18:25

## 2021-02-18 RX ADMIN — PIPERACILLIN AND TAZOBACTAM 200 GRAM(S): 4; .5 INJECTION, POWDER, LYOPHILIZED, FOR SOLUTION INTRAVENOUS at 00:16

## 2021-02-18 RX ADMIN — APIXABAN 2.5 MILLIGRAM(S): 2.5 TABLET, FILM COATED ORAL at 23:25

## 2021-02-18 RX ADMIN — Medication 1 MILLIGRAM(S): at 11:39

## 2021-02-18 RX ADMIN — CHLORHEXIDINE GLUCONATE 1 APPLICATION(S): 213 SOLUTION TOPICAL at 06:35

## 2021-02-18 NOTE — PROGRESS NOTE ADULT - PROBLEM SELECTOR PLAN 1
COVID PCR positive (2/13/21), Decadron started 2/15 for 10 days.   -CXR w/ diffuse infiltrates b/l  -O2 requirements: 95% on 2L nc  -monitor inflammotory markers  - not eligible for remdesivir based on O2sat and BERTRAND.   - Zosyn d/c'd as concern for CAP is minimal and pt is having loose BMs. COVID PCR positive (2/13/21), Decadron started 2/15 for 10 days.   -CXR w/ diffuse infiltrates b/l  -O2 requirements: 95% on 2L nc  -monitor inflammotory markers  - not eligible for remdesivir based on O2sat and BERTRAND.

## 2021-02-18 NOTE — PROGRESS NOTE ADULT - PROBLEM SELECTOR PLAN 3
Pt reports hx of renal failure with baseline GFR ~30. Follows with Dr. Jamil Tee (nephrology) and Dr. Ishaan Gaming (urology). On admission, found to have Cr 2.93 in setting of decreased PO intake, fevers, covid-19. Cr function did not improve with fluid boluses. CK 1585. likely intrinsic process possibly 2/2 COVID vs rhabdomyolysis. Previously was retaining and had condon, no longer retaining or with condon.   -Monitor with daily BMP, CK,   -Avoid nephrotoxic medications.  - Likely at new baseline for kidney function .

## 2021-02-18 NOTE — PROGRESS NOTE ADULT - PROBLEM SELECTOR PLAN 2
-Febrile to 102 in ED, w/ significantly elevated procal 67.29.  -CXR w/o focal consolidation, though crackles bilaterally. S/p ceftriaxone 1g, azithromycin 500mg in ED  -D/c'd zosyn as low suspicion for HAP at this time.  -f/u repeat BCx 2/15 -> ngtd  -f/u MRSA swab negative (2/15) -Febrile to 102 in ED, w/ significantly elevated procal 67.29.  -CXR w/o focal consolidation, though crackles bilaterally. S/p ceftriaxone 1g, azithromycin 500mg in ED  -Received zosyn for 4 days (2/17- ) given that significant improvement was seen will continue treatment to a total of 5 days and will d/c  -f/u MRSA swab negative (2/15)

## 2021-02-18 NOTE — PROGRESS NOTE ADULT - ASSESSMENT
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 Transferred to 2WO due to worsening sepsis then stepped up to 3WO 2/15 for AFib RVR, has been stable on 2WO since stepdown and is now stable for transfer to [facility].

## 2021-02-18 NOTE — PROGRESS NOTE ADULT - PROBLEM SELECTOR PLAN 8
Has hx of PE, per daughter ~5yrs ago. First time provoked after hernia surgery, was on xarelto for a few months, then unprovoked in 2016 off xarelto after 3 months, then resumed Xarelto. Currently on xarelto 10mg qd for indefinite treatment of PE. No hx of malignancy, immobilization, or other risk factors, likely unprovoked. xarelto contraindicated in BERTRAND.   - hep gtt -> switched to eliquis 2.5 BID 2/16  - echo with normal RV fxn but increased in size.

## 2021-02-18 NOTE — PROGRESS NOTE ADULT - ATTENDING COMMENTS
Covid 19 hypoxic respiratory failure d#6, CKD, bl. PE on AC, tx to tele for worsening resp. failure in setting of af with rvr.  Respiratory status is stable, on 2lpm NC.  Continue ac for PE and af, on metoprolol for rate control.   Will start primidone for essential tremor.  Physical therapy, stable for tx to regional floor.

## 2021-02-18 NOTE — PROGRESS NOTE ADULT - PROBLEM SELECTOR PLAN 4
No known hx AFib. Had 16sec of PAT with HR 150s around 2am 2/15. Also noted to have rapid afib in HR 150s. S/p amio IV and on lopressor 25 BID.  Pt persistently tachycardic 120s-130s.  - d/c PO amiodarone prior to 10g bolus completion   - c/w metoprolol 50 BID for rate control.   - Cardiology consult, reccs appreciated.   - s/p 60mg IV lasix yesterday x1.     #Prolonged QTc  QTc 502 on admission, no hx of QTc-prolonging medications  -careful with QTc prolonging meds.

## 2021-02-18 NOTE — PROGRESS NOTE ADULT - PROBLEM SELECTOR PLAN 1
COVID PCR positive (2/13/21), Decadron started 2/15 for 10 days.   -CXR w/ diffuse infiltrates b/l  -O2 requirements: 95% on 2L nc  -monitor inflammotory markers  - not eligible for remdesivir based on O2sat and BERTRAND.   - Zosyn d/c'd as concern for CAP is minimal and pt is having loose BMs. no diarrhea/no melena/no vomiting

## 2021-02-18 NOTE — PROGRESS NOTE ADULT - SUBJECTIVE AND OBJECTIVE BOX
TRANSFER NOTE FROM 2WO TO [facility] SONYA OCONNELL, 85y, Male  MRN-[id number]  Patient is a 85y old  Male who presents with a chief complaint of alvin (17 Feb 2021 14:23)      OVERNIGHT EVENTS:     SUBJECTIVE:  -------------------------------------------------------------------------------  VITAL SIGNS:  Vital Signs Last 24 Hrs  T(C): 36 (18 Feb 2021 10:32), Max: 36.9 (17 Feb 2021 22:18)  T(F): 96.8 (18 Feb 2021 10:32), Max: 98.4 (17 Feb 2021 22:18)  HR: 72 (18 Feb 2021 09:06) (54 - 72)  BP: 107/78 (18 Feb 2021 09:06) (107/78 - 157/85)  BP(mean): 86 (18 Feb 2021 09:06) (86 - 127)  RR: 20 (18 Feb 2021 09:06) (18 - 23)  SpO2: 95% (18 Feb 2021 09:06) (93% - 96%)      I&O's Summary    17 Feb 2021 07:01  -  18 Feb 2021 07:00  --------------------------------------------------------  IN: 1380 mL / OUT: 4850 mL / NET: -3470 mL    18 Feb 2021 07:01  -  18 Feb 2021 11:06  --------------------------------------------------------  IN: 0 mL / OUT: 600 mL / NET: -600 mL        PHYSICAL EXAM:    General: NAD, well developed  HEENT: NC/AT; EOMI, PERRLA, anicteric sclera; moist mucosal membranes.  Neck: supple, trachea midline  Cardiovascular: RRR, +S1/S2; NO M/R/G  Respiratory: CTA B/L; no W/R/R  Gastrointestinal: soft, NT/ND; +BSx4  Extremities: WWP; no edema or cyanosis  Vascular: 2+ radial, DP/PT pulses B/L  Neurological: AAOx3; no focal deficits    ALLERGIES:  Allergies    No Known Allergies    Intolerances        MEDICATIONS:  MEDICATIONS  (STANDING):  apixaban 2.5 milliGRAM(s) Oral every 12 hours  chlorhexidine 2% Cloths 1 Application(s) Topical <User Schedule>  dexAMETHasone  Injectable 6 milliGRAM(s) IV Push every 24 hours  folic acid 1 milliGRAM(s) Oral daily  metoprolol tartrate 50 milliGRAM(s) Oral every 12 hours  multivitamin 1 Tablet(s) Oral daily  pantoprazole    Tablet 40 milliGRAM(s) Oral before breakfast  primidone 50 milliGRAM(s) Oral daily  zinc sulfate 220 milliGRAM(s) Oral daily    MEDICATIONS  (PRN):  acetaminophen   Tablet .. 650 milliGRAM(s) Oral every 4 hours PRN Temp greater or equal to 38C (100.4F), Mild Pain (1 - 3)  LORazepam   Injectable 1 milliGRAM(s) IV Push every 1 hour PRN CIWA-Ar score 8 or greater      -------------------------------------------------------------------------------  LABS:                        13.9   5.68  )-----------( 199      ( 18 Feb 2021 07:22 )             42.0     02-18    137  |  100  |  68<H>  ----------------------------<  139<H>  4.0   |  22  |  2.73<H>    Ca    8.6      18 Feb 2021 07:22  Phos  4.7     02-18  Mg     2.5     02-18    TPro  6.7  /  Alb  2.8<L>  /  TBili  1.2  /  DBili  x   /  AST  394<H>  /  ALT  211<H>  /  AlkPhos  432<H>  02-18    LIVER FUNCTIONS - ( 18 Feb 2021 07:22 )  Alb: 2.8 g/dL / Pro: 6.7 g/dL / ALK PHOS: 432 U/L / ALT: 211 U/L / AST: 394 U/L / GGT: x           PTT - ( 16 Feb 2021 20:23 )  PTT:29.1 sec    Lactate, Blood: 1.7 mmol/L (02-15 @ 00:20)          CAPILLARY BLOOD GLUCOSE              RADIOLOGY & ADDITIONAL TESTS: Reviewed.   TRANSFER NOTE FROM [facility] TO [facility] SONYA OCONNELL, 85y, Male  MRN-[id number]  Patient is a 85y old  Male who presents with a chief complaint of bertrand (17 Feb 2021 14:23)  HOSPITAL COURSE: 85M PMH of b/l PE (provoked, 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. Not given remdesivir for BERTRAND on CKD but decadron started 2/15 for x10 doses.  On the evening of 2/14, rapid response called for fever to 106F, hypoxia requiring 2L NC and tachypnea. Patient hyperthermic but awake, alert and oriented to baseline, and was protecting his airway. Was given additional 650mg Tylenol suppository, active cooling, started on Zosyn for broad coverage. He was then transferred to 2WO for worsening sepsis, and stepped up to 3WO 2/15 for AFib RVR, hypotension, and worsening sepsis. On 3WO his HR was controlled with amio gtt trasitioned to PO and metoprolol tartrate 25 BID. He was weaned off phenylephrine gtt for hypotension and AC transitioned to apixaban. Pt was stepped down to 2WO where cards was consulted for his afib hx, he was removed from amiodarone and metoprolol was increased to 50 mg BID. Pt now complaining of diarrhea, Zosyn d/c'd as no longer suspicion of HAP, and LFTs elevated so hep panel ordered for AM. The patient was weaned down to 2L NC and is safe and stable for floor transfer.     OVERNIGHT EVENTS: 2L NC overnight and stable.    SUBJECTIVE: Pt seen and examined at bedside this AM. Complaining of diarrhea and that his hand tremor is back to baseline prior to admission. However is still debilitating and he has a hard time completing ADLs with tremor  -------------------------------------------------------------------------------  VITAL SIGNS:  Vital Signs Last 24 Hrs  T(C): 36 (18 Feb 2021 10:32), Max: 36.9 (17 Feb 2021 22:18)  T(F): 96.8 (18 Feb 2021 10:32), Max: 98.4 (17 Feb 2021 22:18)  HR: 72 (18 Feb 2021 09:06) (54 - 72)  BP: 107/78 (18 Feb 2021 09:06) (107/78 - 157/85)  BP(mean): 86 (18 Feb 2021 09:06) (86 - 127)  RR: 20 (18 Feb 2021 09:06) (18 - 23)  SpO2: 95% (18 Feb 2021 09:06) (93% - 96%)      I&O's Summary    17 Feb 2021 07:01  -  18 Feb 2021 07:00  --------------------------------------------------------  IN: 1380 mL / OUT: 4850 mL / NET: -3470 mL    18 Feb 2021 07:01  -  18 Feb 2021 11:06  --------------------------------------------------------  IN: 0 mL / OUT: 600 mL / NET: -600 mL        PHYSICAL EXAM:    General: NAD, well developed  HEENT: NC/AT; EOMI, PERRLA, anicteric sclera; moist mucosal membranes.  Neck: supple, trachea midline  Cardiovascular: RRR, +S1/S2; NO M/R/G  Respiratory: CTA B/L; no W/R/R  Gastrointestinal: soft, NT/ND; +BSx4  Extremities: WWP; no edema or cyanosis  Vascular: 2+ radial, DP/PT pulses B/L  Neurological: AAOx3; no focal deficits    ALLERGIES:  Allergies    No Known Allergies    Intolerances        MEDICATIONS:  MEDICATIONS  (STANDING):  apixaban 2.5 milliGRAM(s) Oral every 12 hours  chlorhexidine 2% Cloths 1 Application(s) Topical <User Schedule>  dexAMETHasone  Injectable 6 milliGRAM(s) IV Push every 24 hours  folic acid 1 milliGRAM(s) Oral daily  metoprolol tartrate 50 milliGRAM(s) Oral every 12 hours  multivitamin 1 Tablet(s) Oral daily  pantoprazole    Tablet 40 milliGRAM(s) Oral before breakfast  primidone 50 milliGRAM(s) Oral daily  zinc sulfate 220 milliGRAM(s) Oral daily    MEDICATIONS  (PRN):  acetaminophen   Tablet .. 650 milliGRAM(s) Oral every 4 hours PRN Temp greater or equal to 38C (100.4F), Mild Pain (1 - 3)  LORazepam   Injectable 1 milliGRAM(s) IV Push every 1 hour PRN CIWA-Ar score 8 or greater      -------------------------------------------------------------------------------  LABS:                        13.9   5.68  )-----------( 199      ( 18 Feb 2021 07:22 )             42.0     02-18    137  |  100  |  68<H>  ----------------------------<  139<H>  4.0   |  22  |  2.73<H>    Ca    8.6      18 Feb 2021 07:22  Phos  4.7     02-18  Mg     2.5     02-18    TPro  6.7  /  Alb  2.8<L>  /  TBili  1.2  /  DBili  x   /  AST  394<H>  /  ALT  211<H>  /  AlkPhos  432<H>  02-18    LIVER FUNCTIONS - ( 18 Feb 2021 07:22 )  Alb: 2.8 g/dL / Pro: 6.7 g/dL / ALK PHOS: 432 U/L / ALT: 211 U/L / AST: 394 U/L / GGT: x           PTT - ( 16 Feb 2021 20:23 )  PTT:29.1 sec    Lactate, Blood: 1.7 mmol/L (02-15 @ 00:20)          CAPILLARY BLOOD GLUCOSE              RADIOLOGY & ADDITIONAL TESTS: Reviewed.

## 2021-02-18 NOTE — PROGRESS NOTE ADULT - PROBLEM SELECTOR PLAN 2
-Febrile to 102 in ED, w/ significantly elevated procal 67.29.  -CXR w/o focal consolidation, though crackles bilaterally. S/p ceftriaxone 1g, azithromycin 500mg in ED  -D/c'd zosyn as low suspicion for HAP at this time.  -f/u repeat BCx 2/15 -> ngtd  -f/u MRSA swab negative (2/15)

## 2021-02-18 NOTE — PROGRESS NOTE ADULT - PROBLEM SELECTOR PLAN 6
Home medication: losartan 50mg qd  -continue holding in setting of BERTRAND       #Elevated trop 0.02  No chest pain, no ischemic changes on EKG. Peaked on 2/15 at .05  -Likely demand 2/2 covid-19, poor clearance from BERTRAND-on-CKD  -If c/o of chest pain, stat EKG and repeat cardiac enzymes

## 2021-02-18 NOTE — PROGRESS NOTE ADULT - SUBJECTIVE AND OBJECTIVE BOX
Patient is a 85y old  Male who presents with a chief complaint of bertrand (17 Feb 2021 14:23)  HOSPITAL COURSE: 85M PMH of b/l PE (provoked, 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 ED 2/13 admitted for [facility] 2/2 COVID PNA. Not given remdesivir for BERTRAND on CKD but decadron started 2/15 for x10 doses.  On the evening of 2/14, rapid response called for fever to 106F, hypoxia requiring 2L NC and tachypnea. Patient hyperthermic but awake, alert and oriented to baseline, and was protecting his airway. Was given additional 650mg Tylenol suppository, active cooling, started on Zosyn for broad coverage. He was then transferred to 2WO for worsening sepsis, and stepped up to 3WO 2/15 for AFib RVR, hypotension, and worsening sepsis. On 3WO his HR was controlled with amio gtt trasitioned to PO and metoprolol tartrate 25 BID. He was weaned off phenylephrine gtt for hypotension and AC transitioned to apixaban. Pt was stepped down to 2WO where cards was consulted for his afib hx, he was removed from amiodarone and metoprolol was increased to 50 mg BID. Pt now complaining of diarrhea, Zosyn d/c'd as no longer suspicion of HAP, and LFTs elevated so hep panel ordered for AM. The patient was weaned down to 2L NC and is safe and stable for floor transfer.     [facility] pleasant to converse with and eager to return home but agrees to collaborate with the team on PT and probable [facility] placement. Satting comfortably on 2 L NC and states his b/l hand tremor is close to baseline.      On further ROS, patient did not have complaint of: Headache, Blurred Vision, Cough, Dyspnea, Palpitations, Abdominal Pain, N/V, Weakness, Change in Sensation.     VITAL SIGNS:  T(F): 97.5 (02-18-21 @ 14:48)  HR: 71 (02-18-21 @ 14:48)  BP: 154/97 (02-18-21 @ 14:48)  RR: 20 (02-18-21 @ 14:48)  SpO2: 95% (02-18-21 @ 14:48)  Wt(kg): --    Input & Output:    02-17-21 @ 07:01  -  02-18-21 @ 07:00  --------------------------------------------------------  IN: 1380 mL / OUT: 4850 mL / NET: -3470 mL    02-18-21 @ 07:01  -  02-18-21 @ 15:46  --------------------------------------------------------  IN: 0 mL / OUT: 900 mL / NET: -900 mL        PHYSICAL EXAM:    General: NAD, well developed  HEENT: NC/AT; EOMI, PERRLA, anicteric sclera; moist mucosal membranes.  Neck: supple, trachea midline  Cardiovascular: RRR, +S1/S2; NO M/R/G  Respiratory: CTA B/L; no W/R/R  Gastrointestinal: soft, NT/ND; +BSx4  Extremities: WWP; no edema or cyanosis  Vascular: 2+ radial, DP/PT pulses B/L  Neurological: AAOx3; no focal deficits    MEDICATIONS  (STANDING):  apixaban 2.5 milliGRAM(s) Oral every 12 hours  chlorhexidine 2% Cloths 1 Application(s) Topical <User Schedule>  dexAMETHasone  Injectable 6 milliGRAM(s) IV Push every 24 hours  folic acid 1 milliGRAM(s) Oral daily  metoprolol tartrate 50 milliGRAM(s) Oral every 12 hours  multivitamin 1 Tablet(s) Oral daily  pantoprazole    Tablet 40 milliGRAM(s) Oral before breakfast  primidone 50 milliGRAM(s) Oral daily  zinc sulfate 220 milliGRAM(s) Oral daily    MEDICATIONS  (PRN):  acetaminophen   Tablet .. 650 milliGRAM(s) Oral every 4 hours PRN Temp greater or equal to 38C (100.4F), Mild Pain (1 - 3)  LORazepam   Injectable 1 milliGRAM(s) IV Push every 1 hour PRN CIWA-Ar score 8 or greater      Allergies    No Known Allergies    Intolerances        LABS:                        13.9   5.68  )-----------( 199      ( 18 Feb 2021 07:22 )             42.0     02-18    137  |  100  |  68<H>  ----------------------------<  139<H>  4.0   |  22  |  2.73<H>    Ca    8.6      18 Feb 2021 07:22  Phos  4.7     02-18  Mg     2.5     02-18    TPro  6.7  /  Alb  2.8<L>  /  TBili  1.2  /  DBili  x   /  AST  394<H>  /  ALT  211<H>  /  AlkPhos  432<H>  02-18    PTT - ( 16 Feb 2021 20:23 )  PTT:29.1 sec      RADIOLOGY & ADDITIONAL TESTS:

## 2021-02-18 NOTE — PROGRESS NOTE ADULT - PROBLEM SELECTOR PLAN 5
Essential tremor. Plan: Tremor for >10yrs, worse with intention. Self-medicates with vodka, he takes 2 shots of vodka every morning one martini at night with improvement of his symptoms. There has been no increase in the amount of vodka he drinks during acute worsening of tremor. Worsening likely 2/2 acute infection and dehydration.   -Pt independent in ADLs at baseline, however daughter requesting assessment for home health aid after discharge.  -F/u social work consult.  -starting ativan 1mg q8 prn 2/16 for treatment of tremors, appears to help. Will keep on   - Added primidone as well for tremor.   - PT ordered.    #Alcohol use  Drinks as above. CIWAs max 8, previously.  -Ativan 1mg PRN for CIWA >8  -CIWA protocol, monitor for signs of alcohol withdrawal. Has not had elevated CIWAs on 2WO. Receiving ativan only for tremor.

## 2021-02-18 NOTE — PROGRESS NOTE ADULT - ASSESSMENT
85M PMH b/l PE (on xarelto), mitral valve prolapse, and CKD (reports baseline GFR 30) who presented to [facility] ED c/o 5d of NBNB diarrhea, dehydration, weakness, myalgias, dry cough, and worsening of his baseline tremor, and intermittent fevers (Tmax 102F at home), found to be COVID-19+, BERTRAND on CKD. Transferred to 2WO due to worsening sepsis then stepped up to 3WO 2/15 for AFib RVR, has been stable on 2WO since stepdown and is now stable for transfer to [facility].

## 2021-02-18 NOTE — PROGRESS NOTE ADULT - PROBLEM SELECTOR PLAN 10
F: encourage PO  E: replete to K>4, Mg>2, Phos>2.5  N: regular diet  Ppx: apixaban 2.5mg  Code Status: full code  Dispo: covid-tele.

## 2021-02-19 ENCOUNTER — TRANSCRIPTION ENCOUNTER (OUTPATIENT)
Age: 86
End: 2021-02-19

## 2021-02-19 DIAGNOSIS — R79.89 OTHER SPECIFIED ABNORMAL FINDINGS OF BLOOD CHEMISTRY: ICD-10-CM

## 2021-02-19 DIAGNOSIS — R74.01 ELEVATION OF LEVELS OF LIVER TRANSAMINASE LEVELS: ICD-10-CM

## 2021-02-19 LAB
ALBUMIN SERPL ELPH-MCNC: 3.1 G/DL — LOW (ref 3.3–5)
ALP SERPL-CCNC: 558 U/L — HIGH (ref 40–120)
ALT FLD-CCNC: 345 U/L — HIGH (ref 10–45)
ANION GAP SERPL CALC-SCNC: 15 MMOL/L — SIGNIFICANT CHANGE UP (ref 5–17)
AST SERPL-CCNC: 373 U/L — HIGH (ref 10–40)
BASOPHILS # BLD AUTO: 0 K/UL — SIGNIFICANT CHANGE UP (ref 0–0.2)
BASOPHILS NFR BLD AUTO: 0 % — SIGNIFICANT CHANGE UP (ref 0–2)
BILIRUB SERPL-MCNC: 1 MG/DL — SIGNIFICANT CHANGE UP (ref 0.2–1.2)
BUN SERPL-MCNC: 73 MG/DL — HIGH (ref 7–23)
CALCIUM SERPL-MCNC: 8.8 MG/DL — SIGNIFICANT CHANGE UP (ref 8.4–10.5)
CHLORIDE SERPL-SCNC: 101 MMOL/L — SIGNIFICANT CHANGE UP (ref 96–108)
CO2 SERPL-SCNC: 22 MMOL/L — SIGNIFICANT CHANGE UP (ref 22–31)
CREAT SERPL-MCNC: 2.74 MG/DL — HIGH (ref 0.5–1.3)
CRP SERPL-MCNC: 7.79 MG/DL — HIGH (ref 0–0.4)
D DIMER BLD IA.RAPID-MCNC: 2570 NG/ML DDU — HIGH
EOSINOPHIL # BLD AUTO: 0 K/UL — SIGNIFICANT CHANGE UP (ref 0–0.5)
EOSINOPHIL NFR BLD AUTO: 0 % — SIGNIFICANT CHANGE UP (ref 0–6)
FERRITIN SERPL-MCNC: 9765 NG/ML — HIGH (ref 30–400)
GIANT PLATELETS BLD QL SMEAR: PRESENT — SIGNIFICANT CHANGE UP
GLUCOSE SERPL-MCNC: 146 MG/DL — HIGH (ref 70–99)
HAV IGM SER-ACNC: SIGNIFICANT CHANGE UP
HBV CORE AB SER-ACNC: SIGNIFICANT CHANGE UP
HBV CORE IGM SER-ACNC: SIGNIFICANT CHANGE UP
HBV SURFACE AB SER-ACNC: SIGNIFICANT CHANGE UP
HBV SURFACE AG SER-ACNC: SIGNIFICANT CHANGE UP
HCT VFR BLD CALC: 43.4 % — SIGNIFICANT CHANGE UP (ref 39–50)
HCV AB S/CO SERPL IA: 0.12 S/CO — SIGNIFICANT CHANGE UP
HCV AB SERPL-IMP: SIGNIFICANT CHANGE UP
HGB BLD-MCNC: 14.5 G/DL — SIGNIFICANT CHANGE UP (ref 13–17)
LYMPHOCYTES # BLD AUTO: 0.24 K/UL — LOW (ref 1–3.3)
LYMPHOCYTES # BLD AUTO: 3.5 % — LOW (ref 13–44)
MAGNESIUM SERPL-MCNC: 2.7 MG/DL — HIGH (ref 1.6–2.6)
MANUAL SMEAR VERIFICATION: SIGNIFICANT CHANGE UP
MCHC RBC-ENTMCNC: 31.8 PG — SIGNIFICANT CHANGE UP (ref 27–34)
MCHC RBC-ENTMCNC: 33.4 GM/DL — SIGNIFICANT CHANGE UP (ref 32–36)
MCV RBC AUTO: 95.2 FL — SIGNIFICANT CHANGE UP (ref 80–100)
MONOCYTES # BLD AUTO: 0.24 K/UL — SIGNIFICANT CHANGE UP (ref 0–0.9)
MONOCYTES NFR BLD AUTO: 3.5 % — SIGNIFICANT CHANGE UP (ref 2–14)
NEUTROPHILS # BLD AUTO: 6.17 K/UL — SIGNIFICANT CHANGE UP (ref 1.8–7.4)
NEUTROPHILS NFR BLD AUTO: 87.7 % — HIGH (ref 43–77)
NEUTS BAND # BLD: 0.9 % — SIGNIFICANT CHANGE UP (ref 0–8)
OVALOCYTES BLD QL SMEAR: SLIGHT — SIGNIFICANT CHANGE UP
PHOSPHATE SERPL-MCNC: 5 MG/DL — HIGH (ref 2.5–4.5)
PLAT MORPH BLD: NORMAL — SIGNIFICANT CHANGE UP
PLATELET # BLD AUTO: 288 K/UL — SIGNIFICANT CHANGE UP (ref 150–400)
POTASSIUM SERPL-MCNC: 4.5 MMOL/L — SIGNIFICANT CHANGE UP (ref 3.5–5.3)
POTASSIUM SERPL-SCNC: 4.5 MMOL/L — SIGNIFICANT CHANGE UP (ref 3.5–5.3)
PROCALCITONIN SERPL-MCNC: 2.63 NG/ML — HIGH (ref 0.02–0.1)
PROT SERPL-MCNC: 7.2 G/DL — SIGNIFICANT CHANGE UP (ref 6–8.3)
RBC # BLD: 4.56 M/UL — SIGNIFICANT CHANGE UP (ref 4.2–5.8)
RBC # FLD: 14 % — SIGNIFICANT CHANGE UP (ref 10.3–14.5)
RBC BLD AUTO: ABNORMAL
SODIUM SERPL-SCNC: 138 MMOL/L — SIGNIFICANT CHANGE UP (ref 135–145)
TROPONIN T SERPL-MCNC: <0.01 NG/ML — SIGNIFICANT CHANGE UP (ref 0–0.01)
VARIANT LYMPHS # BLD: 4.4 % — SIGNIFICANT CHANGE UP (ref 0–6)
WBC # BLD: 6.96 K/UL — SIGNIFICANT CHANGE UP (ref 3.8–10.5)
WBC # FLD AUTO: 6.96 K/UL — SIGNIFICANT CHANGE UP (ref 3.8–10.5)

## 2021-02-19 PROCEDURE — 99233 SBSQ HOSP IP/OBS HIGH 50: CPT | Mod: CS,GC

## 2021-02-19 PROCEDURE — 71045 X-RAY EXAM CHEST 1 VIEW: CPT | Mod: 26

## 2021-02-19 PROCEDURE — 93010 ELECTROCARDIOGRAM REPORT: CPT

## 2021-02-19 PROCEDURE — 74176 CT ABD & PELVIS W/O CONTRAST: CPT | Mod: 26

## 2021-02-19 RX ADMIN — Medication 1 TABLET(S): at 11:29

## 2021-02-19 RX ADMIN — Medication 1 MILLIGRAM(S): at 11:29

## 2021-02-19 RX ADMIN — APIXABAN 2.5 MILLIGRAM(S): 2.5 TABLET, FILM COATED ORAL at 22:49

## 2021-02-19 RX ADMIN — Medication 50 MILLIGRAM(S): at 18:39

## 2021-02-19 RX ADMIN — PIPERACILLIN AND TAZOBACTAM 200 GRAM(S): 4; .5 INJECTION, POWDER, LYOPHILIZED, FOR SOLUTION INTRAVENOUS at 18:39

## 2021-02-19 RX ADMIN — CHLORHEXIDINE GLUCONATE 1 APPLICATION(S): 213 SOLUTION TOPICAL at 06:21

## 2021-02-19 RX ADMIN — PIPERACILLIN AND TAZOBACTAM 200 GRAM(S): 4; .5 INJECTION, POWDER, LYOPHILIZED, FOR SOLUTION INTRAVENOUS at 06:21

## 2021-02-19 RX ADMIN — Medication 50 MILLIGRAM(S): at 06:52

## 2021-02-19 RX ADMIN — APIXABAN 2.5 MILLIGRAM(S): 2.5 TABLET, FILM COATED ORAL at 11:29

## 2021-02-19 RX ADMIN — PRIMIDONE 50 MILLIGRAM(S): 250 TABLET ORAL at 11:29

## 2021-02-19 RX ADMIN — ZINC SULFATE TAB 220 MG (50 MG ZINC EQUIVALENT) 220 MILLIGRAM(S): 220 (50 ZN) TAB at 11:29

## 2021-02-19 RX ADMIN — PANTOPRAZOLE SODIUM 40 MILLIGRAM(S): 20 TABLET, DELAYED RELEASE ORAL at 06:21

## 2021-02-19 NOTE — PROGRESS NOTE ADULT - PROBLEM SELECTOR PLAN 3
Pt with elevated d-dimer today 2570. Pt w/out calf tenderness, no edema of the extremities, no chest pain. Currently on Eliquis 2.5 BID  - continue to trend

## 2021-02-19 NOTE — DISCHARGE NOTE PROVIDER - PROVIDER TOKENS
PROVIDER:[TOKEN:[[id number]],FOLLOWUP:[2 weeks]] PROVIDER:[TOKEN:[[id number]],FOLLOWUP:[2 weeks]],PROVIDER:[TOKEN:[[id number]],FOLLOWUP:[2 weeks],ESTABLISHEDPATIENT:[T]]

## 2021-02-19 NOTE — PROGRESS NOTE ADULT - PROBLEM SELECTOR PLAN 4
Pt w/ elevated ALT/AST and Alk Phos this AM, Hep panel negative, CT abdomen ordered for source control/evaluation  - f/u CT Abdomen

## 2021-02-19 NOTE — DISCHARGE NOTE PROVIDER - NSDCCPCAREPLAN_GEN_ALL_CORE_FT
PRINCIPAL DISCHARGE DIAGNOSIS  Diagnosis: Pneumonia due to COVID-19 virus  Assessment and Plan of Treatment: You had signs and symptoms concerning for COVID-19. You tested postive. You were treated with the medications Decadron and Remdesivir while you were in the hospital. You have been clinically stable and deemed safe for discharge to continue your treatment course and quarantine. While you are at home you should stay in your own room whenever possible and wear a mask as much as possible. Please be sure to self-quarantine at home until 14 days have passed since your first positive COVID test from this admission. You tested positive on 2/13 so please self-quarantine until 2/27.   If you have to be in the same room as someone else, you should both wear a mask. If you share a restroom, you should wipe it down with bleach wipes between each use. Please continue to practice social distancing and good hand hygiene. You will also have a pulse oximeter device to help you monitor your oxygen levels and heart rate.  If you do experience worsening shortness of breath at home, start to experience new chest pain or new leg pain, please call your doctor and consider coming back.        SECONDARY DISCHARGE DIAGNOSES  Diagnosis: Essential tremor  Assessment and Plan of Treatment: Essential tremor    Diagnosis: Acute on chronic kidney failure  Assessment and Plan of Treatment: Acute on chronic kidney failure     PRINCIPAL DISCHARGE DIAGNOSIS  Diagnosis: Pneumonia due to COVID-19 virus  Assessment and Plan of Treatment: You had signs and symptoms concerning for COVID-19. You tested postive. You were treated with the medication Decadron while you were in the hospital, which will be continued for one more day after you are discharged. You have been clinically stable and deemed safe for discharge to continue your treatment course and quarantine. While you are at the subacute rehab you should stay in your own room whenever possible and wear a mask as much as possible. Please be sure to self-quarantine until 14 days have passed since your last positive COVID test from this admission. You tested positive on 2/23 so please self-quarantine through 3/9.   If you have to be in the same room as someone else, you should both wear a mask. If you share a restroom, you should wipe it down with bleach wipes between each use. Please continue to practice social distancing and good hand hygiene. You will also have a pulse oximeter device to help you monitor your oxygen levels and heart rate.  If you do experience worsening shortness of breath at home, start to experience new chest pain or new leg pain, please call your doctor and consider coming back.        SECONDARY DISCHARGE DIAGNOSES  Diagnosis: Acute on chronic kidney failure  Assessment and Plan of Treatment: You had an exacerbation of your known kidney disease called acute on chronic exacerbation, which was likely due to obstruction of urine flow and dehydration. This has resolved and you are being discharged at your normal level of kidney function. Please follow up with your primary care physician and nephrologist, Dr Gaming, after you are discharged.     PRINCIPAL DISCHARGE DIAGNOSIS  Diagnosis: Pneumonia due to COVID-19 virus  Assessment and Plan of Treatment: You had signs and symptoms concerning for COVID-19. You tested postive. You were treated with the medication Decadron while you were in the hospital, which will be continued for one more day after you are discharged. You have been clinically stable and deemed safe for discharge to continue your treatment course and quarantine. While you are at the subacute rehab you should stay in your own room whenever possible and wear a mask as much as possible. Please be sure to self-quarantine until 14 days have passed since your last positive COVID test from this admission. You tested positive on 2/23 so please self-quarantine through 3/9.   If you have to be in the same room as someone else, you should both wear a mask. If you share a restroom, you should wipe it down with bleach wipes between each use. Please continue to practice social distancing and good hand hygiene. You will also have a pulse oximeter device to help you monitor your oxygen levels and heart rate.  If you do experience worsening shortness of breath at home, start to experience new chest pain or new leg pain, please call your doctor and consider coming back.        SECONDARY DISCHARGE DIAGNOSES  Diagnosis: Acute on chronic kidney failure  Assessment and Plan of Treatment: You had an exacerbation of your known kidney disease called acute on chronic exacerbation, which was likely due to obstruction of urine flow and dehydration. This has resolved and you are being discharged at your normal level of kidney function. Please follow up with your primary care physician and nephrologist, Dr Gaming, after you are discharged.    Diagnosis: Afib  Assessment and Plan of Treatment: You had an irregular heart beat during your hospitalization known as atrial fibrillation, which puts you at risk to form blood clots. While you were already on a blood thinner outpatient, that medicaton was adjusted because of your kidney disease and is now appropriate to treat your history of pulmonary embolism, your risk of blood clots from COVID 19, and your risk of blood clots from AFib. It is therefore very important that you continue to take this medication, Eliquis, exactly as prescribed. You should take 2.5 mg twice a day, once in the morning and once at night. your blood pressure medication was also changed to accomodate your heart rate. You should take Lopressor 25 mg once in the morning and once at night.  You need to follow up with the cardiologist who saw you in the hospital, Dr Velarde, within the next 2 weeks. Please call to schedule an appointment, which will likely be via telehealth.

## 2021-02-19 NOTE — DISCHARGE NOTE PROVIDER - CARE PROVIDERS DIRECT ADDRESSES
,[email or web address]

## 2021-02-19 NOTE — PROGRESS NOTE ADULT - PROBLEM SELECTOR PLAN 1
COVID PCR positive (2/13/21), Decadron started 2/15 for 10 days.   -CXR w/ diffuse infiltrates b/l, moderately improved 2/19  -Decadron 2/15-2/24  -No remdesivir i/s/o decreased renal function  -O2 requirements: 95% on 2L nc  -monitor COVID markers

## 2021-02-19 NOTE — PROGRESS NOTE ADULT - SUBJECTIVE AND OBJECTIVE BOX
****INCOMPLETE, DO NOT COPY*****  INTERVAL HPI/OVERNIGHT EVENTS:  Pt bradycardic this AM, held lopressor. SUSI o/n and no complaints this AM, however on exam pt with crackles posterior    On further ROS, patient did not have complaint of: Headache, Blurred Vision, Cough, Dyspnea, Palpitations, Abdominal Pain, N/V, Weakness, Change in Sensation.     VITAL SIGNS:  T(F): 97.3 (02-19-21 @ 06:25)  HR: 66 (02-19-21 @ 06:25)  BP: 156/96 (02-19-21 @ 06:25)  RR: 18 (02-19-21 @ 06:25)  SpO2: 95% (02-19-21 @ 06:25)  Wt(kg): --    Input & Output:    02-18-21 @ 07:01  -  02-19-21 @ 07:00  --------------------------------------------------------  IN: 100 mL / OUT: 1350 mL / NET: -1250 mL        PHYSICAL EXAM:  General: Comfortable, pleasant/anxious/agitated, Ill-appearing, well-nourished/frail/cachectic, comfortable / in distress  Neurological: AAOx3, no focal deficits  HEENT: NC/AT; EOMI, PERRL, clear conjunctiva, no nasal or oropharyngeal discharge or exudates, MMM  Neck: supple, no cervical or post-auricular lymphadenopathy  Cardiovascular: +S1/S2, no murmurs/rubs/gallops, RRR  Respiratory: CTA B/L, no diminished breath sounds, no wheezes/rales/rhonchi, no increased work of breathing or accessory muscle use  Gastrointestinal: soft, NT/ND; active BSx4 quadrants  Genitourinary: no suprapubic tenderness, no CVA tenderness  Extremities: WWP; no edema, clubbing or cyanosis  Vascular: 2+ radial, DP/PT pulses B/L  Skin: no rashes  Lines/Drains:     MEDICATIONS  (STANDING):  apixaban 2.5 milliGRAM(s) Oral every 12 hours  chlorhexidine 2% Cloths 1 Application(s) Topical <User Schedule>  dexAMETHasone  Injectable 6 milliGRAM(s) IV Push every 24 hours  folic acid 1 milliGRAM(s) Oral daily  metoprolol tartrate 50 milliGRAM(s) Oral every 12 hours  multivitamin 1 Tablet(s) Oral daily  pantoprazole    Tablet 40 milliGRAM(s) Oral before breakfast  piperacillin/tazobactam IVPB.. 4.5 Gram(s) IV Intermittent every 12 hours  primidone 50 milliGRAM(s) Oral daily  zinc sulfate 220 milliGRAM(s) Oral daily    MEDICATIONS  (PRN):  acetaminophen   Tablet .. 650 milliGRAM(s) Oral every 4 hours PRN Temp greater or equal to 38C (100.4F), Mild Pain (1 - 3)  LORazepam   Injectable 1 milliGRAM(s) IV Push every 1 hour PRN CIWA-Ar score 8 or greater      Allergies    No Known Allergies    Intolerances        LABS:                        14.5   6.96  )-----------( 288      ( 19 Feb 2021 06:48 )             43.4     02-19    138  |  101  |  73<H>  ----------------------------<  146<H>  4.5   |  22  |  2.74<H>    Ca    8.8      19 Feb 2021 06:48  Phos  5.0     02-19  Mg     2.7     02-19    TPro  7.2  /  Alb  3.1<L>  /  TBili  1.0  /  DBili  x   /  AST  373<H>  /  ALT  345<H>  /  AlkPhos  558<H>  02-19          RADIOLOGY & ADDITIONAL TESTS:   INTERVAL HPI/OVERNIGHT EVENTS:  Pt bradycardic this AM, held lopressor. SUSI o/n and no complaints this AM, however on exam pt with new diffuse crackles posteriorly, no change in O2 requirements and CXR appears improved.   12 point review of systems negative.      VITAL SIGNS:  T(F): 97.3 (02-19-21 @ 06:25)  HR: 66 (02-19-21 @ 06:25)  BP: 156/96 (02-19-21 @ 06:25)  RR: 18 (02-19-21 @ 06:25)  SpO2: 95% (02-19-21 @ 06:25)  Wt(kg): --    Input & Output:    02-18-21 @ 07:01  -  02-19-21 @ 07:00  --------------------------------------------------------  IN: 100 mL / OUT: 1350 mL / NET: -1250 mL        PHYSICAL EXAM:    General: NAD, well developed  HEENT: NC/AT; EOMI, PERRLA, anicteric sclera; moist mucosal membranes.  Neck: supple, trachea midline  Cardiovascular: RRR, +S1/S2; NO M/R/G  Respiratory: Diffuse crackles posterior lung fields; no accessory muscle use or increased work of breathing  Gastrointestinal: soft, NT/ND; +BSx4  Extremities: WWP; no edema or cyanosis  Vascular: 2+ radial, DP/PT pulses B/L  Neurological: AAOx3; no focal deficits    MEDICATIONS  (STANDING):  apixaban 2.5 milliGRAM(s) Oral every 12 hours  chlorhexidine 2% Cloths 1 Application(s) Topical <User Schedule>  dexAMETHasone  Injectable 6 milliGRAM(s) IV Push every 24 hours  folic acid 1 milliGRAM(s) Oral daily  metoprolol tartrate 50 milliGRAM(s) Oral every 12 hours  multivitamin 1 Tablet(s) Oral daily  pantoprazole    Tablet 40 milliGRAM(s) Oral before breakfast  piperacillin/tazobactam IVPB.. 4.5 Gram(s) IV Intermittent every 12 hours  primidone 50 milliGRAM(s) Oral daily  zinc sulfate 220 milliGRAM(s) Oral daily    MEDICATIONS  (PRN):  acetaminophen   Tablet .. 650 milliGRAM(s) Oral every 4 hours PRN Temp greater or equal to 38C (100.4F), Mild Pain (1 - 3)  LORazepam   Injectable 1 milliGRAM(s) IV Push every 1 hour PRN CIWA-Ar score 8 or greater      Allergies    No Known Allergies    Intolerances        LABS:                        14.5   6.96  )-----------( 288      ( 19 Feb 2021 06:48 )             43.4     02-19    138  |  101  |  73<H>  ----------------------------<  146<H>  4.5   |  22  |  2.74<H>    Ca    8.8      19 Feb 2021 06:48  Phos  5.0     02-19  Mg     2.7     02-19    TPro  7.2  /  Alb  3.1<L>  /  TBili  1.0  /  DBili  x   /  AST  373<H>  /  ALT  345<H>  /  AlkPhos  558<H>  02-19          RADIOLOGY & ADDITIONAL TESTS:

## 2021-02-19 NOTE — PROGRESS NOTE ADULT - SUBJECTIVE AND OBJECTIVE BOX
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 ED c/o 5d of NBNB diarrhea, dehydration, weakness, myalgias, dry cough, and worsening of his baseline tremor. He has also had intermittent fevers with Tmax 102 at home. He denies recent travel or sick contacts. ROS negative for chest pain or palpitations, shortness of breath, abd pain.    He has had a baseline tremor for over a decade, worse with intention. He has been to neurologists for evaluation, no underlying etiology identified. He self-medicates with vodka, he takes 2 shots of vodka every morning one martini at night with improvement of his symptoms. There has been no increase in the amount of vodka he drinks during acute worsening of tremor. At baseline pt is independent in ADLS, lives alone. Per daughter, very dehydrated. Noticed that his tremors were worse in the AM when waking up.     In the ED,  Vitals: T98.1, HR 85, /64, RR 17, O2sat 95% on room air  Labs: Na 133, HCO3 18, BUN 54, Cr 2.93 | trop T 0.02, BNP 3014 | lactate 3.1    CRP 33.21, ferritin 1853 | procalcitonin 67.29    UA: pending    VBG: pH 7.36, pCO2 36  Imaging:    EKG: LAD, HR 85, bifasciular block (LAFB + RBBB)    CXR: diffuse infiltrates bilaterally  Interventions: ceftriaxone 1g, azithromycin 500mg, 1L IV NS. Repeat lactate after fluids 1.7.    (13 Feb 2021 20:25)                            14.5   6.96  )-----------( 288      ( 19 Feb 2021 06:48 )             43.4       02-19    138  |  101  |  73<H>  ----------------------------<  146<H>  4.5   |  22  |  2.74<H>    Ca    8.8      19 Feb 2021 06:48  Phos  5.0     02-19  Mg     2.7     02-19    TPro  7.2  /  Alb  3.1<L>  /  TBili  1.0  /  DBili  x   /  AST  373<H>  /  ALT  345<H>  /  AlkPhos  558<H>  02-19    Vital Signs Last 24 Hrs  T(C): 36.4 (19 Feb 2021 09:41), Max: 36.4 (18 Feb 2021 14:48)  T(F): 97.5 (19 Feb 2021 09:41), Max: 97.5 (18 Feb 2021 14:48)  HR: 62 (19 Feb 2021 10:22) (53 - 85)  BP: 160/97 (19 Feb 2021 09:41) (135/89 - 160/97)  BP(mean): 107 (18 Feb 2021 12:00) (107 - 107)  RR: 18 (19 Feb 2021 09:41) (18 - 20)  SpO2: 94% (19 Feb 2021 09:41) (87% - 96%)    MEDICATIONS  (STANDING):  apixaban 2.5 milliGRAM(s) Oral every 12 hours  chlorhexidine 2% Cloths 1 Application(s) Topical <User Schedule>  dexAMETHasone  Injectable 6 milliGRAM(s) IV Push every 24 hours  folic acid 1 milliGRAM(s) Oral daily  metoprolol tartrate 50 milliGRAM(s) Oral every 12 hours  multivitamin 1 Tablet(s) Oral daily  pantoprazole    Tablet 40 milliGRAM(s) Oral before breakfast  piperacillin/tazobactam IVPB.. 4.5 Gram(s) IV Intermittent every 12 hours  primidone 50 milliGRAM(s) Oral daily  zinc sulfate 220 milliGRAM(s) Oral daily    MEDICATIONS  (PRN):  acetaminophen   Tablet .. 650 milliGRAM(s) Oral every 4 hours PRN Temp greater or equal to 38C (100.4F), Mild Pain (1 - 3)  LORazepam   Injectable 1 milliGRAM(s) IV Push every 1 hour PRN CIWA-Ar score 8 or greater    Currently Undergoing Physical/ Occupational Therapy at bedside.    Functional Status Assessment:   2/18/2021      Therapeutic Interventions      Bed Mobility  Bed Mobility Training Bridging: supervision  Bed Mobility Training Sit-to-Supine: contact guard  Bed Mobility Training Supine-to-Sit: contact guard  Bed Mobility Training Limitations: impaired balance;  decreased strength    Sit-Stand Transfer Training  Transfer Training Sit-to-Stand Transfer: minimum assist (75% patient effort);  1 person assist;  full weight-bearing   rolling walker  Transfer Training Stand-to-Sit Transfer: contact guard;  full weight-bearing   rolling walker  Sit-to-Stand Transfer Training Transfer Safety Analysis: decreased strength;  impaired balance    Gait Training  Gait Training: minimum assist (75% patient effort);  1 person assist;  full weight-bearing   rolling walker;  18 ft x1  Gait Analysis: 2-point gait   decreased luma;  increased time in double stance;  decreased hip/knee flexion;  decreased velocity of limb motion;  decreased step length;  decreased stride length;  decreased swing-to-stance ratio;  decreased strength;  impaired balance    Therapeutic Exercise  Therapeutic Exercise Detail: Standing marching x20, RW, Min A MMT b/l KE 4+/5           PM&R Impression: as above    Current Disposition Plan Recommendations:    subacute rehab placement

## 2021-02-19 NOTE — DISCHARGE NOTE PROVIDER - HOSPITAL COURSE
***INCOMPLETE***  #Discharge: do not delete    85M w/ PMH of b/l PE (on xarelto), mitral valve prolapse, and CKD (reports baseline GFR 30) who presents to [facility] ED c/o 5d of NBNB diarrhea, dehydration, weakness, myalgias, dry cough, and worsening of his baseline tremor, fevers. Found to have BERTRAND w/ Cr 2.93 and covid-19. Admitted to [facility] for further management.        Problem List/Main Diagnoses (system-based):     1. Pneumonia due to COVID-19 virus.  Plan: COVID PCR positive (2/13/21), Decadron started 2/15 for 10 days.   -CXR w/ diffuse infiltrates b/l  -O2 requirements: 95% on 2L nc  -monitor inflammotory markers  - not eligible for remdesivir based on O2sat and BERTRAND.     Problem/Plan - 2:  ·  Problem: Bacterial pneumonia.  Plan: -Febrile to 102 in ED, w/ significantly elevated procal 67.29.  -CXR w/o focal consolidation, though crackles bilaterally. S/p ceftriaxone 1g, azithromycin 500mg in ED  -Received zosyn for 4 days (2/17- ) given that significant improvement was seen will continue treatment to a total of 5 days and will d/c  -f/u MRSA swab negative (2/15).     Problem/Plan - 3:  ·  Problem: Acute on chronic kidney failure.  Plan: Pt reports hx of renal failure with baseline GFR ~30. Follows with Dr. Jamil Tee (nephrology) and Dr. Ishaan Gaming (urology). On admission, found to have Cr 2.93 in setting of decreased PO intake, fevers, covid-19. Cr function did not improve with fluid boluses. CK 1585. likely intrinsic process possibly 2/2 COVID vs rhabdomyolysis. Previously was retaining and had condon, no longer retaining or with condon.   -Monitor with daily BMP, CK,   -Avoid nephrotoxic medications.  - Likely at new baseline for kidney function .      Problem/Plan - 4:  ·  Problem: Afib.  Plan: No known hx AFib. Had 16sec of PAT with HR 150s around 2am 2/15. Also noted to have rapid afib in HR 150s. S/p amio IV and on lopressor 25 BID.  Pt persistently tachycardic 120s-130s.  - d/c PO amiodarone prior to 10g bolus completion   - c/w metoprolol 50 BID for rate control.   - Cardiology consult, reccs appreciated.   - s/p 60mg IV lasix yesterday x1.     #Prolonged QTc  QTc 502 on admission, no hx of QTc-prolonging medications  -careful with QTc prolonging meds.      Problem/Plan - 5:  ·  Problem: Essential tremor.  Plan: Essential tremor. Plan: Tremor for >10yrs, worse with intention. Self-medicates with vodka, he takes 2 shots of vodka every morning one martini at night with improvement of his symptoms. There has been no increase in the amount of vodka he drinks during acute worsening of tremor. Worsening likely 2/2 acute infection and dehydration.   -Pt independent in ADLs at baseline, however daughter requesting assessment for home health aid after discharge.  -F/u social work consult.  -starting ativan 1mg q8 prn 2/16 for treatment of tremors, appears to help. Will keep on   - Added primidone as well for tremor.   - PT ordered.    #Alcohol use  Drinks as above. CIWAs max 8, previously.  -Ativan 1mg PRN for CIWA >8  -CIWA protocol, monitor for signs of alcohol withdrawal. Has not had elevated CIWAs on 2WO. Receiving ativan only for tremor.      Problem/Plan - 6:  Problem: Hypertension. Plan: Home medication: losartan 50mg qd  -continue holding in setting of BERTRAND       #Elevated trop 0.02  No chest pain, no ischemic changes on EKG. Peaked on 2/15 at .05  -Likely demand 2/2 covid-19, poor clearance from BERTRAND-on-CKD  -If c/o of chest pain, stat EKG and repeat cardiac enzymes.     Problem/Plan - 7:  ·  Problem: Mitral valve prolapse.  Plan: Hx of MVP. Pt does not have a cardiologist.  -monitor BPs, cardiology consult.      Problem/Plan - 8:  ·  Problem: Chronic pulmonary embolism, unspecified pulmonary embolism type, unspecified whether acute cor pulmonale present.  Plan: Has hx of PE, per daughter ~5yrs ago. First time provoked after hernia surgery, was on xarelto for a few months, then unprovoked in 2016 off xarelto after 3 months, then resumed Xarelto. Currently on xarelto 10mg qd for indefinite treatment of PE. No hx of malignancy, immobilization, or other risk factors, likely unprovoked. xarelto contraindicated in BERTRAND.   - hep gtt -> switched to eliquis 2.5 BID 2/16  - echo with normal RV fxn but increased in size.      Problem/Plan - 9:  ·  Problem: Sepsis.  Plan: Patient had worsening sepsis. Febrile to 106F overnight 2/15=4, and tachycardic. Transferred to 2WO then 3WO for further monitoring. Likely 2/2 COVID-19 infection vs. superimposed bacterial PNA. On zosyn   - c/w COVID management, bacterial PNA management as above  - EKG: sinus tachycardia, incomplete RBBB (QRS 110ms), Left anterior fascicle block, with frequent APCs.   - no longer febrile in last 24 hrs      Inpatient treatment course: 85M PMH of b/l PE (provoked, then unprovoked 2016, on xarelto), mitral valve prolapse, and CKD (reports baseline GFR 30) who presented to [facility] ED 2/13 admitted for AHRF 2/2 COVID PNA. Not given remdesivir for BERTRAND on CKD but decadron started 2/15 for x10 doses.  On the evening of 2/14, rapid response called for fever to 106F, hypoxia requiring 2L NC and tachypnea. Patient hyperthermic but awake, alert and oriented to baseline, and was protecting his airway. Was given additional 650mg Tylenol suppository, active cooling, started on Zosyn for broad coverage. He was then transferred to 2WO for worsening sepsis, and stepped up to 3WO 2/15 for AFib RVR, hypotension, and worsening sepsis. On 3WO his HR was controlled with amio gtt trasitioned to PO and metoprolol tartrate 25 BID. He was weaned off phenylephrine gtt for hypotension and AC transitioned to apixaban. Pt was stepped down to 2WO where cards was consulted for his afib hx, he was removed from amiodarone and metoprolol was increased to 50 mg BID. Pt now complaining of diarrhea, Zosyn d/c'd as no longer suspicion of HAP, and LFTs elevated so hep panel ordered for AM. The patient was weaned down to 2L NC and is safe and stable for floor transfer.      New medications:      Labs to be followed outpatient:     Exam to be followed outpatient: ***INCOMPLETE***  #Discharge: do not delete    85M w/ PMH of 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 ED c/o 5d of NBNB diarrhea, dehydration, weakness, myalgias, dry cough, and worsening of his baseline tremor, fevers. Found to have BERTRAND w/ Cr 2.93 and covid-19. Admitted to [facility] for further management.        Problem List/Main Diagnoses (system-based):     1. Pneumonia due to COVID-19 virus: COVID PCR positive (2/13/21, 2/22/21), Decadron 2/15-2/24, not eligible for remdesivir. CXR w/ diffuse infiltrates b/l, O2 requirements: 95-97% on 2L nc. COVID inflammatory markers all remain elevated but overall downward trend, Pt stable for d/c to [facility] with one more day of Decadron, continued VTE ppx, and follow up appts.     2. Bacterial pneumonia: Pt was febrile to 102 in ED, w/ significantly elevated procal 67.29. CXR w/o focal consolidation, though crackles auscultated bilaterally. S/p ceftriaxone 1g, azithromycin 500mg in ED, and zosyn for 5 days (2/17-2/21). MRSA swab negative. Pt now remains afebrile with Procal resolved and BCx NGTD.    3. Acute on chronic kidney failure: Pt reports hx of renal failure with baseline GFR ~30. Follows with Dr. Jamil Tee (nephrology) and Dr. Ishaan Gaming (urology). On admission, found to have Cr 2.93 in setting of decreased PO intake, fevers, covid-19. Cr function did not improve with fluid boluses. CK 1585. likely intrinsic process possibly 2/2 COVID vs rhabdomyolysis. Previously was retaining and had condon, no longer retaining or with condon. Avoided nephrotoxic medications this admission, monitored labs daily discharged close to baseline function with BUN 52 Cr 1.83 and GFR of 33     4. Afib: No known hx AFib. Had 16sec of PAT with HR 150s around 2am 2/15. Also noted to have rapid afib in HR 150s. S/p amio IV and on lopressor 50 BID, HR now persistently borderline bradycardic NSR, decreased Metoprolol from 50 mg BID to 25 mg BID 2/20, continued on renally adjusted Eliquis 2.5 mg BID for AC. Echo 2/17 showed normal EF, normal systolic function, with RV dilation. Cardiology consulted and switched to toprol 100 mg daily on discharge with recommended outpatient f/u w/ Dr Velarde       #Prolonged QTc  QTc 502 on admission, no hx of QTc-prolonging medications  -careful with QTc prolonging meds.      Problem/Plan - 5:  ·  Problem: Essential tremor.  Plan: Essential tremor. Plan: Tremor for >10yrs, worse with intention. Self-medicates with vodka, he takes 2 shots of vodka every morning one martini at night with improvement of his symptoms. There has been no increase in the amount of vodka he drinks during acute worsening of tremor. Worsening likely 2/2 acute infection and dehydration.   -Pt independent in ADLs at baseline, however daughter requesting assessment for home health aid after discharge.  -F/u social work consult.  -starting ativan 1mg q8 prn 2/16 for treatment of tremors, appears to help. Will keep on   - Added primidone as well for tremor.   - PT ordered.    #Alcohol use  Drinks as above. CIWAs max 8, previously.  -Ativan 1mg PRN for CIWA >8  -CIWA protocol, monitor for signs of alcohol withdrawal. Has not had elevated CIWAs on 2WO. Receiving ativan only for tremor.      Problem/Plan - 6:  Problem: Hypertension. Plan: Home medication: losartan 50mg qd  -continue holding in setting of BERTRAND       #Elevated trop 0.02  No chest pain, no ischemic changes on EKG. Peaked on 2/15 at .05  -Likely demand 2/2 covid-19, poor clearance from BERTRAND-on-CKD  -If c/o of chest pain, stat EKG and repeat cardiac enzymes.     Problem/Plan - 7:  ·  Problem: Mitral valve prolapse.  Plan: Hx of MVP. Pt does not have a cardiologist.  -monitor BPs, cardiology consult.      Problem/Plan - 8:  ·  Problem: Chronic pulmonary embolism, unspecified pulmonary embolism type, unspecified whether acute cor pulmonale present.  Plan: Has hx of PE, per daughter ~5yrs ago. First time provoked after hernia surgery, was on xarelto for a few months, then unprovoked in 2016 off xarelto after 3 months, then resumed Xarelto. Currently on xarelto 10mg qd for indefinite treatment of PE. No hx of malignancy, immobilization, or other risk factors, likely unprovoked. xarelto contraindicated in BERTRAND.   - hep gtt -> switched to eliquis 2.5 BID 2/16  - echo with normal RV fxn but increased in size.      Problem/Plan - 9:  ·  Problem: Sepsis.  Plan: Patient had worsening sepsis. Febrile to 106F overnight 2/15=4, and tachycardic. Transferred to 2WO then 3WO for further monitoring. Likely 2/2 COVID-19 infection vs. superimposed bacterial PNA. On zosyn   - c/w COVID management, bacterial PNA management as above  - EKG: sinus tachycardia, incomplete RBBB (QRS 110ms), Left anterior fascicle block, with frequent APCs.   - no longer febrile in last 24 hrs      Inpatient treatment course: 85M PMH of b/l PE (provoked, 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 ED 2/13 admitted for AHRF 2/2 COVID PNA. Not given remdesivir for BERTRAND on CKD but decadron started 2/15 for x10 doses.  On the evening of 2/14, rapid response called for fever to 106F, hypoxia requiring 2L NC and tachypnea. Patient hyperthermic but awake, alert and oriented to baseline, and was protecting his airway. Was given additional 650mg Tylenol suppository, active cooling, started on Zosyn for broad coverage. He was then transferred to 2WO for worsening sepsis, and stepped up to 3WO 2/15 for AFib RVR, hypotension, and worsening sepsis. On 3WO his HR was controlled with amio gtt trasitioned to PO and metoprolol tartrate 25 BID. He was weaned off phenylephrine gtt for hypotension and AC transitioned to apixaban. Pt was stepped down to 2WO where cards was consulted for his afib hx, he was removed from amiodarone and metoprolol was increased to 50 mg BID. Pt now complaining of diarrhea, Zosyn d/c'd as no longer suspicion of HAP, and LFTs elevated so hep panel ordered for AM. The patient was weaned down to 2L NC and is safe and stable for floor transfer.      New medications:      Labs to be followed outpatient:     Exam to be followed outpatient: ***INCOMPLETE***  #Discharge: do not delete    85M w/ PMH of b/l PE (on xarelto), mitral valve prolapse, and CKD (reports baseline GFR 30) who presents to [facility] ED c/o 5d of NBNB diarrhea, dehydration, weakness, myalgias, dry cough, and worsening of his baseline tremor, fevers. Found to have BERTRAND w/ Cr 2.93 and covid-19. Admitted to [facility] for further management.        Problem List/Main Diagnoses (system-based):     1. Pneumonia due to COVID-19 virus: COVID PCR positive (2/13/21, 2/22/21), Decadron 2/15-2/24, not eligible for remdesivir. CXR w/ diffuse infiltrates b/l, O2 requirements: 95-97% on 2L nc. COVID inflammatory markers all remain elevated but overall downward trend, Pt stable for d/c to [facility] with one more day of Decadron, continued VTE ppx, and follow up appts.     2. Bacterial pneumonia: Pt was febrile to 102 in ED, w/ significantly elevated procal 67.29. CXR w/o focal consolidation, though crackles auscultated bilaterally. S/p ceftriaxone 1g, azithromycin 500mg in ED, and zosyn for 5 days (2/17-2/21). MRSA swab negative. Pt now remains afebrile with Procal resolved and BCx NGTD.    3. Acute on chronic kidney failure: Pt reports hx of renal failure with baseline GFR ~30. Follows with Dr. Jamil Tee (nephrology) and Dr. Ishaan Gaming (urology). On admission, found to have Cr 2.93 in setting of decreased PO intake, fevers, covid-19. Cr function did not improve with fluid boluses. CK 1585. likely intrinsic process possibly 2/2 COVID vs rhabdomyolysis. Previously was retaining and had condon, no longer retaining or with condon. Avoided nephrotoxic medications this admission, monitored labs daily discharged close to baseline function with BUN 52 Cr 1.83 and GFR of 33     4. Afib: No known hx AFib. Had 16sec of PAT with HR 150s around 2am 2/15. Also noted to have rapid afib in HR 150s. S/p amio IV and on lopressor 50 BID, HR now persistently borderline bradycardic NSR, decreased Metoprolol from 50 mg BID to 25 mg BID 2/20, continued on renally adjusted Eliquis 2.5 mg BID for AC. Of note, pt with prolonged QTc on admission EKG, avoided QTc prolonging meds this admisison. Echo 2/17 showed normal EF, normal systolic function, with RV dilation. Cardiology consulted and switched to toprol 100 mg daily on discharge with recommended outpatient f/u w/ Dr Velarde, which will also discuss his history of MVP     5. Essential tremor: Tremor for >10yrs, worse with intention. Self-medicates with vodka, he takes 2 shots of vodka every morning one martini at night with improvement of his symptoms. There has been no increase in the amount of vodka he drinks during acute worsening of tremor. Worsening likely 2/2 acute infection and dehydration. Pt independent in ADLs at baseline, however daughter requesting assessment for home health aid after discharge. PT evaluated and recommends [facility] after discharge and pt would benefit from HHA after that. Pt on ativan 1mg q8 prn 2/16 for treatment of tremors, switched to PRN 2/19. started on primidone 50 mg daily as well and will continue on discharge.    6. Alcohol use: Drinks as above for self medicated essential tremor. CIWAs max 8 on admission, likely influenced by baseline tremor. Ativan 1mg PRN for CIWA >8, pt did not have elevated CIWA for remainder of hospitalization    7. Hypertension: Pt takes losartan 50mg qd at home, held i/s/o BERTRAND but safe to resume on d/c now that pt back to baseline kidney function       8. Elevated trop: On admission troponin slightly elevated to 0.02. No chest pain, no ischemic changes on EKG. Peaked on 2/15 at .05. Likely demand 2/2 covid-19, poor clearance from BERTRAND-on-CKD. Pt continue dot have no complaints of CP    9. Chronic pulmonary embolism, unspecified pulmonary embolism type, unspecified whether acute cor pulmonale present: Has hx of PE, per daughter ~5yrs ago. First time provoked after hernia surgery, was on xarelto for a few months, then unprovoked in 2016 off xarelto after 3 months, then resumed Xarelto. Currently on xarelto 10mg qd for indefinite treatment of PE. No hx of malignancy, immobilization, or other risk factors, likely unprovoked. Echo with RV dilation but no strain or dysfunction. Xarelto contraindicated in BERTRAND. Pt on hep gtt and transitioned to eliquis 2.5 BID which he will continue on discharge. F/U with cardiology and PCP on discharge.     10. Sepsis: Patient had worsening sepsis. Febrile to 106F overnight 2/15=4, and tachycardic. Transferred to 2WO then 3WO for further monitoring. Likely 2/2 COVID-19 infection vs. superimposed bacterial PNA. On zosyn   - c/w COVID management, bacterial PNA management as above  - EKG: sinus tachycardia, incomplete RBBB (QRS 110ms), Left anterior fascicle block, with frequent APCs.   - no longer febrile in last 24 hrs      Inpatient treatment course: 85M PMH of b/l PE (provoked, then unprovoked 2016, on xarelto), mitral valve prolapse, and CKD (reports baseline GFR 30) who presented to [facility] ED 2/13 admitted for AHRF 2/2 COVID PNA. Not given remdesivir for BERTRAND on CKD but decadron started 2/15 for x10 doses.  On the evening of 2/14, rapid response called for fever to 106F, hypoxia requiring 2L NC and tachypnea. Patient hyperthermic but awake, alert and oriented to baseline, and was protecting his airway. Was given additional 650mg Tylenol suppository, active cooling, started on Zosyn for broad coverage. He was then transferred to 2WO for worsening sepsis, and stepped up to 3WO 2/15 for AFib RVR, hypotension, and worsening sepsis. On 3WO his HR was controlled with amio gtt trasitioned to PO and metoprolol tartrate 25 BID. He was weaned off phenylephrine gtt for hypotension and AC transitioned to apixaban. Pt was stepped down to 2WO where cards was consulted for his afib hx, he was removed from amiodarone and metoprolol was increased to 50 mg BID. Pt now complaining of diarrhea, Zosyn d/c'd as no longer suspicion of HAP, and LFTs elevated so hep panel ordered for AM. The patient was weaned down to 2L NC and is safe and stable for floor transfer.      New medications:      Labs to be followed outpatient:     Exam to be followed outpatient: ***INCOMPLETE***  #Discharge: do not delete    85M w/ PMH of b/l PE (on xarelto), mitral valve prolapse, and CKD (reports baseline GFR 30) who presents to [facility] ED c/o 5d of NBNB diarrhea, dehydration, weakness, myalgias, dry cough, and worsening of his baseline tremor, fevers. Found to have BERTRAND w/ Cr 2.93 and covid-19. Admitted to [facility] for further management, stabilized and ready for d/c to [facility] for further PT and conditioning       Problem List/Main Diagnoses (system-based):     1. Pneumonia due to COVID-19 virus: COVID PCR positive (2/13/21, 2/22/21), Decadron 2/15-2/24, not eligible for remdesivir. CXR w/ diffuse infiltrates b/l, O2 requirements: 95-97% on 2L nc. COVID inflammatory markers all remain elevated but overall downward trend, Pt stable for d/c to [facility] with one more day of Decadron, continued VTE ppx, and follow up appts.     2. Bacterial pneumonia: Pt was febrile to 102 in ED, w/ significantly elevated procal 67.29. CXR w/o focal consolidation, though crackles auscultated bilaterally. S/p ceftriaxone 1g, azithromycin 500mg in ED, and zosyn for 5 days (2/17-2/21). MRSA swab negative. Pt now remains afebrile with Procal resolved and BCx NGTD.    3. Acute on chronic kidney failure: Pt reports hx of renal failure with baseline GFR ~30. Follows with Dr. Jamil Tee (nephrology) and Dr. Ishaan Gaming (urology). On admission, found to have Cr 2.93 in setting of decreased PO intake, fevers, covid-19. Cr function did not improve with fluid boluses. CK 1585. likely intrinsic process possibly 2/2 COVID vs rhabdomyolysis. Previously was retaining and had condon, no longer retaining or with condon. Avoided nephrotoxic medications this admission, monitored labs daily discharged close to baseline function with BUN 52 Cr 1.83 and GFR of 33     4. Afib: No known hx AFib. Had 16sec of PAT with HR 150s around 2am 2/15. Also noted to have rapid afib in HR 150s. S/p amio IV and on lopressor 50 BID, HR now persistently borderline bradycardic NSR, decreased Metoprolol from 50 mg BID to 25 mg BID 2/20, continued on renally adjusted Eliquis 2.5 mg BID for AC. Of note, pt with prolonged QTc on admission EKG, avoided QTc prolonging meds this admisison. Echo 2/17 showed normal EF, normal systolic function, with RV dilation. Cardiology consulted and continued on 25 mg BID toprol daily on discharge with recommended outpatient f/u w/ Dr Velarde, which will also discuss his history of MVP.     5. Essential tremor: Tremor for >10yrs, worse with intention. Self-medicates with vodka, he takes 2 shots of vodka every morning one martini at night with improvement of his symptoms. There has been no increase in the amount of vodka he drinks during acute worsening of tremor. Worsening likely 2/2 acute infection and dehydration. Pt independent in ADLs at baseline, however daughter requesting assessment for home health aid after discharge. PT evaluated and recommends [facility] after discharge and pt would benefit from [facility] after that. Pt on ativan 1mg q8 prn 2/16 for treatment of tremors, switched to PRN 2/19. started on primidone 50 mg daily as well and will continue on discharge.    6. Alcohol use: Drinks as above for self medicated essential tremor. CIWAs max 8 on admission, likely influenced by baseline tremor. Ativan 1mg PRN for CIWA >8, pt did not have elevated CIWA for remainder of hospitalization    7. Hypertension: Pt takes losartan 50mg qd at home, held i/s/o BERTRAND but safe to resume on d/c now that pt back to baseline kidney function       8. Elevated trop: On admission troponin slightly elevated to 0.02. No chest pain, no ischemic changes on EKG. Peaked on 2/15 at .05. Likely demand 2/2 covid-19, poor clearance from BERTRAND-on-CKD. Pt continue dot have no complaints of CP    9. Chronic pulmonary embolism, unspecified pulmonary embolism type, unspecified whether acute cor pulmonale present: Has hx of PE, per daughter ~5yrs ago. First time provoked after hernia surgery, was on xarelto for a few months, then unprovoked in 2016 off xarelto after 3 months, then resumed Xarelto. Currently on xarelto 10mg qd for indefinite treatment of PE. No hx of malignancy, immobilization, or other risk factors, likely unprovoked. Echo with RV dilation but no strain or dysfunction. Xarelto contraindicated in BERTRAND. Pt on hep gtt and transitioned to eliquis 2.5 BID which he will continue on discharge. F/U with cardiology and PCP on discharge.     10. Sepsis: Patient had worsening sepsis with temp 106F overnight 2/15 and tachycardic. Transferred to 2WO then 3WO for further monitoring. Likely 2/2 COVID-19 infection vs. superimposed bacterial PNA. Pt continued on zosyn, stabilized, and sepsis resolved. Pt stable for d/c to [facility] for further conditioning.      Inpatient treatment course: 85M PMH of b/l PE (provoked, then unprovoked 2016, on xarelto), mitral valve prolapse, and CKD (reports baseline GFR 30) who presented to [facility] ED 2/13 admitted for AHRF 2/2 COVID PNA. Not given remdesivir for BERTRAND on CKD but decadron started 2/15 for x10 doses.  On the evening of 2/14, rapid response called for fever to 106F, hypoxia requiring 2L NC and tachypnea. Patient hyperthermic but awake, alert and oriented to baseline, and was protecting his airway. Was given additional 650mg Tylenol suppository, active cooling, started on Zosyn for broad coverage. He was then transferred to 2WO for worsening sepsis, and stepped up to 3WO 2/15 for AFib RVR, hypotension, and worsening sepsis. On 3WO his HR was controlled with amio gtt trasitioned to PO and metoprolol tartrate 25 BID. He was weaned off phenylephrine gtt for hypotension and AC transitioned to apixaban. Pt was stepped down to 2WO where cards was consulted for his afib hx, he was removed from amiodarone and metoprolol was increased to 50 mg BID. Pt now complaining of diarrhea, Zosyn d/c'd as no longer suspicion of HAP, and LFTs elevated so hep panel ordered for AM. The patient was weaned down to 2L NC and is safe and stable for floor transfer.      New medications: dexamethasone 6 mg for one more day after d/c, continue Eliquis 2.5 BID      Labs to be followed outpatient: None    Exam to be followed outpatient: Afib with cardiology and PCP #Discharge: do not delete    85M w/ PMH of b/l PE (on xarelto), mitral valve prolapse, and CKD (reports baseline GFR 30) who presents to [facility] ED c/o 5d of NBNB diarrhea, dehydration, weakness, myalgias, dry cough, and worsening of his baseline tremor, fevers. Found to have BERTRAND w/ Cr 2.93 and covid-19. Admitted to [facility] for further management, pt physical exam 2/23 improved with only slight crackles at the posterior bases, 12 pt ROS negative, pt stable and ready for d/c to [facility] for further PT and conditioning with continued VTE ppx and one more day of decadron.       Problem List/Main Diagnoses (system-based):     1. Pneumonia due to COVID-19 virus: COVID PCR positive (2/13/21, 2/22/21), Decadron 2/15-2/24, not eligible for remdesivir. CXR w/ diffuse infiltrates b/l, O2 requirements: 95-97% on 2L nc. COVID inflammatory markers all remain elevated but overall downward trend, Pt stable for d/c to [facility] with one more day of Decadron, continued VTE ppx, and follow up appts.     2. Bacterial pneumonia: Pt was febrile to 102 in ED, w/ significantly elevated procal 67.29. CXR w/o focal consolidation, though crackles auscultated bilaterally. S/p ceftriaxone 1g, azithromycin 500mg in ED, and zosyn for 5 days (2/17-2/21). MRSA swab negative. Pt now remains afebrile with Procal resolved and BCx NGTD.    3. Acute on chronic kidney failure: Pt reports hx of renal failure with baseline GFR ~30. Follows with Dr. Jamil Tee (nephrology) and Dr. Ishaan Gaming (urology). On admission, found to have Cr 2.93 in setting of decreased PO intake, fevers, covid-19. Cr function did not improve with fluid boluses. CK 1585. likely intrinsic process possibly 2/2 COVID vs rhabdomyolysis. Previously was retaining and had condon, no longer retaining or with condon. Avoided nephrotoxic medications this admission, monitored labs daily discharged close to baseline function with BUN 52 Cr 1.83 and GFR of 33     4. Afib: No known hx AFib. Had 16sec of PAT with HR 150s around 2am 2/15. Also noted to have rapid afib in HR 150s. S/p amio IV and on lopressor 50 BID, HR now persistently borderline bradycardic NSR, decreased Metoprolol from 50 mg BID to 25 mg BID 2/20, continued on renally adjusted Eliquis 2.5 mg BID for AC. Of note, pt with prolonged QTc on admission EKG, avoided QTc prolonging meds this admisison. Echo 2/17 showed normal EF, normal systolic function, with RV dilation. Cardiology consulted and continued on 25 mg BID toprol daily on discharge with recommended outpatient f/u w/ Dr Velarde, which will also discuss his history of MVP.     5. Essential tremor: Tremor for >10yrs, worse with intention. Self-medicates with vodka, he takes 2 shots of vodka every morning one martini at night with improvement of his symptoms. There has been no increase in the amount of vodka he drinks during acute worsening of tremor. Worsening likely 2/2 acute infection and dehydration. Pt independent in ADLs at baseline, however daughter requesting assessment for home health aid after discharge. PT evaluated and recommends KAIDEN after discharge and pt would benefit from HHA after that. Pt on ativan 1mg q8 prn 2/16 for treatment of tremors, switched to PRN 2/19. started on primidone 50 mg daily as well and will continue on discharge.    6. Alcohol use: Drinks as above for self medicated essential tremor. CIWAs max 8 on admission, likely influenced by baseline tremor. Ativan 1mg PRN for CIWA >8, pt did not have elevated CIWA for remainder of hospitalization    7. Hypertension: Pt takes losartan 50mg qd at home, held i/s/o BERTRAND but safe to resume on d/c now that pt back to baseline kidney function       8. Elevated trop: On admission troponin slightly elevated to 0.02. No chest pain, no ischemic changes on EKG. Peaked on 2/15 at .05. Likely demand 2/2 covid-19, poor clearance from BERTRAND-on-CKD. Pt continue dot have no complaints of CP    9. Chronic pulmonary embolism, unspecified pulmonary embolism type, unspecified whether acute cor pulmonale present: Has hx of PE, per daughter ~5yrs ago. First time provoked after hernia surgery, was on xarelto for a few months, then unprovoked in 2016 off xarelto after 3 months, then resumed Xarelto. Currently on xarelto 10mg qd for indefinite treatment of PE. No hx of malignancy, immobilization, or other risk factors, likely unprovoked. Echo with RV dilation but no strain or dysfunction. Xarelto contraindicated in BERTRAND. Pt on hep gtt and transitioned to eliquis 2.5 BID which he will continue on discharge. F/U with cardiology and PCP on discharge.     10. Sepsis: Patient had worsening sepsis with temp 106F overnight 2/15 and tachycardic. Transferred to 2WO then 3WO for further monitoring. Likely 2/2 COVID-19 infection vs. superimposed bacterial PNA. Pt continued on zosyn, stabilized, and sepsis resolved. Pt stable for d/c to [facility] for further conditioning.      Inpatient treatment course: 85M PMH of b/l PE (provoked, then unprovoked 2016, on xarelto), mitral valve prolapse, and CKD (reports baseline GFR 30) who presented to [facility] ED 2/13 admitted for AHRF 2/2 COVID PNA. Not given remdesivir for BERTRAND on CKD but decadron started 2/15 for x10 doses.  On the evening of 2/14, rapid response called for fever to 106F, hypoxia requiring 2L NC and tachypnea. Patient hyperthermic but awake, alert and oriented to baseline, and was protecting his airway. Was given additional 650mg Tylenol suppository, active cooling, started on Zosyn for broad coverage. He was then transferred to 2WO for worsening sepsis, and stepped up to 3WO 2/15 for AFib RVR, hypotension, and worsening sepsis. On 3WO his HR was controlled with amio gtt trasitioned to PO and metoprolol tartrate 25 BID. He was weaned off phenylephrine gtt for hypotension and AC transitioned to apixaban. Pt was stepped down to 2WO where cards was consulted for his afib hx, he was removed from amiodarone and metoprolol was increased to 50 mg BID. Pt now complaining of diarrhea, Zosyn d/c'd as no longer suspicion of HAP, and LFTs elevated so hep panel ordered for AM. The patient was weaned down to 2L NC and is safe and stable for floor transfer.      New medications: dexamethasone 6 mg for one more day after d/c, continue Eliquis 2.5 BID      Labs to be followed outpatient: None    Exam to be followed outpatient: Afib with cardiology and PCP

## 2021-02-19 NOTE — DISCHARGE NOTE PROVIDER - CARE PROVIDER_API CALL
Shane Velarde (MD)  Cardiovascular Disease; Internal Medicine  Cardiology [facility], [address] [place] [address]  Phone: [phone]  Fax: [phone]  Follow Up Time: 2 weeks   Shane Velarde (MD)  Cardiovascular Disease; Internal Medicine  Cardiology [facility], [address] [place] [address]  Phone: [phone]  Fax: [phone]  Follow Up Time: 2 weeks    Ishaan Gaming  INTERNAL MEDICINE  [address] [place] [address], [address]  [place] [address]  Phone: [phone]  Fax: [phone]  Established Patient  Follow Up Time: 2 weeks

## 2021-02-19 NOTE — DISCHARGE NOTE PROVIDER - NSDCMRMEDTOKEN_GEN_ALL_CORE_FT
losartan 50 mg oral tablet: 1 tab(s) orally once a day  omeprazole 40 mg oral delayed release capsule: 1 cap(s) orally once a day  rivaroxaban 10 mg oral tablet: 1 tab(s) orally once a day   apixaban 2.5 mg oral tablet: 1 tab(s) orally every 12 hours  dexamethasone: 6 milligram(s) orally once a day  folic acid 1 mg oral tablet: 1 tab(s) orally once a day  losartan 50 mg oral tablet: 1 tab(s) orally once a day  metoprolol succinate 25 mg oral tablet, extended release: 1 tab(s) orally every 12 hours  Multiple Vitamins oral tablet: 1 tab(s) orally once a day  omeprazole 40 mg oral delayed release capsule: 1 cap(s) orally once a day  primidone 50 mg oral tablet: 1 tab(s) orally once a day  thiamine 100 mg oral tablet: 1 tab(s) orally once a day  zinc sulfate 220 mg oral capsule: 1 cap(s) orally once a day

## 2021-02-19 NOTE — PROGRESS NOTE ADULT - PROBLEM SELECTOR PLAN 2
-Febrile to 102 in ED, w/ significantly elevated procal 67.29.  -CXR w/o focal consolidation, though crackles bilaterally. S/p ceftriaxone 1g, azithromycin 500mg in ED  -Received zosyn for 4 days (2/17- ) given that significant improvement was seen will continue treatment to a total of 5 days and will d/c  -f/u MRSA swab negative (2/15) -Febrile to 102 in ED, w/ significantly elevated procal 67.29.  -CXR w/o focal consolidation, though crackles bilaterally. S/p ceftriaxone 1g, azithromycin 500mg in ED  -Received zosyn for 4 days (2/17- ) given that significant improvement was seen will continue treatment to a total of 5 days and will d/c

## 2021-02-19 NOTE — PROGRESS NOTE ADULT - PROBLEM SELECTOR PLAN 5
Pt reports hx of renal failure with baseline GFR ~30. Follows with Dr. Jamil Tee (nephrology) and Dr. Ishaan Gaming (urology). On admission, found to have Cr 2.93 in setting of decreased PO intake, fevers, covid-19. Cr function did not improve with fluid boluses. CK 1585. likely intrinsic process possibly 2/2 COVID vs rhabdomyolysis. Previously was retaining and had condon, no longer retaining or with condon.   -Monitor with daily BMP, CK,   -Avoid nephrotoxic medications.  -Likely at new baseline for kidney function .

## 2021-02-19 NOTE — PROGRESS NOTE ADULT - ASSESSMENT
per Internal Medicine    84 yo M PMH b/l PE (on xarelto), mitral valve prolapse, and CKD (reports baseline GFR 30) who presented to [facility] ED c/o 5d of NBNB diarrhea, dehydration, weakness, myalgias, dry cough, and worsening of his baseline tremor, and intermittent fevers (Tmax 102F at home), found to be COVID-19+, BERTRAND on CKD. Transferred to 2WO due to worsening sepsis then stepped up to 3WO 2/15 for AFib RVR, has been stable on 2WO since stepdown and is now stable for transfer to [facility].    Problem/Plan - 1:  ·  Problem: Pneumonia due to COVID-19 virus.  Plan: COVID PCR positive (2/13/21), Decadron started 2/15 for 10 days.   -CXR w/ diffuse infiltrates b/l  -O2 requirements: 95% on 2L nc  -monitor inflammotory markers  - not eligible for remdesivir based on O2sat and BERTRAND.    Problem/Plan - 2:  ·  Problem: Bacterial pneumonia.  Plan: -Febrile to 102 in ED, w/ significantly elevated procal 67.29.  -CXR w/o focal consolidation, though crackles bilaterally. S/p ceftriaxone 1g, azithromycin 500mg in ED  -Received zosyn for 4 days (2/17- ) given that significant improvement was seen will continue treatment to a total of 5 days and will d/c  -f/u MRSA swab negative (2/15).    Problem/Plan - 3:  ·  Problem: Acute on chronic kidney failure.  Plan: Pt reports hx of renal failure with baseline GFR ~30. Follows with Dr. Jamil Tee (nephrology) and Dr. Ishaan Gaming (urology). On admission, found to have Cr 2.93 in setting of decreased PO intake, fevers, covid-19. Cr function did not improve with fluid boluses. CK 1585. likely intrinsic process possibly 2/2 COVID vs rhabdomyolysis. Previously was retaining and had condon, no longer retaining or with condon.   -Monitor with daily BMP, CK,   -Avoid nephrotoxic medications.  - Likely at new baseline for kidney function .     Problem/Plan - 4:  ·  Problem: Afib.  Plan: No known hx AFib. Had 16sec of PAT with HR 150s around 2am 2/15. Also noted to have rapid afib in HR 150s. S/p amio IV and on lopressor 25 BID.  Pt persistently tachycardic 120s-130s.  - d/c PO amiodarone prior to 10g bolus completion   - c/w metoprolol 50 BID for rate control.   - Cardiology consult, reccs appreciated.   - s/p 60mg IV lasix yesterday x1.     #Prolonged QTc  QTc 502 on admission, no hx of QTc-prolonging medications  -careful with QTc prolonging meds.     Problem/Plan - 5:  ·  Problem: Essential tremor.  Plan: Essential tremor. Plan: Tremor for >10yrs, worse with intention. Self-medicates with vodka, he takes 2 shots of vodka every morning one martini at night with improvement of his symptoms. There has been no increase in the amount of vodka he drinks during acute worsening of tremor. Worsening likely 2/2 acute infection and dehydration.   -Pt independent in ADLs at baseline, however daughter requesting assessment for home health aid after discharge.  -F/u social work consult.  -starting ativan 1mg q8 prn 2/16 for treatment of tremors, appears to help. Will keep on   - Added primidone as well for tremor.   - PT ordered.    #Alcohol use  Drinks as above. CIWAs max 8, previously.  -Ativan 1mg PRN for CIWA >8  -CIWA protocol, monitor for signs of alcohol withdrawal. Has not had elevated CIWAs on 2WO. Receiving ativan only for tremor.     Problem/Plan - 6:  Problem: Hypertension. Plan: Home medication: losartan 50mg qd  -continue holding in setting of BERTRAND       #Elevated trop 0.02  No chest pain, no ischemic changes on EKG. Peaked on 2/15 at .05  -Likely demand 2/2 covid-19, poor clearance from BERTRAND-on-CKD  -If c/o of chest pain, stat EKG and repeat cardiac enzymes.    Problem/Plan - 7:  ·  Problem: Mitral valve prolapse.  Plan: Hx of MVP. Pt does not have a cardiologist.  -monitor BPs, cardiology consult.     Problem/Plan - 8:  ·  Problem: Chronic pulmonary embolism, unspecified pulmonary embolism type, unspecified whether acute cor pulmonale present.  Plan: Has hx of PE, per daughter ~5yrs ago. First time provoked after hernia surgery, was on xarelto for a few months, then unprovoked in 2016 off xarelto after 3 months, then resumed Xarelto. Currently on xarelto 10mg qd for indefinite treatment of PE. No hx of malignancy, immobilization, or other risk factors, likely unprovoked. xarelto contraindicated in BERTRAND.   - hep gtt -> switched to eliquis 2.5 BID 2/16  - echo with normal RV fxn but increased in size.     Problem/Plan - 9:  ·  Problem: Sepsis.  Plan: Patient had worsening sepsis. Febrile to 106F overnight 2/15=4, and tachycardic. Transferred to 2WO then 3WO for further monitoring. Likely 2/2 COVID-19 infection vs. superimposed bacterial PNA. On zosyn   - c/w COVID management, bacterial PNA management as above  - EKG: sinus tachycardia, incomplete RBBB (QRS 110ms), Left anterior fascicle block, with frequent APCs.   - no longer febrile in last 24 hrs    RESOLVED.     Problem/Plan - 10:  Problem: Nutrition, metabolism, and development symptoms. Plan; F: encourage PO  E: replete to K>4, Mg>2, Phos>2.5  N: regular diet  Ppx: apixaban 2.5mg  Code Status: full code  Dispo: covid-tele.

## 2021-02-19 NOTE — DISCHARGE NOTE PROVIDER - NSDCFUADDAPPT_GEN_ALL_CORE_FT
(1) Please note that the office of your Cardiology Provider, Dr. Shane Velarde will call you from [phone] to schedule a telehealth appointment within 2 weeks of discharge from the hospital.  Appointment was facilitated by Ms. GENEVIEVE Vee, Referral Coordinator.    (2) Please note that the office of your Primary Care Provider, Dr. Ishaan Gaming will call you from [phone] to schedule an appointment within 2 weeks of discharge from the hospital.    Appointment was facilitated by Ms. GENEVIEVE Vee, Referral Coordinator.

## 2021-02-19 NOTE — PROGRESS NOTE ADULT - PROBLEM SELECTOR PLAN 6
No known hx AFib. Had 16sec of PAT with HR 150s around 2am 2/15. Also noted to have rapid afib in HR 150s. S/p amio IV and on lopressor 50 BID, HR now persistently borderline bradycardic NSR  - c/w metoprolol 50 BID for rate control.   - Echo 2/17 showed normal EF, normal systolic function, with RV dilation  - Card recs switch to toprol 100 mg daily on discharge and f/u w/ Dr Velarde    #Prolonged QTc  QTc 502 on admission, no hx of QTc-prolonging medications  -careful with QTc prolonging meds.    #Mitral Valve Prolapse  - Pt without a cardiologist  - Monitor BP

## 2021-02-20 LAB
ALBUMIN SERPL ELPH-MCNC: 3.1 G/DL — LOW (ref 3.3–5)
ALP SERPL-CCNC: 420 U/L — HIGH (ref 40–120)
ALT FLD-CCNC: 248 U/L — HIGH (ref 10–45)
ANION GAP SERPL CALC-SCNC: 15 MMOL/L — SIGNIFICANT CHANGE UP (ref 5–17)
AST SERPL-CCNC: 157 U/L — HIGH (ref 10–40)
BASOPHILS # BLD AUTO: 0 K/UL — SIGNIFICANT CHANGE UP (ref 0–0.2)
BASOPHILS NFR BLD AUTO: 0 % — SIGNIFICANT CHANGE UP (ref 0–2)
BILIRUB SERPL-MCNC: 0.8 MG/DL — SIGNIFICANT CHANGE UP (ref 0.2–1.2)
BUN SERPL-MCNC: 68 MG/DL — HIGH (ref 7–23)
CALCIUM SERPL-MCNC: 8.8 MG/DL — SIGNIFICANT CHANGE UP (ref 8.4–10.5)
CHLORIDE SERPL-SCNC: 103 MMOL/L — SIGNIFICANT CHANGE UP (ref 96–108)
CO2 SERPL-SCNC: 19 MMOL/L — LOW (ref 22–31)
CREAT SERPL-MCNC: 2.4 MG/DL — HIGH (ref 0.5–1.3)
CRP SERPL-MCNC: 4.1 MG/DL — HIGH (ref 0–0.4)
CULTURE RESULTS: SIGNIFICANT CHANGE UP
CULTURE RESULTS: SIGNIFICANT CHANGE UP
D DIMER BLD IA.RAPID-MCNC: 2011 NG/ML DDU — HIGH
DACRYOCYTES BLD QL SMEAR: SLIGHT — SIGNIFICANT CHANGE UP
EOSINOPHIL # BLD AUTO: 0 K/UL — SIGNIFICANT CHANGE UP (ref 0–0.5)
EOSINOPHIL NFR BLD AUTO: 0 % — SIGNIFICANT CHANGE UP (ref 0–6)
FERRITIN SERPL-MCNC: 5741 NG/ML — HIGH (ref 30–400)
GIANT PLATELETS BLD QL SMEAR: PRESENT — SIGNIFICANT CHANGE UP
GLUCOSE SERPL-MCNC: 123 MG/DL — HIGH (ref 70–99)
HCT VFR BLD CALC: 38.9 % — LOW (ref 39–50)
HGB BLD-MCNC: 13.1 G/DL — SIGNIFICANT CHANGE UP (ref 13–17)
LYMPHOCYTES # BLD AUTO: 0.09 K/UL — LOW (ref 1–3.3)
LYMPHOCYTES # BLD AUTO: 0.9 % — LOW (ref 13–44)
MAGNESIUM SERPL-MCNC: 2.7 MG/DL — HIGH (ref 1.6–2.6)
MANUAL SMEAR VERIFICATION: SIGNIFICANT CHANGE UP
MCHC RBC-ENTMCNC: 31.8 PG — SIGNIFICANT CHANGE UP (ref 27–34)
MCHC RBC-ENTMCNC: 33.7 GM/DL — SIGNIFICANT CHANGE UP (ref 32–36)
MCV RBC AUTO: 94.4 FL — SIGNIFICANT CHANGE UP (ref 80–100)
MONOCYTES # BLD AUTO: 0.41 K/UL — SIGNIFICANT CHANGE UP (ref 0–0.9)
MONOCYTES NFR BLD AUTO: 4.3 % — SIGNIFICANT CHANGE UP (ref 2–14)
NEUTROPHILS # BLD AUTO: 8.81 K/UL — HIGH (ref 1.8–7.4)
NEUTROPHILS NFR BLD AUTO: 93.1 % — HIGH (ref 43–77)
OVALOCYTES BLD QL SMEAR: SLIGHT — SIGNIFICANT CHANGE UP
PHOSPHATE SERPL-MCNC: 4.4 MG/DL — SIGNIFICANT CHANGE UP (ref 2.5–4.5)
PLAT MORPH BLD: ABNORMAL
PLATELET # BLD AUTO: 318 K/UL — SIGNIFICANT CHANGE UP (ref 150–400)
POIKILOCYTOSIS BLD QL AUTO: SLIGHT — SIGNIFICANT CHANGE UP
POLYCHROMASIA BLD QL SMEAR: SLIGHT — SIGNIFICANT CHANGE UP
POTASSIUM SERPL-MCNC: 4.5 MMOL/L — SIGNIFICANT CHANGE UP (ref 3.5–5.3)
POTASSIUM SERPL-SCNC: 4.5 MMOL/L — SIGNIFICANT CHANGE UP (ref 3.5–5.3)
PROT SERPL-MCNC: 6.3 G/DL — SIGNIFICANT CHANGE UP (ref 6–8.3)
RBC # BLD: 4.12 M/UL — LOW (ref 4.2–5.8)
RBC # FLD: 14.1 % — SIGNIFICANT CHANGE UP (ref 10.3–14.5)
RBC BLD AUTO: ABNORMAL
SODIUM SERPL-SCNC: 137 MMOL/L — SIGNIFICANT CHANGE UP (ref 135–145)
SPECIMEN SOURCE: SIGNIFICANT CHANGE UP
SPECIMEN SOURCE: SIGNIFICANT CHANGE UP
SPHEROCYTES BLD QL SMEAR: SLIGHT — SIGNIFICANT CHANGE UP
TROPONIN T SERPL-MCNC: <0.01 NG/ML — SIGNIFICANT CHANGE UP (ref 0–0.01)
VARIANT LYMPHS # BLD: 1.7 % — SIGNIFICANT CHANGE UP (ref 0–6)
WBC # BLD: 9.46 K/UL — SIGNIFICANT CHANGE UP (ref 3.8–10.5)
WBC # FLD AUTO: 9.46 K/UL — SIGNIFICANT CHANGE UP (ref 3.8–10.5)

## 2021-02-20 PROCEDURE — 99233 SBSQ HOSP IP/OBS HIGH 50: CPT | Mod: CS,GC

## 2021-02-20 PROCEDURE — 93970 EXTREMITY STUDY: CPT | Mod: 26

## 2021-02-20 PROCEDURE — 99233 SBSQ HOSP IP/OBS HIGH 50: CPT | Mod: GC

## 2021-02-20 PROCEDURE — 93010 ELECTROCARDIOGRAM REPORT: CPT

## 2021-02-20 PROCEDURE — 76770 US EXAM ABDO BACK WALL COMP: CPT | Mod: 26

## 2021-02-20 RX ORDER — METOPROLOL TARTRATE 50 MG
25 TABLET ORAL EVERY 12 HOURS
Refills: 0 | Status: DISCONTINUED | OUTPATIENT
Start: 2021-02-20 | End: 2021-02-23

## 2021-02-20 RX ADMIN — APIXABAN 2.5 MILLIGRAM(S): 2.5 TABLET, FILM COATED ORAL at 11:16

## 2021-02-20 RX ADMIN — ZINC SULFATE TAB 220 MG (50 MG ZINC EQUIVALENT) 220 MILLIGRAM(S): 220 (50 ZN) TAB at 11:16

## 2021-02-20 RX ADMIN — Medication 1 TABLET(S): at 11:16

## 2021-02-20 RX ADMIN — CHLORHEXIDINE GLUCONATE 1 APPLICATION(S): 213 SOLUTION TOPICAL at 07:08

## 2021-02-20 RX ADMIN — Medication 50 MILLIGRAM(S): at 12:03

## 2021-02-20 RX ADMIN — APIXABAN 2.5 MILLIGRAM(S): 2.5 TABLET, FILM COATED ORAL at 23:25

## 2021-02-20 RX ADMIN — PRIMIDONE 50 MILLIGRAM(S): 250 TABLET ORAL at 11:16

## 2021-02-20 RX ADMIN — Medication 6 MILLIGRAM(S): at 00:47

## 2021-02-20 RX ADMIN — Medication 1 MILLIGRAM(S): at 11:16

## 2021-02-20 RX ADMIN — PANTOPRAZOLE SODIUM 40 MILLIGRAM(S): 20 TABLET, DELAYED RELEASE ORAL at 07:09

## 2021-02-20 NOTE — PROGRESS NOTE ADULT - PROBLEM SELECTOR PLAN 3
Pt with elevated d-dimer today 2011, similar to 2/19. Pt w/out calf tenderness, no edema or asymmetry of the extremities however reported some pain in LEs, no chest pain, no increased 02 requirements. Currently on Eliquis 2.5 BID however subtherapeutic dosing for treatment of VTE if present.   - Ordered for Dopplers to assess for DVT, f/u  - continue to monitor D-dimer  - If patient with increasing 02 requirements, will obtain CT PE protocol to r/o for PE

## 2021-02-20 NOTE — PROGRESS NOTE ADULT - ATTENDING COMMENTS
-Pt seen and examined  -Currently CP free and HD stable  -Currently Sinus Mohan w/ HRs 50-70s; Asymptomatic  -can change Lopressor to Toprol 25 BID  -c/w Eliquis 2.5 BID  -No evidence of ACS; Trops negative x 2; TTE: Normal LV/RV Fxn; RV mildly dilated, Normal Atria, No significant valvular disease  -Can consider repeat TTE if clinical deterioration ie worsening hypotension or volume overload  -No indication for further ischemic w/u at this time    Nader Slaughter MD  Cardiology Attending

## 2021-02-20 NOTE — PROGRESS NOTE ADULT - PROBLEM SELECTOR PLAN 10
F: No IVF indicated, encourage PO intake  E: replete to K>4, Mg>2, Phos>2.5  N: regular diet, Ensure Enlive BID  Ppx: apixaban 2.5mg  BID (renally adjusted, indication: Afib)  Code Status: full code  Dispo: COVID - RMF, PT recommending KAIDEN

## 2021-02-20 NOTE — PROGRESS NOTE ADULT - SUBJECTIVE AND OBJECTIVE BOX
Cardiology Consult    O/N:  Interval History:  Telemetry:    OBJECTIVE  Vitals:  T(C): 36.3 (02-20-21 @ 15:09), Max: 36.6 (02-19-21 @ 20:39)  HR: 54 (02-20-21 @ 15:09) (54 - 69)  BP: 151/91 (02-20-21 @ 15:09) (135/80 - 158/102)  RR: 18 (02-20-21 @ 15:09) (16 - 18)  SpO2: 93% (02-20-21 @ 15:09) (93% - 98%)  Wt(kg): --    I/O:  I&O's Summary    19 Feb 2021 07:01  -  20 Feb 2021 07:00  --------------------------------------------------------  IN: 100 mL / OUT: 1100 mL / NET: -1000 mL    20 Feb 2021 07:01  -  20 Feb 2021 16:04  --------------------------------------------------------  IN: 0 mL / OUT: 450 mL / NET: -450 mL        PHYSICAL EXAM:  Appearance: NAD. Speaking in full sentences.   HEENT: No pallor noted.  Conjunctiva clear b/l. Moist oral mucosa.  Cardiovascular: RRR with no murmurs.  Respiratory: Lungs CTAB.   Gastrointestinal:  Soft, nontender. Non-distended. Non-rigid.	  Extremities: No edema b/l. No erythema b/l. LE WWP b/l.  Vascular: DP intact  Neurologic:  Alert and awake. Moving all extremities. Following commands.   	  LABS:                        13.1   9.46  )-----------( 318      ( 20 Feb 2021 06:07 )             38.9     02-20    137  |  103  |  68<H>  ----------------------------<  123<H>  4.5   |  19<L>  |  2.40<H>    Ca    8.8      20 Feb 2021 06:07  Phos  4.4     02-20  Mg     2.7     02-20    TPro  6.3  /  Alb  3.1<L>  /  TBili  0.8  /  DBili  x   /  AST  157<H>  /  ALT  248<H>  /  AlkPhos  420<H>  02-20          RADIOLOGY & ADDITIONAL TESTS:  Reviewed .    MEDICATIONS  (STANDING):  apixaban 2.5 milliGRAM(s) Oral every 12 hours  chlorhexidine 2% Cloths 1 Application(s) Topical <User Schedule>  dexAMETHasone  Injectable 6 milliGRAM(s) IV Push every 24 hours  folic acid 1 milliGRAM(s) Oral daily  metoprolol tartrate 50 milliGRAM(s) Oral every 12 hours  multivitamin 1 Tablet(s) Oral daily  pantoprazole    Tablet 40 milliGRAM(s) Oral before breakfast  primidone 50 milliGRAM(s) Oral daily  zinc sulfate 220 milliGRAM(s) Oral daily    MEDICATIONS  (PRN):  acetaminophen   Tablet .. 650 milliGRAM(s) Oral every 4 hours PRN Temp greater or equal to 38C (100.4F), Mild Pain (1 - 3)  LORazepam   Injectable 1 milliGRAM(s) IV Push every 1 hour PRN CIWA-Ar score 8 or greater     Cardiology Consult    HPI: 85M PMH PE, MVP, CKD, essential tremor (uses ETOH for management) presented with cough, weakness, worsening tremors and fevers and admitted for sepsis 2/2 to covid. Course complicated by Afib with RVR for which cardiology initially consulted. Reconsulted for HR in 50s while on lopressor, hemodynamically stable and asymptomatic. Also of note, patient reported an episode of chest vs epigastric tenderness. EKG demonstrates sinus rhythm with RBBB, TWI noted in V4-6. Patient reports poor memory of event. States it felt like a sharp stick in the chest vs epigastric region, no radiation, while at rest without exacerbating or palliating factors. Trop neg yesterday at time of episode.   Patient currently has no complaints. No palpitations. No chest pain. No dizziness, no headaches. Patient has minimally      OBJECTIVE  Vitals:  T(C): 36.3 (02-20-21 @ 15:09), Max: 36.6 (02-19-21 @ 20:39)  HR: 54 (02-20-21 @ 15:09) (54 - 69)  BP: 151/91 (02-20-21 @ 15:09) (135/80 - 158/102)  RR: 18 (02-20-21 @ 15:09) (16 - 18)  SpO2: 93% (02-20-21 @ 15:09) (93% - 98%)  Wt(kg): --    I/O:  I&O's Summary    19 Feb 2021 07:01  -  20 Feb 2021 07:00  --------------------------------------------------------  IN: 100 mL / OUT: 1100 mL / NET: -1000 mL    20 Feb 2021 07:01  -  20 Feb 2021 16:04  --------------------------------------------------------  IN: 0 mL / OUT: 450 mL / NET: -450 mL        PHYSICAL EXAM:  Appearance: NAD. Speaking in full sentences.   HEENT: No pallor noted.  Conjunctiva clear b/l. Moist oral mucosa.  Cardiovascular: RRR with no murmurs.  Respiratory: Lungs CTAB.   Gastrointestinal:  Soft, nontender. Non-distended. Non-rigid.	  Extremities: No edema b/l. No erythema b/l. LE WWP b/l.  Vascular: DP intact  Neurologic:  Alert and awake. Moving all extremities. Following commands.   	  LABS:                        13.1   9.46  )-----------( 318      ( 20 Feb 2021 06:07 )             38.9     02-20    137  |  103  |  68<H>  ----------------------------<  123<H>  4.5   |  19<L>  |  2.40<H>    Ca    8.8      20 Feb 2021 06:07  Phos  4.4     02-20  Mg     2.7     02-20    TPro  6.3  /  Alb  3.1<L>  /  TBili  0.8  /  DBili  x   /  AST  157<H>  /  ALT  248<H>  /  AlkPhos  420<H>  02-20          RADIOLOGY & ADDITIONAL TESTS:  Reviewed .    MEDICATIONS  (STANDING):  apixaban 2.5 milliGRAM(s) Oral every 12 hours  chlorhexidine 2% Cloths 1 Application(s) Topical <User Schedule>  dexAMETHasone  Injectable 6 milliGRAM(s) IV Push every 24 hours  folic acid 1 milliGRAM(s) Oral daily  metoprolol tartrate 50 milliGRAM(s) Oral every 12 hours  multivitamin 1 Tablet(s) Oral daily  pantoprazole    Tablet 40 milliGRAM(s) Oral before breakfast  primidone 50 milliGRAM(s) Oral daily  zinc sulfate 220 milliGRAM(s) Oral daily    MEDICATIONS  (PRN):  acetaminophen   Tablet .. 650 milliGRAM(s) Oral every 4 hours PRN Temp greater or equal to 38C (100.4F), Mild Pain (1 - 3)  LORazepam   Injectable 1 milliGRAM(s) IV Push every 1 hour PRN CIWA-Ar score 8 or greater

## 2021-02-20 NOTE — PROGRESS NOTE ADULT - PROBLEM SELECTOR PLAN 7
Essential tremor. Plan: Tremor for >10yrs, worse with intention. Self-medicates with vodka, he takes 2 shots of vodka every morning one martini at night with improvement of his symptoms. There has been no increase in the amount of vodka he drinks during acute worsening of tremor. Worsening likely 2/2 acute infection and dehydration.   -Pt independent in ADLs at baseline, PT recommending [facility], however daughter requesting assessment for home health aid after discharge.  -social work on board given EtOH history  -c/w ativan 1mg q8 prn for treatment of tremors  - c/w primidone for tremor      #Alcohol use  Drinks as above. CIWAs max 8, previously.  -Ativan 1mg PRN for CIWA >8  -CIWA protocol, monitor for signs of alcohol withdrawal. Has not had elevated CIWAs on 2WO. Receiving ativan only for tremor.

## 2021-02-20 NOTE — PROGRESS NOTE ADULT - SUBJECTIVE AND OBJECTIVE BOX
OVERNIGHT EVENTS:    SUBJECTIVE / INTERVAL HPI: Patient seen and examined at bedside.     VITAL SIGNS:  Vital Signs Last 24 Hrs  T(C): 36.3 (20 Feb 2021 08:48), Max: 36.6 (19 Feb 2021 20:39)  T(F): 97.4 (20 Feb 2021 08:48), Max: 97.8 (19 Feb 2021 20:39)  HR: 56 (20 Feb 2021 08:48) (53 - 64)  BP: 158/102 (20 Feb 2021 08:48) (135/80 - 160/97)  BP(mean): --  RR: 18 (20 Feb 2021 08:48) (16 - 18)  SpO2: 95% (20 Feb 2021 08:48) (93% - 98%)    PHYSICAL EXAM:    General: WDWN  HEENT: NC/AT; PERRL, anicteric sclera; MMM  Neck: supple  Cardiovascular: +S1/S2; RRR  Respiratory: CTA B/L; no W/R/R  Gastrointestinal: soft, NT/ND; +BSx4  Extremities: WWP; no edema, clubbing or cyanosis  Vascular: 2+ radial, DP/PT pulses B/L  Neurological: AAOx3; no focal deficits    MEDICATIONS:  MEDICATIONS  (STANDING):  apixaban 2.5 milliGRAM(s) Oral every 12 hours  chlorhexidine 2% Cloths 1 Application(s) Topical <User Schedule>  dexAMETHasone  Injectable 6 milliGRAM(s) IV Push every 24 hours  folic acid 1 milliGRAM(s) Oral daily  metoprolol tartrate 50 milliGRAM(s) Oral every 12 hours  multivitamin 1 Tablet(s) Oral daily  pantoprazole    Tablet 40 milliGRAM(s) Oral before breakfast  primidone 50 milliGRAM(s) Oral daily  zinc sulfate 220 milliGRAM(s) Oral daily    MEDICATIONS  (PRN):  acetaminophen   Tablet .. 650 milliGRAM(s) Oral every 4 hours PRN Temp greater or equal to 38C (100.4F), Mild Pain (1 - 3)  LORazepam   Injectable 1 milliGRAM(s) IV Push every 1 hour PRN CIWA-Ar score 8 or greater      ALLERGIES:  Allergies    No Known Allergies    Intolerances        LABS:                        13.1   9.46  )-----------( 318      ( 20 Feb 2021 06:07 )             38.9     02-20    137  |  103  |  68<H>  ----------------------------<  123<H>  4.5   |  19<L>  |  2.40<H>    Ca    8.8      20 Feb 2021 06:07  Phos  4.4     02-20  Mg     2.7     02-20    TPro  6.3  /  Alb  3.1<L>  /  TBili  0.8  /  DBili  x   /  AST  157<H>  /  ALT  248<H>  /  AlkPhos  420<H>  02-20        CAPILLARY BLOOD GLUCOSE      POCT Blood Glucose.: 147 mg/dL (18 Feb 2021 22:26)      RADIOLOGY & ADDITIONAL TESTS: Reviewed.    ASSESSMENT:    PLAN:  OVERNIGHT EVENTS: HRs in 50s in AM, AM Metoprolol held. Otherwise, NAEs overnight.    SUBJECTIVE / INTERVAL HPI: Patient seen and examined at bedside. Patient reports being concerned about his kidney function and was inquiring about his GFR on his morning labs. Otherwise, still reports a cough and feels overall weak. He wants to be back at his baseline and reports to be independent. Otherwise, denies fevers, chills, NS, chest pain, shortness of breath, nausea/vomiting, constipation, diarrhea. Remainder of ROS negative.     VITAL SIGNS:  Vital Signs Last 24 Hrs  T(C): 36.3 (20 Feb 2021 08:48), Max: 36.6 (19 Feb 2021 20:39)  T(F): 97.4 (20 Feb 2021 08:48), Max: 97.8 (19 Feb 2021 20:39)  HR: 56 (20 Feb 2021 08:48) (53 - 64)  BP: 158/102 (20 Feb 2021 08:48) (135/80 - 160/97)  BP(mean): --  RR: 18 (20 Feb 2021 08:48) (16 - 18)  SpO2: 95% (20 Feb 2021 08:48) (93% - 98%)    PHYSICAL EXAM:    General: NAD  male, well developed  HEENT: NC/AT; EOMI, PERRLA, anicteric sclera; with mask on, on 2L NC  Neck: supple, trachea midline  Cardiovascular: RRR, +S1/S2; NO M/R/G  Respiratory: bibasilar rales, no rhonchi or wheezing; no accessory muscle use or increased work of breathing  Gastrointestinal: soft, non-tender, distended but soft, +BSx4  Extremities: WWP; no edema or cyanosis  Vascular: 2+ radial, DP/PT pulses B/L  Neurological: AAOx3; no focal deficits    MEDICATIONS:  MEDICATIONS  (STANDING):  apixaban 2.5 milliGRAM(s) Oral every 12 hours  chlorhexidine 2% Cloths 1 Application(s) Topical <User Schedule>  dexAMETHasone  Injectable 6 milliGRAM(s) IV Push every 24 hours  folic acid 1 milliGRAM(s) Oral daily  metoprolol tartrate 50 milliGRAM(s) Oral every 12 hours  multivitamin 1 Tablet(s) Oral daily  pantoprazole    Tablet 40 milliGRAM(s) Oral before breakfast  primidone 50 milliGRAM(s) Oral daily  zinc sulfate 220 milliGRAM(s) Oral daily    MEDICATIONS  (PRN):  acetaminophen   Tablet .. 650 milliGRAM(s) Oral every 4 hours PRN Temp greater or equal to 38C (100.4F), Mild Pain (1 - 3)  LORazepam   Injectable 1 milliGRAM(s) IV Push every 1 hour PRN CIWA-Ar score 8 or greater      ALLERGIES:  Allergies    No Known Allergies    Intolerances        LABS:                        13.1   9.46  )-----------( 318      ( 20 Feb 2021 06:07 )             38.9     02-20    137  |  103  |  68<H>  ----------------------------<  123<H>  4.5   |  19<L>  |  2.40<H>    Ca    8.8      20 Feb 2021 06:07  Phos  4.4     02-20  Mg     2.7     02-20    TPro  6.3  /  Alb  3.1<L>  /  TBili  0.8  /  DBili  x   /  AST  157<H>  /  ALT  248<H>  /  AlkPhos  420<H>  02-20        CAPILLARY BLOOD GLUCOSE      POCT Blood Glucose.: 147 mg/dL (18 Feb 2021 22:26)      RADIOLOGY & ADDITIONAL TESTS: Reviewed.    ASSESSMENT:    PLAN:

## 2021-02-20 NOTE — PROGRESS NOTE ADULT - ASSESSMENT
85M PMH b/l PE (on xarelto), mitral valve prolapse, and CKD (reports baseline GFR 30) who presented to [facility] ED c/o 5d of NBNB diarrhea, dehydration, weakness, myalgias, dry cough, and worsening of his baseline tremor, and intermittent fevers (Tmax 102F at home), found to be COVID-19+, BERTRAND on CKD. Transferred to 2WO due to worsening sepsis then stepped up to 3WO 2/15 for AFib RVR, stepped down to 2WO 2/16, stabilized and further stepped down to RMF 2/18, now with HC c/b transaminitis, improving.

## 2021-02-20 NOTE — PROGRESS NOTE ADULT - ASSESSMENT
ASSESSMENT:  85M PMH PE, MVP, CKD, Essential tremors (ETOH use) presented initially with Covid pneumonia and course complicated by Afib rvr for which cardiology following then signed off. Cardiology reconsulted for sinus bradycardia in 50s, TWI in v4-6 in setting of episode of chest pain.    PLAN:  #Afib  Course of Afib with RVR, on lopressor 50 BID, CHADsVASC 2 (Age) on eliquis. Called for asymptomatic sinus bradycardia  - Would recommend transitioning to long acting BID, toprol 25 BID, HR in 50s are ok when asymptomatic and hemodynamically stable  -c/w eliquis    #Chest pain  Atypical pain, troponin cleared in setting of CKD and remain negative today. EKG sinus segundo with RBBB and TWI. Low suspicion for acute ACS event at this time. No further need to trend unless change in clinical status.        Discussed with consult attending.

## 2021-02-20 NOTE — PROGRESS NOTE ADULT - PROBLEM SELECTOR PLAN 9
With hx of PE, per daughter ~5yrs ago, provoked 1st time after hernia surgery, s/p xarelto x few months, then unprovoked in 2016 off xarelto after 3 months, then resumed Xarelto. On xarelto 10mg qd outpatient indefinitely for tx of PE. No hx of malignancy, immobilization, or other risk factors, likely unprovoked.   - c/w eliquis 2.5 BID 2/16

## 2021-02-20 NOTE — PROGRESS NOTE ADULT - PROBLEM SELECTOR PLAN 5
Pt reports hx of renal failure with baseline GFR ~30. Follows with Dr. Jamil Tee (nephrology) and Dr. Ishaan Gaming (urology). On admission, found to have Cr 2.93 in setting of decreased PO intake, fevers, covid-19. Cr function did not improve with fluid boluses. CK 1585. likely intrinsic process possibly 2/2 COVID vs rhabdomyolysis. Previously was retaining and had condon, no longer retaining or with condon.   -Monitor with daily BMP, CK,   -Avoid nephrotoxic medications.  -Likely at new baseline for kidney function   -f/u renal US  -Continue to monitor I/Os (With condom cath)

## 2021-02-20 NOTE — PROGRESS NOTE ADULT - PROBLEM SELECTOR PLAN 6
No known hx AFib. Had 16sec of PAT with HR 150s around 2am 2/15. Also noted to have rapid afib in HR 150s. S/p amio IV and on lopressor 50 BID, HR now persistently borderline bradycardic NSR  - c/w metoprolol 50 BID for rate control.   - c/w renally adjusted Eliquis 2.5 mg BID for AC  - Echo 2/17 showed normal EF, normal systolic function, with RV dilation  - Card recs switch to toprol 100 mg daily on discharge and f/u w/ Dr Velarde  -Will f/u with Cards re: HR goals    #Prolonged QTc  QTc 502 on admission, no hx of QTc-prolonging medications  -careful with QTc prolonging meds.  -Will repeat EKG today, 2/20

## 2021-02-20 NOTE — PROGRESS NOTE ADULT - PROBLEM SELECTOR PLAN 8
SBPs ranging from 130-150s, Home medication: losartan 50mg qd  -continue holding Losartan in setting of BERTRAND   -If persistently elevated overnight, will start Amlodipine 5 mg daily tomorrow, 2/21

## 2021-02-20 NOTE — PROGRESS NOTE ADULT - PROBLEM SELECTOR PLAN 1
COVID PCR positive (2/13/21), Decadron started 2/15 for 10 days.   -CXR w/ diffuse infiltrates b/l, moderately improved 2/19  -c/w Decadron 2/15-2/24  -No remdesivir 2/2 decreased renal function  -O2 requirements: 95% on 2L nc, will titrate as tolerated  -monitor COVID markers    #Weakness  Deconditioned, Ordered for PT

## 2021-02-20 NOTE — PROGRESS NOTE ADULT - PROBLEM SELECTOR PLAN 4
Improving. Pt w/ elevated ALT/AST and Alk Phos, Hep panel negative, CT abdomen revealing no identifiable source to explain transaminitis.  -Continue to monitor liver enzymes with daily labs, if uptrending will consult GI

## 2021-02-20 NOTE — PROGRESS NOTE ADULT - PROBLEM SELECTOR PLAN 2
s/p treatment. Febrile to 102 in ED, w/ significantly elevated procal 67.29. CXR w/o focal consolidation, though crackles bilaterally. s/p 5 days of Zosyn   -no additional abxs at this time

## 2021-02-21 LAB
ALBUMIN SERPL ELPH-MCNC: 2.9 G/DL — LOW (ref 3.3–5)
ALP SERPL-CCNC: 385 U/L — HIGH (ref 40–120)
ALT FLD-CCNC: 220 U/L — HIGH (ref 10–45)
ANION GAP SERPL CALC-SCNC: 13 MMOL/L — SIGNIFICANT CHANGE UP (ref 5–17)
ANISOCYTOSIS BLD QL: SLIGHT — SIGNIFICANT CHANGE UP
APPEARANCE UR: CLEAR — SIGNIFICANT CHANGE UP
AST SERPL-CCNC: 112 U/L — HIGH (ref 10–40)
BACTERIA # UR AUTO: PRESENT /HPF
BASE EXCESS BLDA CALC-SCNC: -4.5 MMOL/L — LOW (ref -2–3)
BASOPHILS # BLD AUTO: 0 K/UL — SIGNIFICANT CHANGE UP (ref 0–0.2)
BASOPHILS NFR BLD AUTO: 0 % — SIGNIFICANT CHANGE UP (ref 0–2)
BILIRUB SERPL-MCNC: 0.6 MG/DL — SIGNIFICANT CHANGE UP (ref 0.2–1.2)
BILIRUB UR-MCNC: NEGATIVE — SIGNIFICANT CHANGE UP
BUN SERPL-MCNC: 62 MG/DL — HIGH (ref 7–23)
CALCIUM SERPL-MCNC: 8.5 MG/DL — SIGNIFICANT CHANGE UP (ref 8.4–10.5)
CHLORIDE SERPL-SCNC: 106 MMOL/L — SIGNIFICANT CHANGE UP (ref 96–108)
CO2 SERPL-SCNC: 18 MMOL/L — LOW (ref 22–31)
COLOR SPEC: YELLOW — SIGNIFICANT CHANGE UP
COMMENT - URINE: SIGNIFICANT CHANGE UP
CREAT ?TM UR-MCNC: 91 MG/DL — SIGNIFICANT CHANGE UP
CREAT SERPL-MCNC: 2.04 MG/DL — HIGH (ref 0.5–1.3)
CRP SERPL-MCNC: 3.2 MG/DL — HIGH (ref 0–0.4)
D DIMER BLD IA.RAPID-MCNC: 2252 NG/ML DDU — HIGH
DIFF PNL FLD: ABNORMAL
EOSINOPHIL # BLD AUTO: 0 K/UL — SIGNIFICANT CHANGE UP (ref 0–0.5)
EOSINOPHIL NFR BLD AUTO: 0 % — SIGNIFICANT CHANGE UP (ref 0–6)
EPI CELLS # UR: SIGNIFICANT CHANGE UP /HPF (ref 0–5)
FERRITIN SERPL-MCNC: 4021 NG/ML — HIGH (ref 30–400)
GAS PNL BLDA: SIGNIFICANT CHANGE UP
GLUCOSE SERPL-MCNC: 126 MG/DL — HIGH (ref 70–99)
GLUCOSE UR QL: NEGATIVE — SIGNIFICANT CHANGE UP
HCO3 BLDA-SCNC: 18 MMOL/L — LOW (ref 21–28)
HCT VFR BLD CALC: 39.5 % — SIGNIFICANT CHANGE UP (ref 39–50)
HGB BLD-MCNC: 13.3 G/DL — SIGNIFICANT CHANGE UP (ref 13–17)
HYALINE CASTS # UR AUTO: SIGNIFICANT CHANGE UP /LPF (ref 0–2)
KETONES UR-MCNC: NEGATIVE — SIGNIFICANT CHANGE UP
LEUKOCYTE ESTERASE UR-ACNC: NEGATIVE — SIGNIFICANT CHANGE UP
LYMPHOCYTES # BLD AUTO: 0.21 K/UL — LOW (ref 1–3.3)
LYMPHOCYTES # BLD AUTO: 2.5 % — LOW (ref 13–44)
MACROCYTES BLD QL: SLIGHT — SIGNIFICANT CHANGE UP
MAGNESIUM SERPL-MCNC: 2.5 MG/DL — SIGNIFICANT CHANGE UP (ref 1.6–2.6)
MANUAL SMEAR VERIFICATION: SIGNIFICANT CHANGE UP
MCHC RBC-ENTMCNC: 32.4 PG — SIGNIFICANT CHANGE UP (ref 27–34)
MCHC RBC-ENTMCNC: 33.7 GM/DL — SIGNIFICANT CHANGE UP (ref 32–36)
MCV RBC AUTO: 96.3 FL — SIGNIFICANT CHANGE UP (ref 80–100)
METAMYELOCYTES # FLD: 0.9 % — HIGH (ref 0–0)
MICROCYTES BLD QL: SLIGHT — SIGNIFICANT CHANGE UP
MONOCYTES # BLD AUTO: 0.43 K/UL — SIGNIFICANT CHANGE UP (ref 0–0.9)
MONOCYTES NFR BLD AUTO: 5.1 % — SIGNIFICANT CHANGE UP (ref 2–14)
MYELOCYTES NFR BLD: 2.5 % — HIGH (ref 0–0)
NEUTROPHILS # BLD AUTO: 7.37 K/UL — SIGNIFICANT CHANGE UP (ref 1.8–7.4)
NEUTROPHILS NFR BLD AUTO: 86.4 % — HIGH (ref 43–77)
NEUTS BAND # BLD: 1.7 % — SIGNIFICANT CHANGE UP (ref 0–8)
NITRITE UR-MCNC: NEGATIVE — SIGNIFICANT CHANGE UP
OVALOCYTES BLD QL SMEAR: SLIGHT — SIGNIFICANT CHANGE UP
PCO2 BLDA: 25 MMHG — LOW (ref 35–48)
PH BLDA: 7.46 — HIGH (ref 7.35–7.45)
PH UR: 5 — SIGNIFICANT CHANGE UP (ref 5–8)
PHOSPHATE SERPL-MCNC: 4.4 MG/DL — SIGNIFICANT CHANGE UP (ref 2.5–4.5)
PLAT MORPH BLD: NORMAL — SIGNIFICANT CHANGE UP
PLATELET # BLD AUTO: 321 K/UL — SIGNIFICANT CHANGE UP (ref 150–400)
PO2 BLDA: 72 MMHG — LOW (ref 83–108)
POLYCHROMASIA BLD QL SMEAR: SLIGHT — SIGNIFICANT CHANGE UP
POTASSIUM SERPL-MCNC: 4.9 MMOL/L — SIGNIFICANT CHANGE UP (ref 3.5–5.3)
POTASSIUM SERPL-SCNC: 4.9 MMOL/L — SIGNIFICANT CHANGE UP (ref 3.5–5.3)
PROT ?TM UR-MCNC: 35 MG/DL — HIGH (ref 0–12)
PROT SERPL-MCNC: 6.3 G/DL — SIGNIFICANT CHANGE UP (ref 6–8.3)
PROT UR-MCNC: ABNORMAL MG/DL
PROT/CREAT UR-RTO: 0.4 RATIO — HIGH (ref 0–0.2)
RBC # BLD: 4.1 M/UL — LOW (ref 4.2–5.8)
RBC # FLD: 14 % — SIGNIFICANT CHANGE UP (ref 10.3–14.5)
RBC BLD AUTO: ABNORMAL
RBC CASTS # UR COMP ASSIST: < 5 /HPF — SIGNIFICANT CHANGE UP
SAO2 % BLDA: 95 % — SIGNIFICANT CHANGE UP (ref 95–100)
SODIUM SERPL-SCNC: 137 MMOL/L — SIGNIFICANT CHANGE UP (ref 135–145)
SODIUM UR-SCNC: 72 MMOL/L — SIGNIFICANT CHANGE UP
SP GR SPEC: 1.02 — SIGNIFICANT CHANGE UP (ref 1–1.03)
SPHEROCYTES BLD QL SMEAR: SLIGHT — SIGNIFICANT CHANGE UP
URATE CRY FLD QL MICRO: ABNORMAL /HPF
UROBILINOGEN FLD QL: 0.2 E.U./DL — SIGNIFICANT CHANGE UP
UUN UR-MCNC: 1295 MG/DL — SIGNIFICANT CHANGE UP
VARIANT LYMPHS # BLD: 0.9 % — SIGNIFICANT CHANGE UP (ref 0–6)
WBC # BLD: 8.36 K/UL — SIGNIFICANT CHANGE UP (ref 3.8–10.5)
WBC # FLD AUTO: 8.36 K/UL — SIGNIFICANT CHANGE UP (ref 3.8–10.5)
WBC UR QL: < 5 /HPF — SIGNIFICANT CHANGE UP

## 2021-02-21 PROCEDURE — 99233 SBSQ HOSP IP/OBS HIGH 50: CPT | Mod: CS,GC

## 2021-02-21 RX ADMIN — Medication 25 MILLIGRAM(S): at 11:53

## 2021-02-21 RX ADMIN — PRIMIDONE 50 MILLIGRAM(S): 250 TABLET ORAL at 11:32

## 2021-02-21 RX ADMIN — Medication 1 TABLET(S): at 11:32

## 2021-02-21 RX ADMIN — Medication 6 MILLIGRAM(S): at 00:36

## 2021-02-21 RX ADMIN — Medication 1 MILLIGRAM(S): at 11:32

## 2021-02-21 RX ADMIN — ZINC SULFATE TAB 220 MG (50 MG ZINC EQUIVALENT) 220 MILLIGRAM(S): 220 (50 ZN) TAB at 11:32

## 2021-02-21 RX ADMIN — Medication 25 MILLIGRAM(S): at 22:20

## 2021-02-21 RX ADMIN — PANTOPRAZOLE SODIUM 40 MILLIGRAM(S): 20 TABLET, DELAYED RELEASE ORAL at 06:10

## 2021-02-21 RX ADMIN — APIXABAN 2.5 MILLIGRAM(S): 2.5 TABLET, FILM COATED ORAL at 22:20

## 2021-02-21 RX ADMIN — CHLORHEXIDINE GLUCONATE 1 APPLICATION(S): 213 SOLUTION TOPICAL at 06:10

## 2021-02-21 RX ADMIN — APIXABAN 2.5 MILLIGRAM(S): 2.5 TABLET, FILM COATED ORAL at 09:27

## 2021-02-21 NOTE — PROGRESS NOTE ADULT - PROBLEM SELECTOR PLAN 6
No known hx AFib. Had 16sec of PAT with HR 150s around 2am 2/15. Also noted to have rapid afib in HR 150s. S/p amio IV and on lopressor 50 BID, HR now persistently borderline bradycardic NSR  - c/w metoprolol 50 BID for rate control.   - c/w renally adjusted Eliquis 2.5 mg BID for AC  - Echo 2/17 showed normal EF, normal systolic function, with RV dilation  - Card recs switch to toprol 100 mg daily on discharge and f/u w/ Dr Velarde  -Will f/u with Cards re: HR goals    #Prolonged QTc  QTc 502 on admission, no hx of QTc-prolonging medications  -careful with QTc prolonging meds.  -Will repeat EKG today, 2/20 No known hx AFib. Had 16sec of PAT with HR 150s around 2am 2/15. Also noted to have rapid afib in HR 150s. S/p amio IV and on lopressor 50 BID, HR now persistently borderline bradycardic NSR  - decreased Metoprolol from 50 mg BID to 25 mg BID 2/20 given low HRs yesterday (2/20). Continue.  - c/w renally adjusted Eliquis 2.5 mg BID for AC  - Echo 2/17 showed normal EF, normal systolic function, with RV dilation  - Card recs switch to toprol 100 mg daily on discharge and f/u w/ Dr Velarde  -Will f/u with Cards re: HR goals    #Prolonged QTc  QTc 502 on admission, no hx of QTc-prolonging medications  -careful with QTc prolonging meds.

## 2021-02-21 NOTE — PROGRESS NOTE ADULT - ASSESSMENT
85M PMH b/l PE (on xarelto), mitral valve prolapse, and CKD (reports baseline GFR 30) who presented to [facility] ED c/o 5d of NBNB diarrhea, dehydration, weakness, myalgias, dry cough, and worsening of his baseline tremor, and intermittent fevers (Tmax 102F at home), found to be COVID-19+, BERTRAND on CKD. Transferred to 2WO due to worsening sepsis then stepped up to 3WO 2/15 for AFib RVR, stepped down to 2WO 2/16, stabilized and further stepped down to RMF 2/18, now with HC c/b transaminitis, improving.

## 2021-02-21 NOTE — PROGRESS NOTE ADULT - NSHPATTENDINGPLANDISCUSS_GEN_ALL_CORE
tele team
resident physician, Dr Sean Garcia
HS, patient
House Staff
House Staff
resident physician, Alexy Leigh
tele team

## 2021-02-21 NOTE — CHART NOTE - NSCHARTNOTEFT_GEN_A_CORE
Nephrology was called to explained acid-base changes.   Repeat ABG recommended, noted: the pt has respiratory alkalosis, as PaCO2 is 25 mmHg and pH 7.46  PaCO2 is the primary change, which is typically associated with decrease HCO3, 18 mEq/L as the secondary change.  As AG not elevated, there is no organic metabolic acidosis.    Please contact Nephrology service as questions arise.

## 2021-02-21 NOTE — PROGRESS NOTE ADULT - SUBJECTIVE AND OBJECTIVE BOX
IN PROGRESS (INCLUDING ASSESSSMENT AND PLAN) OVERNIGHT EVENTS: NAEO    SUBJECTIVE / INTERVAL HPI: Patient seen and examined at bedside. This AM, feels fine, just weak from not moving around much. 12 pt ROS otherwise negative    VITAL SIGNS:  Vital Signs Last 24 Hrs  T(C): 36.3 (2021 21:12), Max: 36.4 (2021 21:53)  T(F): 97.3 (2021 21:12), Max: 97.6 (2021 06:31)  HR: 65 (2021 21:12) (54 - 77)  BP: 144/81 (2021 21:12) (123/78 - 159/93)  BP(mean): --  RR: 18 (2021 21:12) (17 - 18)  SpO2: 94% (2021 21:12) (86% - 95%)    PHYSICAL EXAM:  General: NAD  male, well developed  HEENT: NC/AT; EOMI, PERRLA, anicteric sclera; with mask on, on 2L NC  Neck: supple, trachea midline  Cardiovascular: RRR, +S1/S2; NO M/R/G  Respiratory: bibasilar rales, no rhonchi or wheezing; no accessory muscle use or increased work of breathing  Gastrointestinal: soft, non-tender, distended but soft, +BSx4  Extremities: WWP; no edema or cyanosis  Vascular: 2+ radial, DP/PT pulses B/L  Neurological: AAOx3; no focal deficits    MEDICATIONS:  MEDICATIONS  (STANDING):  apixaban 2.5 milliGRAM(s) Oral every 12 hours  chlorhexidine 2% Cloths 1 Application(s) Topical <User Schedule>  dexAMETHasone  Injectable 6 milliGRAM(s) IV Push every 24 hours  folic acid 1 milliGRAM(s) Oral daily  metoprolol succinate ER 25 milliGRAM(s) Oral every 12 hours  multivitamin 1 Tablet(s) Oral daily  pantoprazole    Tablet 40 milliGRAM(s) Oral before breakfast  primidone 50 milliGRAM(s) Oral daily  zinc sulfate 220 milliGRAM(s) Oral daily    MEDICATIONS  (PRN):  acetaminophen   Tablet .. 650 milliGRAM(s) Oral every 4 hours PRN Temp greater or equal to 38C (100.4F), Mild Pain (1 - 3)  LORazepam   Injectable 1 milliGRAM(s) IV Push every 1 hour PRN CIWA-Ar score 8 or greater    ALLERGIES:  Allergies    No Known Allergies    Intolerances    LABS:                        13.3   8.36  )-----------( 321      ( 2021 06:44 )             39.5         137  |  106  |  62<H>  ----------------------------<  126<H>  4.9   |  18<L>  |  2.04<H>    Ca    8.5      2021 06:44  Phos  4.4       Mg     2.5         TPro  6.3  /  Alb  2.9<L>  /  TBili  0.6  /  DBili  x   /  AST  112<H>  /  ALT  220<H>  /  AlkPhos  385<H>      Urinalysis Basic - ( 2021 14:03 )    Color: Yellow / Appearance: Clear / S.025 / pH: x  Gluc: x / Ketone: NEGATIVE  / Bili: Negative / Urobili: 0.2 E.U./dL   Blood: x / Protein: Trace mg/dL / Nitrite: NEGATIVE   Leuk Esterase: NEGATIVE / RBC: < 5 /HPF / WBC < 5 /HPF   Sq Epi: x / Non Sq Epi: 0-5 /HPF / Bacteria: Present /HPF    CAPILLARY BLOOD GLUCOSE    RADIOLOGY & ADDITIONAL TESTS: Reviewed.

## 2021-02-21 NOTE — PROGRESS NOTE ADULT - PROBLEM SELECTOR PLAN 1
COVID PCR positive (2/13/21), Decadron started 2/15 for 10 days.   -CXR w/ diffuse infiltrates b/l, moderately improved 2/19  -c/w Decadron 2/15-2/24  -No remdesivir 2/2 decreased renal function  -O2 requirements: 95% on 2L nc, will titrate as tolerated  -monitor COVID markers    #Weakness  Deconditioned, Ordered for PT COVID PCR positive (2/13/21), Decadron started 2/15 for 10 days.   -CXR w/ diffuse infiltrates b/l, moderately improved 2/19  -c/w Decadron 2/15-2/24  -No remdesivir 2/2 decreased renal function  -O2 requirements: 95% on 2L nc, will titrate as tolerated  -monitor COVID markers    #Non-AG Metabolic acidosis - bicarb downtrending, renal consult, ordered for ABG. Likely 2/2 renal insufficiency.     #Weakness  Deconditioned, Ordered for PT

## 2021-02-21 NOTE — PROGRESS NOTE ADULT - ATTENDING COMMENTS
85M PMH b/l PE (on xarelto), MVP, CKD (reports baseline GFR 30) presented to [facility] ED 2/13/2021 c/o 5d of NBNB diarrhea, dehydration, weakness, myalgias, dry cough, and worsening of his baseline tremor, and intermittent fevers (Tmax 102F at home), found to be COVID-19+, with BERTRAND on CKD, now improving. HC c/b HAP and Afib with RVR, now s/p abxs and rate controlled.     Notes feeling less short of breath and no chest pain, however very weak. Remainder of ROS negative. Physical exam unchanged from yesterday (2/20), still with bibasilar rales, on 2L NC however NAD.     #COVID 19 - c/w Deacrdon, no remdesivir given renal dysfunction   #Non-AG Metabolic acidosis - bicarb downtrending, renal consult, ordered for ABG. Likely 2/2 renal insufficiency.   #BERTRAND on CKD - Improving, nephrology consult  #Transaminitis -improving however still elevated, will continue to monitor   #Afib - decreased Metoprolol from 50 mg BID to 25 mg BID yesterday given low HRs yesterday (2/20). c/w Metoprolol and renally adjusted Eliquis 85M PMH b/l PE (on xarelto), MVP, CKD (reports baseline GFR 30) presented to [facility] ED 2/13/2021 c/o 5d of NBNB diarrhea, dehydration, weakness, myalgias, dry cough, and worsening of his baseline tremor, and intermittent fevers (Tmax 102F at home), found to be COVID-19+, with BERTRAND on CKD, now improving. HC c/b HAP and Afib with RVR, now s/p abxs and rate controlled.     Notes feeling less short of breath and no chest pain, however very weak. Remainder of ROS negative. Physical exam unchanged from yesterday (2/20), still with bibasilar rales, on 2L NC however NAD.     #COVID 19 - c/w Deacrdon, no remdesivir given renal dysfunction. D/w CM today re: need for home 02 (2L) as patient desaturated to 86% while on RA at rest. Paperwork started.  #Non-AG Metabolic acidosis - bicarb downtrending, renal consult, ordered for ABG. Likely 2/2 renal insufficiency.   #BERTRAND on CKD - Improving, nephrology consult  #Transaminitis -improving however still elevated, will continue to monitor   #Afib - decreased Metoprolol from 50 mg BID to 25 mg BID yesterday given low HRs yesterday (2/20). c/w Metoprolol and renally adjusted Eliquis

## 2021-02-22 LAB
ALBUMIN SERPL ELPH-MCNC: 3 G/DL — LOW (ref 3.3–5)
ALP SERPL-CCNC: 366 U/L — HIGH (ref 40–120)
ALT FLD-CCNC: 165 U/L — HIGH (ref 10–45)
ANION GAP SERPL CALC-SCNC: 11 MMOL/L — SIGNIFICANT CHANGE UP (ref 5–17)
AST SERPL-CCNC: 59 U/L — HIGH (ref 10–40)
BASOPHILS # BLD AUTO: 0 K/UL — SIGNIFICANT CHANGE UP (ref 0–0.2)
BASOPHILS NFR BLD AUTO: 0 % — SIGNIFICANT CHANGE UP (ref 0–2)
BILIRUB SERPL-MCNC: 0.7 MG/DL — SIGNIFICANT CHANGE UP (ref 0.2–1.2)
BUN SERPL-MCNC: 52 MG/DL — HIGH (ref 7–23)
CALCIUM SERPL-MCNC: 8.4 MG/DL — SIGNIFICANT CHANGE UP (ref 8.4–10.5)
CHLORIDE SERPL-SCNC: 105 MMOL/L — SIGNIFICANT CHANGE UP (ref 96–108)
CO2 SERPL-SCNC: 19 MMOL/L — LOW (ref 22–31)
CREAT SERPL-MCNC: 1.83 MG/DL — HIGH (ref 0.5–1.3)
CRP SERPL-MCNC: 5.29 MG/DL — HIGH (ref 0–0.4)
D DIMER BLD IA.RAPID-MCNC: 2539 NG/ML DDU — HIGH
EOSINOPHIL # BLD AUTO: 0.13 K/UL — SIGNIFICANT CHANGE UP (ref 0–0.5)
EOSINOPHIL NFR BLD AUTO: 1.7 % — SIGNIFICANT CHANGE UP (ref 0–6)
FERRITIN SERPL-MCNC: 2914 NG/ML — HIGH (ref 30–400)
GIANT PLATELETS BLD QL SMEAR: PRESENT — SIGNIFICANT CHANGE UP
GLUCOSE SERPL-MCNC: 137 MG/DL — HIGH (ref 70–99)
HCT VFR BLD CALC: 40.9 % — SIGNIFICANT CHANGE UP (ref 39–50)
HGB BLD-MCNC: 13.2 G/DL — SIGNIFICANT CHANGE UP (ref 13–17)
LYMPHOCYTES # BLD AUTO: 0.07 K/UL — LOW (ref 1–3.3)
LYMPHOCYTES # BLD AUTO: 0.9 % — LOW (ref 13–44)
MAGNESIUM SERPL-MCNC: 2.4 MG/DL — SIGNIFICANT CHANGE UP (ref 1.6–2.6)
MANUAL SMEAR VERIFICATION: SIGNIFICANT CHANGE UP
MCHC RBC-ENTMCNC: 31.6 PG — SIGNIFICANT CHANGE UP (ref 27–34)
MCHC RBC-ENTMCNC: 32.3 GM/DL — SIGNIFICANT CHANGE UP (ref 32–36)
MCV RBC AUTO: 97.8 FL — SIGNIFICANT CHANGE UP (ref 80–100)
METAMYELOCYTES # FLD: 2.6 % — HIGH (ref 0–0)
MONOCYTES # BLD AUTO: 0.55 K/UL — SIGNIFICANT CHANGE UP (ref 0–0.9)
MONOCYTES NFR BLD AUTO: 7 % — SIGNIFICANT CHANGE UP (ref 2–14)
MYELOCYTES NFR BLD: 2.6 % — HIGH (ref 0–0)
NEUTROPHILS # BLD AUTO: 6.71 K/UL — SIGNIFICANT CHANGE UP (ref 1.8–7.4)
NEUTROPHILS NFR BLD AUTO: 83.5 % — HIGH (ref 43–77)
NEUTS BAND # BLD: 1.7 % — SIGNIFICANT CHANGE UP (ref 0–8)
OVALOCYTES BLD QL SMEAR: SLIGHT — SIGNIFICANT CHANGE UP
PHOSPHATE SERPL-MCNC: 3.9 MG/DL — SIGNIFICANT CHANGE UP (ref 2.5–4.5)
PLAT MORPH BLD: NORMAL — SIGNIFICANT CHANGE UP
PLATELET # BLD AUTO: 305 K/UL — SIGNIFICANT CHANGE UP (ref 150–400)
POLYCHROMASIA BLD QL SMEAR: SLIGHT — SIGNIFICANT CHANGE UP
POTASSIUM SERPL-MCNC: 5 MMOL/L — SIGNIFICANT CHANGE UP (ref 3.5–5.3)
POTASSIUM SERPL-SCNC: 5 MMOL/L — SIGNIFICANT CHANGE UP (ref 3.5–5.3)
PROT SERPL-MCNC: 6.3 G/DL — SIGNIFICANT CHANGE UP (ref 6–8.3)
RBC # BLD: 4.18 M/UL — LOW (ref 4.2–5.8)
RBC # FLD: 14 % — SIGNIFICANT CHANGE UP (ref 10.3–14.5)
RBC BLD AUTO: ABNORMAL
SARS-COV-2 RNA SPEC QL NAA+PROBE: POSITIVE
SODIUM SERPL-SCNC: 135 MMOL/L — SIGNIFICANT CHANGE UP (ref 135–145)
WBC # BLD: 7.88 K/UL — SIGNIFICANT CHANGE UP (ref 3.8–10.5)
WBC # FLD AUTO: 7.88 K/UL — SIGNIFICANT CHANGE UP (ref 3.8–10.5)

## 2021-02-22 PROCEDURE — 99232 SBSQ HOSP IP/OBS MODERATE 35: CPT | Mod: CS

## 2021-02-22 RX ORDER — THIAMINE MONONITRATE (VIT B1) 100 MG
100 TABLET ORAL DAILY
Refills: 0 | Status: DISCONTINUED | OUTPATIENT
Start: 2021-02-22 | End: 2021-02-23

## 2021-02-22 RX ADMIN — Medication 1 MILLIGRAM(S): at 12:01

## 2021-02-22 RX ADMIN — Medication 25 MILLIGRAM(S): at 12:01

## 2021-02-22 RX ADMIN — Medication 100 MILLIGRAM(S): at 19:24

## 2021-02-22 RX ADMIN — PANTOPRAZOLE SODIUM 40 MILLIGRAM(S): 20 TABLET, DELAYED RELEASE ORAL at 06:23

## 2021-02-22 RX ADMIN — ZINC SULFATE TAB 220 MG (50 MG ZINC EQUIVALENT) 220 MILLIGRAM(S): 220 (50 ZN) TAB at 12:01

## 2021-02-22 RX ADMIN — Medication 6 MILLIGRAM(S): at 22:53

## 2021-02-22 RX ADMIN — APIXABAN 2.5 MILLIGRAM(S): 2.5 TABLET, FILM COATED ORAL at 22:53

## 2021-02-22 RX ADMIN — PRIMIDONE 50 MILLIGRAM(S): 250 TABLET ORAL at 12:01

## 2021-02-22 RX ADMIN — CHLORHEXIDINE GLUCONATE 1 APPLICATION(S): 213 SOLUTION TOPICAL at 06:22

## 2021-02-22 RX ADMIN — APIXABAN 2.5 MILLIGRAM(S): 2.5 TABLET, FILM COATED ORAL at 12:01

## 2021-02-22 RX ADMIN — Medication 1 TABLET(S): at 12:01

## 2021-02-22 RX ADMIN — Medication 6 MILLIGRAM(S): at 01:13

## 2021-02-22 RX ADMIN — Medication 25 MILLIGRAM(S): at 22:53

## 2021-02-22 NOTE — PROGRESS NOTE ADULT - PROBLEM SELECTOR PROBLEM 5
Essential tremor
Acute on chronic kidney failure
Chronic pulmonary embolism, unspecified pulmonary embolism type, unspecified whether acute cor pulmonale present
Acute on chronic kidney failure
Essential tremor
Acute on chronic kidney failure
Chronic pulmonary embolism, unspecified pulmonary embolism type, unspecified whether acute cor pulmonale present
Acute on chronic kidney failure

## 2021-02-22 NOTE — PROGRESS NOTE ADULT - PROBLEM SELECTOR PLAN 1
COVID PCR positive (2/13/21), Decadron started 2/15 for 10 days.   -CXR w/ diffuse infiltrates b/l, moderately improved 2/19  -c/w Decadron 2/15-2/24  -No remdesivir 2/2 decreased renal function  -O2 requirements: 95% on 2L nc, will titrate as tolerated  -monitor COVID markers    #Non-AG Metabolic acidosis - bicarb downtrending, renal consult, ordered for ABG. Likely 2/2 renal insufficiency.     #Weakness  Deconditioned, Ordered for PT COVID PCR positive (2/13/21), Decadron started 2/15 for 10 days.   -CXR w/ diffuse infiltrates b/l, moderately improved 2/19  -c/w Decadron 2/15-2/24  -No remdesivir 2/2 decreased renal function  -O2 requirements: 95% on 2L nc, will titrate as tolerated  -monitor COVID markers    #Non-AG Metabolic acidosis-Resolved    #Weakness  Deconditioned, Ordered for PT COVID PCR positive (2/13/21), Decadron started 2/15 for 10 days.   -CXR w/ diffuse infiltrates b/l, moderately improved 2/19  -c/w Decadron 2/15-2/24  -No remdesivir 2/2 decreased renal function  -O2 requirements: 95% on 2L nc, will titrate as tolerated  -monitor COVID markers  - Pt retested today for [facility] placement, COVID positive 2/22, limiting placement to 2 already accepted facilities. Pt's family determining placement.    #Weakness  Deconditioned during admission, pt evaluated by PT who recommend [facility]  - Pt would prefer home with HHA and home PT, however family is opting for placement at [facility]

## 2021-02-22 NOTE — PROGRESS NOTE ADULT - ASSESSMENT
per Internal Medicine    84 yo M PMH b/l PE (on xarelto), mitral valve prolapse, and CKD (reports baseline GFR 30) who presented to [facility] ED c/o 5d of NBNB diarrhea, dehydration, weakness, myalgias, dry cough, and worsening of his baseline tremor, and intermittent fevers (Tmax 102F at home), found to be COVID-19+, BERTRAND on CKD. Transferred to 2WO due to worsening sepsis then stepped up to 3WO 2/15 for AFib RVR, stepped down to 2WO 2/16, stabilized and further stepped down to RMF 2/18, now with HC c/b transaminitis, improving.      Problem/Plan - 1:  ·  Problem: Pneumonia due to COVID-19 virus.  Plan: COVID PCR positive (2/13/21), Decadron started 2/15 for 10 days.   -CXR w/ diffuse infiltrates b/l, moderately improved 2/19  -c/w Decadron 2/15-2/24  -No remdesivir 2/2 decreased renal function  -O2 requirements: 95% on 2L nc, will titrate as tolerated  -monitor COVID markers    #Non-AG Metabolic acidosis - bicarb downtrending, renal consult, ordered for ABG. Likely 2/2 renal insufficiency.     #Weakness  Deconditioned, Ordered for PT.     Problem/Plan - 2:  ·  Problem: Bacterial pneumonia.  Plan: s/p treatment. Febrile to 102 in ED, w/ significantly elevated procal 67.29. CXR w/o focal consolidation, though crackles bilaterally. s/p 5 days of Zosyn   -no additional abxs at this time.     Problem/Plan - 3:  ·  Problem: Elevated d-dimer.  Plan: Pt with elevated d-dimer today 2011, similar to 2/19. Pt w/out calf tenderness, no edema or asymmetry of the extremities however reported some pain in LEs, no chest pain, no increased 02 requirements. Currently on Eliquis 2.5 BID however subtherapeutic dosing for treatment of VTE if present.   - Ordered for Dopplers to assess for DVT, f/u  - continue to monitor D-dimer  - If patient with increasing 02 requirements, will obtain CT PE protocol to r/o for PE.     Problem/Plan - 4:  ·  Problem: Transaminitis.  Plan: Improving. Pt w/ elevated ALT/AST and Alk Phos, Hep panel negative, CT abdomen revealing no identifiable source to explain transaminitis.  -Continue to monitor liver enzymes with daily labs, if uptrending will consult GI.     Problem/Plan - 5:  ·  Problem: Acute on chronic kidney failure.  Plan: Pt reports hx of renal failure with baseline GFR ~30. Follows with Dr. Jamil Tee (nephrology) and Dr. Ishaan Gaming (urology). On admission, found to have Cr 2.93 in setting of decreased PO intake, fevers, covid-19. Cr function did not improve with fluid boluses. CK 1585. likely intrinsic process possibly 2/2 COVID vs rhabdomyolysis. Previously was retaining and had condon, no longer retaining or with condon.   -Monitor with daily BMP, CK,   -Avoid nephrotoxic medications.  -Likely at new baseline for kidney function   -f/u renal US  -Continue to monitor I/Os (With condom cath).     Problem/Plan - 6:  Problem: Afib. Plan: No known hx AFib. Had 16sec of PAT with HR 150s around 2am 2/15. Also noted to have rapid afib in HR 150s. S/p amio IV and on lopressor 50 BID, HR now persistently borderline bradycardic NSR  - decreased Metoprolol from 50 mg BID to 25 mg BID 2/20 given low HRs yesterday (2/20). Continue.  - c/w renally adjusted Eliquis 2.5 mg BID for AC  - Echo 2/17 showed normal EF, normal systolic function, with RV dilation  - Card recs switch to toprol 100 mg daily on discharge and f/u w/ Dr Velarde  -Will f/u with Cards re: HR goals    #Prolonged QTc  QTc 502 on admission, no hx of QTc-prolonging medications  -careful with QTc prolonging meds.    Problem/Plan - 7:  ·  Problem: Essential tremor.  Plan: Essential tremor. Plan: Tremor for >10yrs, worse with intention. Self-medicates with vodka, he takes 2 shots of vodka every morning one martini at night with improvement of his symptoms. There has been no increase in the amount of vodka he drinks during acute worsening of tremor. Worsening likely 2/2 acute infection and dehydration.   -Pt independent in ADLs at baseline, PT recommending KAIDEN, however daughter requesting assessment for home health aid after discharge.  -social work on board given EtOH history  -c/w ativan 1mg q8 prn for treatment of tremors  - c/w primidone for tremor      #Alcohol use  Drinks as above. CIWAs max 8, previously.  -Ativan 1mg PRN for CIWA >8  -CIWA protocol, monitor for signs of alcohol withdrawal. Has not had elevated CIWAs on 2WO. Receiving ativan only for tremor.     Problem/Plan - 8:  ·  Problem: Hypertension.  Plan: SBPs ranging from 130-150s, Home medication: losartan 50mg qd  -continue holding Losartan in setting of BERTRAND   -If persistently elevated overnight, will start Amlodipine 5 mg daily tomorrow, 2/21.     Problem/Plan - 9:  ·  Problem: Chronic pulmonary embolism, unspecified pulmonary embolism type, unspecified whether acute cor pulmonale present.  Plan: With hx of PE, per daughter ~5yrs ago, provoked 1st time after hernia surgery, s/p xarelto x few months, then unprovoked in 2016 off xarelto after 3 months, then resumed Xarelto. On xarelto 10mg qd outpatient indefinitely for tx of PE. No hx of malignancy, immobilization, or other risk factors, likely unprovoked.   - c/w eliquis 2.5 BID 2/16.     Problem/Plan - 10:  Problem: Nutrition, metabolism, and development symptoms. Plan; F: No IVF indicated, encourage PO intake  E: replete to K>4, Mg>2, Phos>2.5  N: regular diet, Ensure Enlive BID  Ppx: apixaban 2.5mg  BID (renally adjusted, indication: Afib)  Code Status: full code  Dispo: COVID - RMF, PT recommending KAIDEN.

## 2021-02-22 NOTE — PROGRESS NOTE ADULT - PROBLEM SELECTOR PLAN 6
No known hx AFib. Had 16sec of PAT with HR 150s around 2am 2/15. Also noted to have rapid afib in HR 150s. S/p amio IV and on lopressor 50 BID, HR now persistently borderline bradycardic NSR  - decreased Metoprolol from 50 mg BID to 25 mg BID 2/20 given low HRs yesterday (2/20). Continue.  - c/w renally adjusted Eliquis 2.5 mg BID for AC  - Echo 2/17 showed normal EF, normal systolic function, with RV dilation  - Card recs switch to toprol 100 mg daily on discharge and f/u w/ Dr Velarde  -Will f/u with Cards re: HR goals    #Prolonged QTc  QTc 502 on admission, no hx of QTc-prolonging medications  -careful with QTc prolonging meds.

## 2021-02-22 NOTE — PROGRESS NOTE ADULT - ATTENDING COMMENTS
Patient was seen and examined independently. I reviewed the chart, labs and vitals. I discussed the case, assessment and plan with the resident and intern. The plan above was agreed upon on our rounds and I agree with it. Patient is supposed to be discharged today but he stated that he feels rushed. Will discharge in am to rehab.

## 2021-02-22 NOTE — PROGRESS NOTE ADULT - PROBLEM SELECTOR PROBLEM 4
Sepsis
Afib
Chronic pulmonary embolism, unspecified pulmonary embolism type, unspecified whether acute cor pulmonale present
Bacterial pneumonia
Transaminitis
Afib
Transaminitis
Transaminitis
Bacterial pneumonia
Afib
Transaminitis

## 2021-02-22 NOTE — PROGRESS NOTE ADULT - PROBLEM SELECTOR PROBLEM 1
Pneumonia due to COVID-19 virus
Pneumonia due to COVID-19 virus
Acute on chronic kidney failure
Pneumonia due to COVID-19 virus
Pneumonia due to COVID-19 virus
Sepsis
Pneumonia due to COVID-19 virus
Pneumonia due to COVID-19 virus
Sepsis
Pneumonia due to COVID-19 virus
Pneumonia due to COVID-19 virus

## 2021-02-22 NOTE — PROGRESS NOTE ADULT - PROBLEM SELECTOR PLAN 7
Essential tremor. Plan: Tremor for >10yrs, worse with intention. Self-medicates with vodka, he takes 2 shots of vodka every morning one martini at night with improvement of his symptoms. There has been no increase in the amount of vodka he drinks during acute worsening of tremor. Worsening likely 2/2 acute infection and dehydration.   -Pt independent in ADLs at baseline, PT recommending KAIDEN, however daughter requesting assessment for home health aid after discharge.  -social work on board given EtOH history  -c/w ativan 1mg q8 prn for treatment of tremors  - c/w primidone for tremor      #Alcohol use  Drinks as above. CIWAs max 8, previously.  -Ativan 1mg PRN for CIWA >8  -CIWA protocol, monitor for signs of alcohol withdrawal. Has not had elevated CIWAs on 2WO. Receiving ativan only for tremor. Essential tremor. Plan: Tremor for >10yrs, worse with intention. Self-medicates with vodka, he takes 2 shots of vodka every morning one martini at night with improvement of his symptoms. There has been no increase in the amount of vodka he drinks during acute worsening of tremor. Worsening likely 2/2 acute infection and dehydration.   -Pt independent in ADLs at baseline, PT recommending KAIDEN, however daughter requesting assessment for home health aid after discharge.  -social work on board given EtOH history  -c/w ativan 1mg q8 prn for treatment of tremors  - c/w primidone for tremor  - Pt retested COVID po 2/22, so limited in KAIDEN choices    #Alcohol use  Drinks as above. CIWAs max 8, previously.  -Ativan 1mg PRN for CIWA >8  -CIWA protocol, monitor for signs of alcohol withdrawal. Has not had elevated CIWAs. Receiving ativan only for tremor.

## 2021-02-22 NOTE — PROGRESS NOTE ADULT - SUBJECTIVE AND OBJECTIVE BOX
***INCOMPLETE****  INTERVAL HPI/OVERNIGHT EVENTS:      On further ROS, patient did not have complaint of: Headache, Blurred Vision, Cough, Dyspnea, Palpitations, Abdominal Pain, N/V, Weakness, Change in Sensation.     VITAL SIGNS:  T(F): 97 (21 @ 09:43)  HR: 61 (21 @ 11:58)  BP: 116/71 (21 @ 11:58)  RR: 18 (21 @ 11:58)  SpO2: 95% (21 @ 11:58)  Wt(kg): --    Input & Output:    21 @ 07:01  -  21 @ 07:00  --------------------------------------------------------  IN: 0 mL / OUT: 650 mL / NET: -650 mL    21 @ 07:01  -  21 @ 12:54  --------------------------------------------------------  IN: 0 mL / OUT: 400 mL / NET: -400 mL        PHYSICAL EXAM:  General: Comfortable, pleasant/anxious/agitated, Ill-appearing, well-nourished/frail/cachectic, comfortable / in distress  Neurological: AAOx3, no focal deficits  HEENT: NC/AT; EOMI, PERRL, clear conjunctiva, no nasal or oropharyngeal discharge or exudates, MMM  Neck: supple, no cervical or post-auricular lymphadenopathy  Cardiovascular: +S1/S2, no murmurs/rubs/gallops, RRR  Respiratory: CTA B/L, no diminished breath sounds, no wheezes/rales/rhonchi, no increased work of breathing or accessory muscle use  Gastrointestinal: soft, NT/ND; active BSx4 quadrants  Genitourinary: no suprapubic tenderness, no CVA tenderness  Extremities: WWP; no edema, clubbing or cyanosis  Vascular: 2+ radial, DP/PT pulses B/L  Skin: no rashes  Lines/Drains:     MEDICATIONS  (STANDING):  apixaban 2.5 milliGRAM(s) Oral every 12 hours  chlorhexidine 2% Cloths 1 Application(s) Topical <User Schedule>  dexAMETHasone  Injectable 6 milliGRAM(s) IV Push every 24 hours  folic acid 1 milliGRAM(s) Oral daily  metoprolol succinate ER 25 milliGRAM(s) Oral every 12 hours  multivitamin 1 Tablet(s) Oral daily  pantoprazole    Tablet 40 milliGRAM(s) Oral before breakfast  primidone 50 milliGRAM(s) Oral daily  zinc sulfate 220 milliGRAM(s) Oral daily    MEDICATIONS  (PRN):  acetaminophen   Tablet .. 650 milliGRAM(s) Oral every 4 hours PRN Temp greater or equal to 38C (100.4F), Mild Pain (1 - 3)  LORazepam   Injectable 1 milliGRAM(s) IV Push every 1 hour PRN CIWA-Ar score 8 or greater      Allergies    No Known Allergies    Intolerances        LABS:                        13.2   7.88  )-----------( 305      ( 2021 06:42 )             40.9     -    135  |  105  |  52<H>  ----------------------------<  137<H>  5.0   |  19<L>  |  1.83<H>    Ca    8.4      2021 06:42  Phos  3.9       Mg     2.4         TPro  6.3  /  Alb  3.0<L>  /  TBili  0.7  /  DBili  x   /  AST  59<H>  /  ALT  165<H>  /  AlkPhos  366<H>        Urinalysis Basic - ( 2021 14:03 )    Color: Yellow / Appearance: Clear / S.025 / pH: x  Gluc: x / Ketone: NEGATIVE  / Bili: Negative / Urobili: 0.2 E.U./dL   Blood: x / Protein: Trace mg/dL / Nitrite: NEGATIVE   Leuk Esterase: NEGATIVE / RBC: < 5 /HPF / WBC < 5 /HPF   Sq Epi: x / Non Sq Epi: 0-5 /HPF / Bacteria: Present /HPF        RADIOLOGY & ADDITIONAL TESTS:   ***INCOMPLETE****  INTERVAL HPI/OVERNIGHT EVENTS:  SUSI o/n, on exam this AM pt with slight bibasilar crackles posteriorly up to the mid lung fields.    On further ROS, patient did not have complaint of: Headache, Blurred Vision, Cough, Dyspnea, Palpitations, Abdominal Pain, N/V, Weakness, Change in Sensation.     VITAL SIGNS:  T(F): 97 (21 @ 09:43)  HR: 61 (21 @ 11:58)  BP: 116/71 (21 @ 11:58)  RR: 18 (21 @ 11:58)  SpO2: 95% (21 @ 11:58)  Wt(kg): --    Input & Output:    21 @ 07:01  -  21 @ 07:00  --------------------------------------------------------  IN: 0 mL / OUT: 650 mL / NET: -650 mL    21 @ 07:01  -  21 @ 12:54  --------------------------------------------------------  IN: 0 mL / OUT: 400 mL / NET: -400 mL        PHYSICAL EXAM:  General: WDWN M in NAD male  HEENT: NC/AT; EOMI, PERRLA, anicteric sclera; with mask on, on 2L NC  Neck: supple, trachea midline  Cardiovascular: RRR, +S1/S2; NO M/R/G  Respiratory: Bibasilar crackles posteriorly up to the mid lung fields; no accessory muscle use or increased work of breathing  Gastrointestinal: soft, non-tender, distended but soft, +BSx4  Extremities: WWP; LE edematous 2/2 body habitus, no pitting edema or cyanosis  Vascular: 2+ radial, DP/PT pulses B/L  Neurological: AAOx3; no focal deficits    MEDICATIONS  (STANDING):  apixaban 2.5 milliGRAM(s) Oral every 12 hours  chlorhexidine 2% Cloths 1 Application(s) Topical <User Schedule>  dexAMETHasone  Injectable 6 milliGRAM(s) IV Push every 24 hours  folic acid 1 milliGRAM(s) Oral daily  metoprolol succinate ER 25 milliGRAM(s) Oral every 12 hours  multivitamin 1 Tablet(s) Oral daily  pantoprazole    Tablet 40 milliGRAM(s) Oral before breakfast  primidone 50 milliGRAM(s) Oral daily  zinc sulfate 220 milliGRAM(s) Oral daily    MEDICATIONS  (PRN):  acetaminophen   Tablet .. 650 milliGRAM(s) Oral every 4 hours PRN Temp greater or equal to 38C (100.4F), Mild Pain (1 - 3)  LORazepam   Injectable 1 milliGRAM(s) IV Push every 1 hour PRN CIWA-Ar score 8 or greater      Allergies    No Known Allergies    Intolerances        LABS:                        13.2   7.88  )-----------( 305      ( 2021 06:42 )             40.9         135  |  105  |  52<H>  ----------------------------<  137<H>  5.0   |  19<L>  |  1.83<H>    Ca    8.4      2021 06:42  Phos  3.9       Mg     2.4         TPro  6.3  /  Alb  3.0<L>  /  TBili  0.7  /  DBili  x   /  AST  59<H>  /  ALT  165<H>  /  AlkPhos  366<H>        Urinalysis Basic - ( 2021 14:03 )    Color: Yellow / Appearance: Clear / S.025 / pH: x  Gluc: x / Ketone: NEGATIVE  / Bili: Negative / Urobili: 0.2 E.U./dL   Blood: x / Protein: Trace mg/dL / Nitrite: NEGATIVE   Leuk Esterase: NEGATIVE / RBC: < 5 /HPF / WBC < 5 /HPF   Sq Epi: x / Non Sq Epi: 0-5 /HPF / Bacteria: Present /HPF        RADIOLOGY & ADDITIONAL TESTS:   INTERVAL HPI/OVERNIGHT EVENTS:  SUSI o/n, on exam this AM pt with slight bibasilar crackles posteriorly up to the mid lung fields. Pt waiting for family to agree on KAIDEN placement.    On further ROS, patient did not have complaint of: Headache, Blurred Vision, Cough, Dyspnea, Palpitations, Abdominal Pain, N/V, Weakness, Change in Sensation.     VITAL SIGNS:  T(F): 97 (21 @ 09:43)  HR: 61 (21 @ 11:58)  BP: 116/71 (21 @ 11:58)  RR: 18 (21 @ 11:58)  SpO2: 95% (21 @ 11:58)  Wt(kg): --    Input & Output:    21 @ 07:01  -  21 @ 07:00  --------------------------------------------------------  IN: 0 mL / OUT: 650 mL / NET: -650 mL    21 @ 07:01  -  21 @ 12:54  --------------------------------------------------------  IN: 0 mL / OUT: 400 mL / NET: -400 mL        PHYSICAL EXAM:  General: WDWN M in NAD male  HEENT: NC/AT; EOMI, PERRLA, anicteric sclera; with mask on, on 2L NC  Neck: supple, trachea midline  Cardiovascular: RRR, +S1/S2; NO M/R/G  Respiratory: Bibasilar crackles posteriorly up to the mid lung fields; no accessory muscle use or increased work of breathing  Gastrointestinal: soft, non-tender, distended but soft, +BSx4  Extremities: WWP; LE edematous 2/2 body habitus, no pitting edema or cyanosis  Vascular: 2+ radial, DP/PT pulses B/L  Neurological: AAOx3; no focal deficits    MEDICATIONS  (STANDING):  apixaban 2.5 milliGRAM(s) Oral every 12 hours  chlorhexidine 2% Cloths 1 Application(s) Topical <User Schedule>  dexAMETHasone  Injectable 6 milliGRAM(s) IV Push every 24 hours  folic acid 1 milliGRAM(s) Oral daily  metoprolol succinate ER 25 milliGRAM(s) Oral every 12 hours  multivitamin 1 Tablet(s) Oral daily  pantoprazole    Tablet 40 milliGRAM(s) Oral before breakfast  primidone 50 milliGRAM(s) Oral daily  zinc sulfate 220 milliGRAM(s) Oral daily    MEDICATIONS  (PRN):  acetaminophen   Tablet .. 650 milliGRAM(s) Oral every 4 hours PRN Temp greater or equal to 38C (100.4F), Mild Pain (1 - 3)  LORazepam   Injectable 1 milliGRAM(s) IV Push every 1 hour PRN CIWA-Ar score 8 or greater      Allergies    No Known Allergies    Intolerances        LABS:                        13.2   7.88  )-----------( 305      ( 2021 06:42 )             40.9         135  |  105  |  52<H>  ----------------------------<  137<H>  5.0   |  19<L>  |  1.83<H>    Ca    8.4      2021 06:42  Phos  3.9       Mg     2.4         TPro  6.3  /  Alb  3.0<L>  /  TBili  0.7  /  DBili  x   /  AST  59<H>  /  ALT  165<H>  /  AlkPhos  366<H>        Urinalysis Basic - ( 2021 14:03 )    Color: Yellow / Appearance: Clear / S.025 / pH: x  Gluc: x / Ketone: NEGATIVE  / Bili: Negative / Urobili: 0.2 E.U./dL   Blood: x / Protein: Trace mg/dL / Nitrite: NEGATIVE   Leuk Esterase: NEGATIVE / RBC: < 5 /HPF / WBC < 5 /HPF   Sq Epi: x / Non Sq Epi: 0-5 /HPF / Bacteria: Present /HPF        RADIOLOGY & ADDITIONAL TESTS:

## 2021-02-22 NOTE — PROGRESS NOTE ADULT - SUBJECTIVE AND OBJECTIVE BOX
Physical Medicine and Rehabilitation Progress Note:    Patient is a 85y old  Male who presents with a chief complaint of alvin (22 Feb 2021 12:53)      HPI:  85M w/ PMH of b/l PE (on xarelto), mitral valve prolapse, and CKD (reports baseline GFR 30) who presents to [facility] ED c/o 5d of NBNB diarrhea, dehydration, weakness, myalgias, dry cough, and worsening of his baseline tremor. He has also had intermittent fevers with Tmax 102 at home. He denies recent travel or sick contacts. ROS negative for chest pain or palpitations, shortness of breath, abd pain.    He has had a baseline tremor for over a decade, worse with intention. He has been to neurologists for evaluation, no underlying etiology identified. He self-medicates with vodka, he takes 2 shots of vodka every morning one martini at night with improvement of his symptoms. There has been no increase in the amount of vodka he drinks during acute worsening of tremor. At baseline pt is independent in ADLS, lives alone. Per daughter, very dehydrated. Noticed that his tremors were worse in the AM when waking up.     In the ED,  Vitals: T98.1, HR 85, /64, RR 17, O2sat 95% on room air  Labs: Na 133, HCO3 18, BUN 54, Cr 2.93 | trop T 0.02, BNP 3014 | lactate 3.1    CRP 33.21, ferritin 1853 | procalcitonin 67.29    UA: pending    VBG: pH 7.36, pCO2 36  Imaging:    EKG: LAD, HR 85, bifasciular block (LAFB + RBBB)    CXR: diffuse infiltrates bilaterally  Interventions: ceftriaxone 1g, azithromycin 500mg, 1L IV NS. Repeat lactate after fluids 1.7.    (13 Feb 2021 20:25)                            13.2   7.88  )-----------( 305      ( 22 Feb 2021 06:42 )             40.9       02-22    135  |  105  |  52<H>  ----------------------------<  137<H>  5.0   |  19<L>  |  1.83<H>    Ca    8.4      22 Feb 2021 06:42  Phos  3.9     02-22  Mg     2.4     02-22    TPro  6.3  /  Alb  3.0<L>  /  TBili  0.7  /  DBili  x   /  AST  59<H>  /  ALT  165<H>  /  AlkPhos  366<H>  02-22    Vital Signs Last 24 Hrs  T(C): 36.1 (22 Feb 2021 09:43), Max: 36.3 (21 Feb 2021 21:12)  T(F): 97 (22 Feb 2021 09:43), Max: 97.3 (21 Feb 2021 21:12)  HR: 61 (22 Feb 2021 11:58) (55 - 77)  BP: 116/71 (22 Feb 2021 11:58) (116/71 - 144/81)  BP(mean): --  RR: 18 (22 Feb 2021 11:58) (16 - 18)  SpO2: 95% (22 Feb 2021 11:58) (86% - 97%)    MEDICATIONS  (STANDING):  apixaban 2.5 milliGRAM(s) Oral every 12 hours  chlorhexidine 2% Cloths 1 Application(s) Topical <User Schedule>  dexAMETHasone  Injectable 6 milliGRAM(s) IV Push every 24 hours  folic acid 1 milliGRAM(s) Oral daily  metoprolol succinate ER 25 milliGRAM(s) Oral every 12 hours  multivitamin 1 Tablet(s) Oral daily  pantoprazole    Tablet 40 milliGRAM(s) Oral before breakfast  primidone 50 milliGRAM(s) Oral daily  zinc sulfate 220 milliGRAM(s) Oral daily    MEDICATIONS  (PRN):  acetaminophen   Tablet .. 650 milliGRAM(s) Oral every 4 hours PRN Temp greater or equal to 38C (100.4F), Mild Pain (1 - 3)  LORazepam   Injectable 1 milliGRAM(s) IV Push every 1 hour PRN CIWA-Ar score 8 or greater    Currently Undergoing Physical/ Occupational Therapy at bedside.    Functional Status Assessment:   2/21/2021    Therapeutic Interventions    Bed Mobility  Bed Mobility Training Rehab Potential: good, to achieve stated therapy goals  Bed Mobility Training Symptoms Noted During/After Treatment: fatigue  Bed Mobility Training Rolling/Turning: supervision;  supervision  Bed Mobility Training Scooting: supervision;  supervision  Bed Mobility Training Bridging: supervision;  supervision  Bed Mobility Training Sit-to-Supine: supervision;  supervision;  from flat be (+) handrails;  bed rails  Bed Mobility Training Supine-to-Sit: contact guard;  1 person assist;  bed rails;  elevated HOB  Bed Mobility Training Limitations: decreased strength;  impaired balance;  impaired motor control;  impaired postural control    Sit-Stand Transfer Training  Sit-to-Stand Transfer Training Rehab Potential: good, to achieve stated therapy goals  Sit-to-Stand Transfer Training Symptoms Noted During/After Treatment: fatigue;  shortness of breath  Transfer Training Sit-to-Stand Transfer: moderate assist (50% patient effort);  1 person assist;  full weight-bearing   HHA 5 rep for therex mod-max  Transfer Training Stand-to-Sit Transfer: 1 person assist;  minimum assist (75% patient effort);  full weight-bearing   HHA  Sit-to-Stand Transfer Training Transfer Safety Analysis: decreased balance;  decreased strength;  impaired balance;  impaired coordination;  impaired motor control;  impaired postural control    Gait Training  Gait Training Rehab Potential: fair, will monitor progress closely  Gait Training Symptoms Noted During/After Treatment: fatigue;  shortness of breath  Gait Training: minimum assist (75% patient effort);  1 person assist;  HHA reaching out for support;  full weight-bearing   (+) HHA, walking back with hand leaning on upright of chair;  20 ft x 2  Gait Analysis: 3-point gait   decreased luma;  crouch;  decreased step length;  decreased stride length;  shuffling;  decreased strength;  impaired balance;  impaired motor control;  impaired postural control;  impaired coordination;  HHA    Therapeutic Exercise  Therapeutic Exercise Detail: sit-stands x 5, seated LAQ, seated hip flexion x 5 each, seated clasped hands overhead reviewing diaphragmatic breathing and energy conservation.           PM&R Impression: as above    Current Disposition Plan Recommendations:    subacute rehab placement

## 2021-02-23 ENCOUNTER — TRANSCRIPTION ENCOUNTER (OUTPATIENT)
Age: 86
End: 2021-02-23

## 2021-02-23 VITALS
HEART RATE: 56 BPM | RESPIRATION RATE: 17 BRPM | SYSTOLIC BLOOD PRESSURE: 146 MMHG | TEMPERATURE: 98 F | OXYGEN SATURATION: 97 % | DIASTOLIC BLOOD PRESSURE: 87 MMHG

## 2021-02-23 PROCEDURE — 99238 HOSP IP/OBS DSCHRG MGMT 30/<: CPT | Mod: CS

## 2021-02-23 RX ORDER — PRIMIDONE 250 MG/1
1 TABLET ORAL
Qty: 30 | Refills: 0
Start: 2021-02-23 | End: 2021-03-24

## 2021-02-23 RX ORDER — RIVAROXABAN 15 MG-20MG
1 KIT ORAL
Qty: 0 | Refills: 0 | DISCHARGE

## 2021-02-23 RX ORDER — METOPROLOL TARTRATE 50 MG
1 TABLET ORAL
Qty: 0 | Refills: 0 | DISCHARGE
Start: 2021-02-23

## 2021-02-23 RX ORDER — APIXABAN 2.5 MG/1
1 TABLET, FILM COATED ORAL
Qty: 0 | Refills: 0 | DISCHARGE
Start: 2021-02-23

## 2021-02-23 RX ORDER — APIXABAN 2.5 MG/1
1 TABLET, FILM COATED ORAL
Qty: 60 | Refills: 0
Start: 2021-02-23 | End: 2021-03-24

## 2021-02-23 RX ORDER — THIAMINE MONONITRATE (VIT B1) 100 MG
1 TABLET ORAL
Qty: 0 | Refills: 0 | DISCHARGE
Start: 2021-02-23

## 2021-02-23 RX ORDER — ZINC SULFATE TAB 220 MG (50 MG ZINC EQUIVALENT) 220 (50 ZN) MG
1 TAB ORAL
Qty: 0 | Refills: 0 | DISCHARGE
Start: 2021-02-23

## 2021-02-23 RX ORDER — FOLIC ACID 0.8 MG
1 TABLET ORAL
Qty: 0 | Refills: 0 | DISCHARGE
Start: 2021-02-23

## 2021-02-23 RX ORDER — PRIMIDONE 250 MG/1
1 TABLET ORAL
Qty: 0 | Refills: 0 | DISCHARGE
Start: 2021-02-23

## 2021-02-23 RX ADMIN — PRIMIDONE 50 MILLIGRAM(S): 250 TABLET ORAL at 10:05

## 2021-02-23 RX ADMIN — Medication 1 MILLIGRAM(S): at 10:04

## 2021-02-23 RX ADMIN — Medication 1 TABLET(S): at 10:04

## 2021-02-23 RX ADMIN — ZINC SULFATE TAB 220 MG (50 MG ZINC EQUIVALENT) 220 MILLIGRAM(S): 220 (50 ZN) TAB at 10:05

## 2021-02-23 RX ADMIN — PANTOPRAZOLE SODIUM 40 MILLIGRAM(S): 20 TABLET, DELAYED RELEASE ORAL at 06:51

## 2021-02-23 RX ADMIN — Medication 100 MILLIGRAM(S): at 10:04

## 2021-02-23 RX ADMIN — APIXABAN 2.5 MILLIGRAM(S): 2.5 TABLET, FILM COATED ORAL at 10:04

## 2021-02-23 RX ADMIN — Medication 25 MILLIGRAM(S): at 10:04

## 2021-02-23 NOTE — DISCHARGE NOTE NURSING/CASE MANAGEMENT/SOCIAL WORK - PATIENT PORTAL LINK FT
You can access the FollowMyHealth Patient Portal offered by [facility] by registering at the following website: [email or web address]. By joining [facility]’s FollowMyHealth portal, you will also be able to view your health information using other applications (apps) compatible with our system.

## 2021-02-23 NOTE — DISCHARGE NOTE NURSING/CASE MANAGEMENT/SOCIAL WORK - NSDCPEPTCAREGIVEDUMATLIST _GEN_ALL_CORE
Impression: Primary open-angle glaucoma, bilateral, indeterminate stage: H40.1134. Plan: IOP stable OU. Cont. Latanoprost OU QHS and Timolol OU BID. Order OCT ON, OD severe thinning and OS borderline thinning. Influenza Vaccination/Coronavirus/COVID19

## 2021-02-24 RX ORDER — DEXAMETHASONE 0.5 MG/5ML
6 ELIXIR ORAL
Qty: 0 | Refills: 0 | DISCHARGE
Start: 2021-02-24 | End: 2021-02-24

## 2021-02-24 RX ORDER — DEXAMETHASONE 0.5 MG/5ML
1 ELIXIR ORAL
Qty: 1 | Refills: 0
Start: 2021-02-24 | End: 2021-02-24

## 2021-03-04 DIAGNOSIS — Z79.01 LONG TERM (CURRENT) USE OF ANTICOAGULANTS: ICD-10-CM

## 2021-03-04 DIAGNOSIS — G25.0 ESSENTIAL TREMOR: ICD-10-CM

## 2021-03-04 DIAGNOSIS — J12.82 PNEUMONIA DUE TO CORONAVIRUS DISEASE 2019: ICD-10-CM

## 2021-03-04 DIAGNOSIS — I24.8 OTHER FORMS OF ACUTE ISCHEMIC HEART DISEASE: ICD-10-CM

## 2021-03-04 DIAGNOSIS — R06.02 SHORTNESS OF BREATH: ICD-10-CM

## 2021-03-04 DIAGNOSIS — U07.1 COVID-19: ICD-10-CM

## 2021-03-04 DIAGNOSIS — N18.9 CHRONIC KIDNEY DISEASE, UNSPECIFIED: ICD-10-CM

## 2021-03-04 DIAGNOSIS — I48.91 UNSPECIFIED ATRIAL FIBRILLATION: ICD-10-CM

## 2021-03-04 DIAGNOSIS — J15.9 UNSPECIFIED BACTERIAL PNEUMONIA: ICD-10-CM

## 2021-03-04 DIAGNOSIS — I12.9 HYPERTENSIVE CHRONIC KIDNEY DISEASE WITH STAGE 1 THROUGH STAGE 4 CHRONIC KIDNEY DISEASE, OR UNSPECIFIED CHRONIC KIDNEY DISEASE: ICD-10-CM

## 2021-03-04 DIAGNOSIS — I34.1 NONRHEUMATIC MITRAL (VALVE) PROLAPSE: ICD-10-CM

## 2021-03-04 DIAGNOSIS — E87.4 MIXED DISORDER OF ACID-BASE BALANCE: ICD-10-CM

## 2021-03-04 DIAGNOSIS — A41.89 OTHER SPECIFIED SEPSIS: ICD-10-CM

## 2021-03-04 DIAGNOSIS — E87.1 HYPO-OSMOLALITY AND HYPONATREMIA: ICD-10-CM

## 2021-03-04 DIAGNOSIS — N17.9 ACUTE KIDNEY FAILURE, UNSPECIFIED: ICD-10-CM

## 2021-03-04 DIAGNOSIS — I45.2 BIFASCICULAR BLOCK: ICD-10-CM

## 2021-03-04 DIAGNOSIS — I27.82 CHRONIC PULMONARY EMBOLISM: ICD-10-CM

## 2021-03-04 DIAGNOSIS — Z72.89 OTHER PROBLEMS RELATED TO LIFESTYLE: ICD-10-CM

## 2021-03-04 DIAGNOSIS — I45.81 LONG QT SYNDROME: ICD-10-CM

## 2021-03-16 PROCEDURE — 80048 BASIC METABOLIC PNL TOTAL CA: CPT

## 2021-03-16 PROCEDURE — 84156 ASSAY OF PROTEIN URINE: CPT

## 2021-03-16 PROCEDURE — 84436 ASSAY OF TOTAL THYROXINE: CPT

## 2021-03-16 PROCEDURE — 36415 COLL VENOUS BLD VENIPUNCTURE: CPT

## 2021-03-16 PROCEDURE — 86709 HEPATITIS A IGM ANTIBODY: CPT

## 2021-03-16 PROCEDURE — 97116 GAIT TRAINING THERAPY: CPT

## 2021-03-16 PROCEDURE — 97110 THERAPEUTIC EXERCISES: CPT

## 2021-03-16 PROCEDURE — 82803 BLOOD GASES ANY COMBINATION: CPT

## 2021-03-16 PROCEDURE — 86705 HEP B CORE ANTIBODY IGM: CPT

## 2021-03-16 PROCEDURE — 84443 ASSAY THYROID STIM HORMONE: CPT

## 2021-03-16 PROCEDURE — 85379 FIBRIN DEGRADATION QUANT: CPT

## 2021-03-16 PROCEDURE — 85610 PROTHROMBIN TIME: CPT

## 2021-03-16 PROCEDURE — 93970 EXTREMITY STUDY: CPT

## 2021-03-16 PROCEDURE — 84300 ASSAY OF URINE SODIUM: CPT

## 2021-03-16 PROCEDURE — 93005 ELECTROCARDIOGRAM TRACING: CPT

## 2021-03-16 PROCEDURE — 82553 CREATINE MB FRACTION: CPT

## 2021-03-16 PROCEDURE — 86706 HEP B SURFACE ANTIBODY: CPT

## 2021-03-16 PROCEDURE — 83605 ASSAY OF LACTIC ACID: CPT

## 2021-03-16 PROCEDURE — 82550 ASSAY OF CK (CPK): CPT

## 2021-03-16 PROCEDURE — 83880 ASSAY OF NATRIURETIC PEPTIDE: CPT

## 2021-03-16 PROCEDURE — 93308 TTE F-UP OR LMTD: CPT

## 2021-03-16 PROCEDURE — 84132 ASSAY OF SERUM POTASSIUM: CPT

## 2021-03-16 PROCEDURE — 87340 HEPATITIS B SURFACE AG IA: CPT

## 2021-03-16 PROCEDURE — 86850 RBC ANTIBODY SCREEN: CPT

## 2021-03-16 PROCEDURE — 85730 THROMBOPLASTIN TIME PARTIAL: CPT

## 2021-03-16 PROCEDURE — 86803 HEPATITIS C AB TEST: CPT

## 2021-03-16 PROCEDURE — 87641 MR-STAPH DNA AMP PROBE: CPT

## 2021-03-16 PROCEDURE — 84540 ASSAY OF URINE/UREA-N: CPT

## 2021-03-16 PROCEDURE — 84100 ASSAY OF PHOSPHORUS: CPT

## 2021-03-16 PROCEDURE — 86704 HEP B CORE ANTIBODY TOTAL: CPT

## 2021-03-16 PROCEDURE — U0005: CPT

## 2021-03-16 PROCEDURE — 85027 COMPLETE CBC AUTOMATED: CPT

## 2021-03-16 PROCEDURE — 97161 PT EVAL LOW COMPLEX 20 MIN: CPT

## 2021-03-16 PROCEDURE — 96360 HYDRATION IV INFUSION INIT: CPT

## 2021-03-16 PROCEDURE — 99285 EMERGENCY DEPT VISIT HI MDM: CPT | Mod: 25

## 2021-03-16 PROCEDURE — 76770 US EXAM ABDO BACK WALL COMP: CPT

## 2021-03-16 PROCEDURE — 82330 ASSAY OF CALCIUM: CPT

## 2021-03-16 PROCEDURE — 84295 ASSAY OF SERUM SODIUM: CPT

## 2021-03-16 PROCEDURE — 85025 COMPLETE CBC W/AUTO DIFF WBC: CPT

## 2021-03-16 PROCEDURE — 87640 STAPH A DNA AMP PROBE: CPT

## 2021-03-16 PROCEDURE — 84145 PROCALCITONIN (PCT): CPT

## 2021-03-16 PROCEDURE — 84480 ASSAY TRIIODOTHYRONINE (T3): CPT

## 2021-03-16 PROCEDURE — 83735 ASSAY OF MAGNESIUM: CPT

## 2021-03-16 PROCEDURE — 80053 COMPREHEN METABOLIC PANEL: CPT

## 2021-03-16 PROCEDURE — 74176 CT ABD & PELVIS W/O CONTRAST: CPT

## 2021-03-16 PROCEDURE — 87040 BLOOD CULTURE FOR BACTERIA: CPT

## 2021-03-16 PROCEDURE — 82962 GLUCOSE BLOOD TEST: CPT

## 2021-03-16 PROCEDURE — 71045 X-RAY EXAM CHEST 1 VIEW: CPT

## 2021-03-16 PROCEDURE — 81001 URINALYSIS AUTO W/SCOPE: CPT

## 2021-03-16 PROCEDURE — 86900 BLOOD TYPING SEROLOGIC ABO: CPT

## 2021-03-16 PROCEDURE — 82570 ASSAY OF URINE CREATININE: CPT

## 2021-03-16 PROCEDURE — 82728 ASSAY OF FERRITIN: CPT

## 2021-03-16 PROCEDURE — 86140 C-REACTIVE PROTEIN: CPT

## 2021-03-16 PROCEDURE — 84484 ASSAY OF TROPONIN QUANT: CPT

## 2021-03-16 PROCEDURE — 86901 BLOOD TYPING SEROLOGIC RH(D): CPT

## 2021-03-16 PROCEDURE — U0003: CPT

## 2021-12-15 NOTE — DIETITIAN INITIAL EVALUATION ADULT. - CONTINUE CURRENT NUTRITION CARE PLAN
[place] grid at home. MVI/AREDS discussed. Patient to call if any changes in vision or grid card. yes

## 2022-03-04 PROBLEM — N18.9 CHRONIC KIDNEY DISEASE, UNSPECIFIED: Chronic | Status: ACTIVE | Noted: 2021-02-13

## 2022-03-06 ENCOUNTER — NON-APPOINTMENT (OUTPATIENT)
Age: 87
End: 2022-03-06

## 2022-03-07 ENCOUNTER — APPOINTMENT (OUTPATIENT)
Dept: PULMONOLOGY | Facility: CLINIC | Age: 87
End: 2022-03-07
Payer: MEDICARE

## 2022-03-07 VITALS
HEART RATE: 100 BPM | HEIGHT: 72 IN | BODY MASS INDEX: 24.38 KG/M2 | DIASTOLIC BLOOD PRESSURE: 70 MMHG | WEIGHT: 180 LBS | SYSTOLIC BLOOD PRESSURE: 118 MMHG | TEMPERATURE: 97.4 F | OXYGEN SATURATION: 95 %

## 2022-03-07 DIAGNOSIS — J20.9 ACUTE BRONCHITIS, UNSPECIFIED: ICD-10-CM

## 2022-03-07 PROCEDURE — 71046 X-RAY EXAM CHEST 2 VIEWS: CPT

## 2022-03-07 NOTE — REASON FOR VISIT
..  PSYCHIATRY PROGRESS NOTE    Date of Service:  12/17/2020      CPT code: 68167        Total time spent with the patient on the telephone: 26 minutes      This visit was conducted over the telephone as a result of the COVID-19 pandemic. This patient verbally consents to a telephone visit. Patient identifies as Rene Hernandez and reports he is currently located at home.     The telephone-only visit was being conducted for the purpose of providing treatment advice during a public health emergency. The treatment advice that was being provided was based on what was reported by the patient. Without the patient being seen and evaluated in person, there would be some risk that the information and/or assessment provided by the [facility] provider might be incomplete or inaccurate.        Current Medications:   Current Outpatient Medications   Medication Sig   • zoster vaccine recomb adjuvanted (Shingrix) 50 MCG/0.5ML injection Repeat dose in 2 to 6 months (unless 1 dose already given), for a total of 2 doses.   • lisinopril (ZESTRIL) 40 MG tablet Take 1 tablet by mouth daily.   • propranolol (INDERAL) 80 MG tablet Take 1 tablet by mouth 3 times daily.   • simvastatin (ZOCOR) 20 MG tablet Take 1 tablet by mouth nightly.   • hydrochlorothiazide (HYDRODIURIL) 12.5 MG tablet Take 1 tablet by mouth daily.   • QUEtiapine (SEROQUEL) 25 MG tablet 1 tablet up to three times daily   • OLANZapine (ZYPREXA) 15 MG tablet Take 1 tablet by mouth at bedtime.   • divalproex (DEPAKOTE ER) 500 MG 24 hr ER tablet 3 tablets every morning and 3 tablets at bedtime   • Calcium Carb-Cholecalciferol (CALCIUM + D3 PO) Take by mouth daily.   • Multiple Vitamins-Minerals (MULTIVITAMIN PO) Take by mouth daily.   • aspirin 81 MG EC tablet Take 1 tablet by mouth daily.     No current facility-administered medications for this visit.        Last Visit med changes: none    Chief Complaint: \"Follow up...\"    Interval History of Present Illness:   Rene ELLIOTT  Mary is a(n) 64 year old  male who presented for a follow up appointment. Describes his mood lately (past 2 weeks) as, \" ok, but I've been sleeping a lot...I'm happy generally speaking.\" Pt rates their mood (for the past 2 weeks) at a 7.5 out of 10 if 10 is happy/content and 0 is the most depressed.  Notes he is sleeping well.  Getting approximately 10 to 11 hours of sleep each night. Anxiety level is \"pretty good.\" Usually taking seroquel 25 mg po BID. Pt rates their anxiety level (for the past 2 weeks) at a 5 out of 10 if 10 is the highest anxiety level and 0 is no anxiety. Alcohol use disorder: sober since 2006. Denies any suicidal ideation, intent, or plan. Denies any paranoia, hallucinations or other symptoms of psychosis. Denies any symptoms of moi or hypomania. No homicidal ideation. No additional concerns or complaints.    Medication Side Effects: none    Psychiatric ROS: the following were not present: moi, psychotic symptoms (AVH), or SI/HI.    Past History/Family History/Social History Update:  Past psychiatric, family, and social history reviewed in psychiatric evaluation. No changes.    Medical ROS: (10 + systems)  General ROS: denies fever/sweats, chills, malaise, fatigue  Eyes, ears, nose, throat ROS: denies frequent nose bleeds, sinus pain, stuffy ears, ear pain, ringing in ears, pain with swallowing  Heme/Lymph ROS: denies easy bruising/bleeding, lumps or bumps   Respiratory ROS: denies shortness of breath, wheezing, coughing   Cardiovascular ROS: denies chest pain, palpitations, history of arrhythmias, decrease in exercise tolerance  Gastrointestinal ROS: denies abdominal pain, nausea, vomiting, changes in stool patterns  Genital/urinary ROS: denies dysuria, change in frequency, hesitancy  Musculoskeletal ROS: denies muscle/joint pain  Neurological ROS: denies headaches, dizziness, weakness, tremors, seizures  Endocrinological ROS: denies cold/heat intolerance, hair loss, amenorrhea or  irregular menses, polyuria, polyphagia, polydipsia    All other systems reviewed and are negative      Mental Status Exam:    Appearance/General: Could not be assessed due to telephone    Behavior/attitude: Cooperative, pleasant   Eye contact: Could not be assessed    Gait: Could not be assessed    Psychomotor activity: No psychomotor agitation or retardation    Speech: Normal rate, rhythm, and volume   Thought content: No AVH or other symptoms of psychosis   Thought process: Logical, linear, goal-directed    Mood: \"ok, but I've been sleeping a lot...I'm happy generally speaking\"   Affect: Could not be assessed    Suicidality: Denies   Homicidality: Denies   Insight/Judgment: Fair to good   Sensorium: Alert and oriented to person, place, date   Attention/concentration: Grossly intact   Memory/cognition: Intact based on clinical impression         MEDICAL DECISION MAKING  Complexity of medical decision making: MODERATE    Assessment/Diagnosis:         Axis I:  Bipolar disorder, type 1; generalized anxiety disorder; alcohol use disorder, in full sustained remission           Recommendations/Plan:     Bipolar disorder:  Improved  Medication changes:  due to sedation, will change depakote ER to 1000 mg every am and 2000 mg at bedtime        Generalized anxiety disorder:  Improved  Medication changes:  None        Alcohol use disorder:  In full sustained remission  Medication changes:  None.  Continue to encourage sobriety        Continue all other medications at current dosages.      --Labs--will order a depakote level        - Return for follow up in 1 year or earlier PRN    Education:    1. Discussed diagnosis, symptoms, treatment, and follow-up plan with the patient. Patient verbalized understanding and listened/asked questions.  No barriers identified.   2. Explained the risks, benefits, side effects, and potential adverse effects of the medications.  Patient expressed understanding and consented to medication(s).     3. Patient aware of emergency services:  Yes - Call 911 or head to closest ER if in crisis.         Angely De La Torre MD, FAPA     [Follow-Up] : a follow-up visit

## 2022-03-08 NOTE — DISCUSSION/SUMMARY
[FreeTextEntry1] : patient has a hx of intolerance to simple viral infections\par \par this is simply a cold\par \par discussed this with him, but he was upset that two weeks of resp symptoms had not abated\par \par given narcotic cough suppressant\par \par told to call if fever develops or purulent sputum

## 2022-03-08 NOTE — PHYSICAL EXAM
[Normal Oropharynx] : normal oropharynx [No Acute Distress] : no acute distress [Normal Appearance] : normal appearance [No Neck Mass] : no neck mass [Normal Rate/Rhythm] : normal rate/rhythm [No Murmurs] : no murmurs [Normal S1, S2] : normal s1, s2 [No Resp Distress] : no resp distress [Clear to Auscultation Bilaterally] : clear to auscultation bilaterally [No Abnormalities] : no abnormalities [Benign] : benign [Normal Gait] : normal gait [No Clubbing] : no clubbing [No Cyanosis] : no cyanosis [No Edema] : no edema [FROM] : FROM [Normal Color/ Pigmentation] : normal color/ pigmentation [No Focal Deficits] : no focal deficits [Oriented x3] : oriented x3 [Normal Affect] : normal affect

## 2022-03-08 NOTE — REVIEW OF SYSTEMS
[Fatigue] : fatigue [Cough] : cough [Dyspnea] : no dyspnea [Sputum] : no sputum [Negative] : Neurologic

## 2022-03-08 NOTE — HISTORY OF PRESENT ILLNESS
[TextBox_4] : had covid 2021 had infitlrates early feb  but minimal resp symptoms\par \par now two weeks of respiatoryinfectino\par \par couging and felt gurgling in chest \par \par had a week or so agon 100 degree.\par \par not sob.\par \par says he feelw gurlgin

## 2022-03-14 ENCOUNTER — APPOINTMENT (OUTPATIENT)
Dept: PULMONOLOGY | Facility: CLINIC | Age: 87
End: 2022-03-14
Payer: MEDICARE

## 2022-03-14 VITALS
OXYGEN SATURATION: 96 % | HEIGHT: 72 IN | TEMPERATURE: 97.4 F | DIASTOLIC BLOOD PRESSURE: 78 MMHG | WEIGHT: 180 LBS | HEART RATE: 88 BPM | SYSTOLIC BLOOD PRESSURE: 113 MMHG | BODY MASS INDEX: 24.38 KG/M2

## 2022-03-14 DIAGNOSIS — Z00.00 ENCOUNTER FOR GENERAL ADULT MEDICAL EXAMINATION W/OUT ABNORMAL FINDINGS: ICD-10-CM

## 2022-03-14 PROCEDURE — 99213 OFFICE O/P EST LOW 20 MIN: CPT | Mod: CS

## 2022-03-14 RX ORDER — PREDNISONE 20 MG/1
20 TABLET ORAL
Qty: 10 | Refills: 3 | Status: ACTIVE | COMMUNITY
Start: 2022-03-14 | End: 1900-01-01

## 2022-03-14 RX ORDER — AZITHROMYCIN 250 MG/1
250 TABLET, FILM COATED ORAL
Qty: 1 | Refills: 1 | Status: ACTIVE | COMMUNITY
Start: 2022-03-14 | End: 1900-01-01

## 2022-03-15 NOTE — DISCUSSION/SUMMARY
[FreeTextEntry1] : i gave him steroids and antibiotics\par \par but the results show that he did indeed have covid, likely recently\par \par will call and reassured\par \par has always tolerated any resp infection poorly in the past

## 2022-03-15 NOTE — HISTORY OF PRESENT ILLNESS
[TextBox_4] : patient  seen a week ago  and cough is no better\par \par this is likely covid that was missed\par \par

## 2022-03-15 NOTE — REVIEW OF SYSTEMS
[Fatigue] : fatigue [Cough] : cough [Sputum] : no sputum [Dyspnea] : no dyspnea [Negative] : Psychiatric

## 2023-02-06 ENCOUNTER — APPOINTMENT (OUTPATIENT)
Dept: PULMONOLOGY | Facility: CLINIC | Age: 88
End: 2023-02-06
Payer: MEDICARE

## 2023-02-06 PROCEDURE — 71046 X-RAY EXAM CHEST 2 VIEWS: CPT

## 2023-02-06 PROCEDURE — 99213 OFFICE O/P EST LOW 20 MIN: CPT | Mod: 25

## 2023-02-07 NOTE — PHYSICAL EXAM
[No Acute Distress] : no acute distress [Normal Oropharynx] : normal oropharynx [Normal Appearance] : normal appearance [No Neck Mass] : no neck mass [Normal Rate/Rhythm] : normal rate/rhythm [Normal S1, S2] : normal s1, s2 [No Murmurs] : no murmurs [No Resp Distress] : no resp distress [Clear to Auscultation Bilaterally] : clear to auscultation bilaterally [No Abnormalities] : no abnormalities [Benign] : benign [Normal Gait] : normal gait

## 2023-02-07 NOTE — REASON FOR VISIT
[Follow-Up] : a follow-up visit [TextBox_44] : as is typical for this gentleman, he is chagrined about an ordinary cold

## 2023-02-07 NOTE — HISTORY OF PRESENT ILLNESS
[TextBox_4] : coughing fo ten day\par \par cough right away\par \par brown sputum\par \par no shortness of breath\par \par deep cough. \par \par looks fine

## 2023-02-07 NOTE — REVIEW OF SYSTEMS
[Nasal Congestion] : nasal congestion [Cough] : cough [Sputum] : sputum [Dyspnea] : no dyspnea [Negative] : Neurologic

## 2023-02-07 NOTE — DISCUSSION/SUMMARY
[FreeTextEntry1] : as he has demonstrated in the past, become someone overtly chagrined and relatively ordinary resp infection that can be taken care of by internist, or actually needs no doctor visit\par \par otc cold would suffice, but will give him short course of prednisone

## 2023-02-13 ENCOUNTER — RX RENEWAL (OUTPATIENT)
Age: 88
End: 2023-02-13

## 2023-02-13 RX ORDER — PREDNISONE 20 MG/1
20 TABLET ORAL
Qty: 10 | Refills: 5 | Status: ACTIVE | COMMUNITY
Start: 2023-02-06 | End: 1900-01-01

## 2023-08-15 ENCOUNTER — APPOINTMENT (OUTPATIENT)
Dept: PULMONOLOGY | Facility: CLINIC | Age: 88
End: 2023-08-15
Payer: MEDICARE

## 2023-08-15 DIAGNOSIS — R06.00 DYSPNEA, UNSPECIFIED: ICD-10-CM

## 2023-08-15 PROCEDURE — ZZZZZ: CPT

## 2023-08-15 PROCEDURE — 94727 GAS DIL/WSHOT DETER LNG VOL: CPT

## 2023-08-15 PROCEDURE — 99213 OFFICE O/P EST LOW 20 MIN: CPT | Mod: 25

## 2023-08-16 NOTE — HISTORY OF PRESENT ILLNESS
[TextBox_4] : patient typically comes to the office for minor viral coughs adn the like, adn usually treatment is supportive. now here with sob "just when bending down", which is typically functional  i had full pfts done and they are perfectly normal as expected  but he has a murmur that i cannot tell if it's physiologicallyh important or not

## 2023-08-16 NOTE — REVIEW OF SYSTEMS
[Dyspnea] : dyspnea [Negative] : Neurologic [Nasal Congestion] : no nasal congestion [Cough] : no cough [Sputum] : no sputum [TextBox_30] : i'm short of breath just bending over

## 2023-08-16 NOTE — PHYSICAL EXAM
[No Acute Distress] : no acute distress [Normal Oropharynx] : normal oropharynx [Normal Appearance] : normal appearance [No Neck Mass] : no neck mass [Normal Rate/Rhythm] : normal rate/rhythm [Normal S1, S2] : normal s1, s2 [No Resp Distress] : no resp distress [Clear to Auscultation Bilaterally] : clear to auscultation bilaterally [No Abnormalities] : no abnormalities [Benign] : benign [Normal Gait] : normal gait [No Clubbing] : no clubbing [No Cyanosis] : no cyanosis [No Edema] : no edema [FROM] : FROM [Normal Color/ Pigmentation] : normal color/ pigmentation [No Focal Deficits] : no focal deficits [Oriented x3] : oriented x3 [Normal Affect] : normal affect [TextBox_54] : short systolic murmur

## 2023-08-28 NOTE — DISCUSSION/SUMMARY
[FreeTextEntry1] : functional dy spnea likely  but murmu needs investigation  will refe to thom knott.  
1

## 2024-01-07 LAB
COVID-19 NUCLEOCAPSID  GAM ANTIBODY INTERPRETATION: POSITIVE
HADV DNA SPEC QL NAA+PROBE: DETECTED
RAPID RVP RESULT: DETECTED
RV+EV RNA SPEC QL NAA+PROBE: DETECTED
SARS-COV-2 AB SERPL QL IA: 74.5 INDEX
SARS-COV-2 N GENE NPH QL NAA+PROBE: NOT DETECTED
SARS-COV-2 RNA PNL RESP NAA+PROBE: NOT DETECTED

## 2024-01-11 RX ORDER — AZITHROMYCIN 250 MG/1
250 TABLET, FILM COATED ORAL
Qty: 1 | Refills: 1 | Status: ACTIVE | COMMUNITY
Start: 2024-01-11 | End: 1900-01-01

## 2024-01-11 RX ORDER — PREDNISONE 20 MG/1
20 TABLET ORAL
Qty: 10 | Refills: 11 | Status: ACTIVE | COMMUNITY
Start: 2024-01-11 | End: 1900-01-01

## 2024-03-01 NOTE — PHYSICAL THERAPY INITIAL EVALUATION ADULT - RANGE OF MOTION EXAMINATION, REHAB EVAL
Bupropion and furosemide marked as discontinued 12/23.     Refill request is for a maintenance medication and has met the criteria specified in the Ambulatory Medication Refill Standing Order for eligibility, visits, laboratory, alerts and was sent to the requested pharmacy.    Requested Prescriptions     Signed Prescriptions Disp Refills    alfuzosin ER 10 MG Oral Tablet 24 Hr 90 tablet 3     Sig: Take 1 tablet (10 mg total) by mouth daily.     Authorizing Provider: CHANTELL KING     Ordering User: TRANG REN    Esomeprazole Magnesium 40 MG Oral Capsule Delayed Release 90 capsule 3     Sig: Take 1 capsule (40 mg total) by mouth before breakfast.     Authorizing Provider: CHANTELL KING     Ordering User: TRANG REN    atorvastatin 20 MG Oral Tab 90 tablet 3     Sig: Take 1 tablet (20 mg total) by mouth nightly.     Authorizing Provider: CHANTELL KING     Ordering User: TRANG REN        bilateral upper extremity ROM was WFL (within functional limits)/bilateral lower extremity ROM was WFL (within functional limits)

## 2024-03-18 NOTE — ED ADULT NURSE NOTE - OBJECTIVE STATEMENT
82 yr old male patient with c/o of bruising & hematoma to R shin/ lower leg.  Patient states on monday he had a mechanical fall on the bus & bumped his R Leg on the steps.  Pt denies head trauma.  States he has had a ct scan & xray for the leg, which stated he had a hematoma.  Noted hematoma worsening overtime since monday.  Yesterday night he noted blueness in his R toes.  NO distress noted.  Breathing eaisly and unlabored.  Bilateral pedal pulses intact.  A+OX3, ambulatory w steady gait.
(3) adequate

## 2024-04-05 RX ORDER — PROMETHAZINE HYDROCHLORIDE AND CODEINE PHOSPHATE 6.25; 1 MG/5ML; MG/5ML
6.25-1 SOLUTION ORAL
Qty: 200 | Refills: 0 | Status: ACTIVE | COMMUNITY
Start: 2022-03-07 | End: 1900-01-01

## 2024-08-12 ENCOUNTER — APPOINTMENT (OUTPATIENT)
Dept: NUCLEAR MEDICINE | Facility: HOSPITAL | Age: 89
End: 2024-08-12

## 2024-10-25 ENCOUNTER — NON-APPOINTMENT (OUTPATIENT)
Age: 89
End: 2024-10-25

## 2024-10-25 ENCOUNTER — APPOINTMENT (OUTPATIENT)
Dept: PULMONOLOGY | Facility: CLINIC | Age: 89
End: 2024-10-25
Payer: MEDICARE

## 2024-10-25 VITALS
TEMPERATURE: 99.1 F | WEIGHT: 180 LBS | OXYGEN SATURATION: 96 % | HEIGHT: 72 IN | BODY MASS INDEX: 24.38 KG/M2 | DIASTOLIC BLOOD PRESSURE: 76 MMHG | SYSTOLIC BLOOD PRESSURE: 121 MMHG | HEART RATE: 87 BPM

## 2024-10-25 PROCEDURE — 99213 OFFICE O/P EST LOW 20 MIN: CPT | Mod: 25

## 2024-10-28 LAB
RAPID RVP RESULT: NOT DETECTED
SARS-COV-2 RNA NPH QL NAA+NON-PROBE: NOT DETECTED

## 2024-12-11 NOTE — PROGRESS NOTE ADULT - ATTENDING COMMENTS
Chief Complaints and History of Present Illnesses   Patient presents with    Follow Up     Vitreous hemorrhage of right eye   PDR and h/o RD     Chief Complaint(s) and History of Present Illness(es)       Follow Up              Comments: Vitreous hemorrhage of right eye   PDR and h/o RD              Comments    States vision in the right eye is better  Occ green lights in both eyes   No floaters , eye pain or redness    Kassidy Pat COT 8:12 AM December 11, 2024                         Covid 19 hypoxic respiratory failure d#5, CKD, bl. PE on AC, tx to tele for worsening resp. failure in setting of af with rvr.  Respiratory status is stable, now back on NC, increase in requirement may have been due to some pulm edema during episodes of uncontrolled af.  Continue ac for PE and af,  cardiac eval for af management. will hold off amio, increase doses of BB as needed.

## 2025-01-10 ENCOUNTER — APPOINTMENT (OUTPATIENT)
Dept: PULMONOLOGY | Facility: CLINIC | Age: 89
End: 2025-01-10
Payer: MEDICARE

## 2025-01-10 ENCOUNTER — NON-APPOINTMENT (OUTPATIENT)
Age: 89
End: 2025-01-10

## 2025-01-10 VITALS
BODY MASS INDEX: 24.38 KG/M2 | DIASTOLIC BLOOD PRESSURE: 60 MMHG | OXYGEN SATURATION: 96 % | WEIGHT: 180 LBS | HEIGHT: 72 IN | SYSTOLIC BLOOD PRESSURE: 110 MMHG | HEART RATE: 80 BPM | TEMPERATURE: 97.9 F

## 2025-01-10 PROCEDURE — 99213 OFFICE O/P EST LOW 20 MIN: CPT | Mod: 25

## 2025-01-10 PROCEDURE — 94010 BREATHING CAPACITY TEST: CPT

## 2025-01-10 RX ORDER — AZITHROMYCIN 250 MG/1
250 TABLET, FILM COATED ORAL
Qty: 1 | Refills: 1 | Status: ACTIVE | COMMUNITY
Start: 2025-01-10 | End: 1900-01-01

## 2025-01-17 ENCOUNTER — EMERGENCY (EMERGENCY)
Facility: HOSPITAL | Age: 89
LOS: 1 days | Discharge: HOME | End: 2025-01-17
Attending: STUDENT IN AN ORGANIZED HEALTH CARE EDUCATION/TRAINING PROGRAM | Admitting: STUDENT IN AN ORGANIZED HEALTH CARE EDUCATION/TRAINING PROGRAM
Payer: MEDICARE

## 2025-01-17 VITALS
RESPIRATION RATE: 18 BRPM | WEIGHT: 199.96 LBS | HEART RATE: 100 BPM | OXYGEN SATURATION: 93 % | TEMPERATURE: 98 F | HEIGHT: 72 IN | SYSTOLIC BLOOD PRESSURE: 107 MMHG | DIASTOLIC BLOOD PRESSURE: 68 MMHG

## 2025-01-17 DIAGNOSIS — R11.2 NAUSEA WITH VOMITING, UNSPECIFIED: ICD-10-CM

## 2025-01-17 DIAGNOSIS — Z98.89 OTHER SPECIFIED POSTPROCEDURAL STATES: Chronic | ICD-10-CM

## 2025-01-17 LAB — ADD ON TEST-SPECIMEN IN LAB: SIGNIFICANT CHANGE UP

## 2025-01-17 PROCEDURE — 71045 X-RAY EXAM CHEST 1 VIEW: CPT | Mod: 26

## 2025-01-17 PROCEDURE — 70450 CT HEAD/BRAIN W/O DYE: CPT | Mod: 26

## 2025-01-17 PROCEDURE — 72131 CT LUMBAR SPINE W/O DYE: CPT | Mod: 26

## 2025-01-17 PROCEDURE — 99284 EMERGENCY DEPT VISIT MOD MDM: CPT | Mod: FS

## 2025-01-17 RX ORDER — SODIUM CHLORIDE 9 MG/ML
500 INJECTION, SOLUTION INTRAMUSCULAR; INTRAVENOUS; SUBCUTANEOUS ONCE
Refills: 0 | Status: COMPLETED | OUTPATIENT
Start: 2025-01-17 | End: 2025-01-17

## 2025-01-17 RX ADMIN — SODIUM CHLORIDE 1000 MILLILITER(S): 9 INJECTION, SOLUTION INTRAMUSCULAR; INTRAVENOUS; SUBCUTANEOUS at 23:04

## 2025-01-17 NOTE — ED ADULT NURSE NOTE - NSICDXPASTMEDICALHX_GEN_ALL_CORE_FT
PAST MEDICAL HISTORY:  Chronic kidney disease (CKD)     Essential tremor     Mitral valve prolapse     Pulmonary embolism

## 2025-01-17 NOTE — ED ADULT TRIAGE NOTE - PAIN RATING/NUMBER SCALE (0-10): ACTIVITY
Caller: LETI    Relationship to patient: SELF     Best call back number: [phone]    Concerns or Questions if Applicable: PATIENT IS CONCERNED WITH HER PULSE OXYGEN LEVELS GOING UP AND DOWN. PLEASE GIVE HER A CALL IF THIS IS NORMAL FOR COVID-19. PLEASE GIVE HER A CALL ASAP.    Travel screen questions: NA           
Pt went to ER for problem below.    
0 (no pain/absence of nonverbal indicators of pain)

## 2025-01-17 NOTE — ED ADULT NURSE REASSESSMENT NOTE - NS ED NURSE REASSESS COMMENT FT1
Received report from Annel MENDOZA . Received patient in stretcher. AOX4. Vital signs as noted in flowsheet.  Patient denies chest pain, pain, discomfort, shortness of breath, difficulty breathing and any form of distress not noted. Patient oriented to ED area. Plan of care discussed and verbalized understanding. All needs attended. Fall risk precautions maintained. Purposeful proactive hourly rounding in progress.

## 2025-01-17 NOTE — ED ADULT NURSE NOTE - CHIEF COMPLAINT QUOTE
BIBA from restaurant after x1 syncope and x1 near syncope with fall. denies head strike. unsteady with ambulation. AAOx3. denies fevers, chills, cp, sob. Cough for 4 weeks. BGL EKG in prog.

## 2025-01-17 NOTE — ED ADULT NURSE NOTE - OBJECTIVE STATEMENT
Pt. a&ox4 ambulatory, bibems after "looking like she was going to pass out at the dinner table" while at a restaurant with family. As per dtr, pt appeared pale, staring off into space, and "out of it", c/o lightheadedness, looking as though she may faint. Pt. did not lose consciousness, and is denying any complaint of cp, SOB, focal s/s, HA, or vision changes. ekg done, pt is fully oriented, awake, alert. Speaking in clear sentences. BGL in 300s, hx of DM2, unsure what she takes for her glucose, dtr has bag of meds at bedside. Pt. a&ox4 ambulatory, bibems after "looking like he was going to pass out at the dinner table" while at a restaurant with family. As per dtr, pt appeared pale, staring off into space, and "out of it", c/o lightheadedness, looking as though he may faint. Pt. did not lose consciousness, and is denying any complaint of cp, SOB, focal s/s, HA, or vision changes. ekg done, pt is fully oriented, awake, alert. Speaking in clear sentences. Pt. a&ox4 ambulatory with assist, bibems after 1x syncope followed by a second episode of a near syncope with + fall.  Pt. denies headstrike, and is denying any complaint of cp, SOB, focal s/s, HA, or vision changes. ekg done, pt is fully oriented, awake, alert. Speaking in clear sentences.

## 2025-01-17 NOTE — ED ADULT TRIAGE NOTE - CHIEF COMPLAINT QUOTE
Population Health Chart Review & Patient Outreach Details      Additional [facility] Health Notes:               Updates Requested / Reviewed:      Updated Care Coordination Note         Health Maintenance Topics Overdue:      VBHM Score: 4     Urine Screening  Foot Exam  AAA Screening  LDCT Lung Screen    Shingles/Zoster Vaccine  RSV Vaccine                  Health Maintenance Topic(s) Outreach Outcomes & Actions Taken:    Eye Exam - Outreach Outcomes & Actions Taken  : Diabetic Eye External Records Uploaded, Care Team & History Updated if Applicable      
BIBA from restaurant after x1 syncope and x1 near syncope with fall. denies head strike. unsteady with ambulation. AAOx3. denies fevers, chills, cp, sob. Cough for 4 weeks. BGL EKG in prog.

## 2025-01-17 NOTE — ED ADULT NURSE NOTE - NSFALLUNIVINTERV_ED_ALL_ED
Bed/Stretcher in lowest position, wheels locked, appropriate side rails in place/Call bell, personal items and telephone in reach/Instruct patient to call for assistance before getting out of bed/chair/stretcher/Non-slip footwear applied when patient is off stretcher/[place] to call system/Physically safe environment - no spills, clutter or unnecessary equipment/Purposeful proactive rounding/Room/bathroom lighting operational, light cord in reach

## 2025-01-17 NOTE — ED ADULT NURSE NOTE - EXTENSIONS OF SELF_ADULT
Stationary ECG Study

                           [facility] - ED

                                       

                                       Test Date:    2017

Pat Name:     TANIKA GUSMAN              Department:   

Patient ID:   [id number]                 Room:         -

Gender:       M                        Technician:   karen

:          1969               Requested By: Lilian Wheeler 

Order Number: [id number]     Reading MD:   Panchito Brown

                                 Measurements

Intervals                              Axis          

Rate:         59                       P:            12

KY:           142                      QRS:          -14

QRSD:         110                      T:            -1

QT:           429                                    

QTc:          428                                    

                           Interpretive Statements

SINUS BRADYCARDIA

NSTTW ABNORMALITY

SIMILAR TO 3/12/17

Electronically Signed On 2017 19:38:39 EDT by Panchito Brown None

## 2025-01-18 VITALS
DIASTOLIC BLOOD PRESSURE: 80 MMHG | SYSTOLIC BLOOD PRESSURE: 118 MMHG | HEART RATE: 96 BPM | RESPIRATION RATE: 17 BRPM | TEMPERATURE: 98 F | OXYGEN SATURATION: 96 %

## 2025-01-18 LAB
APPEARANCE UR: CLEAR — SIGNIFICANT CHANGE UP
BILIRUB UR-MCNC: NEGATIVE — SIGNIFICANT CHANGE UP
COLOR SPEC: YELLOW — SIGNIFICANT CHANGE UP
DIFF PNL FLD: NEGATIVE — SIGNIFICANT CHANGE UP
GLUCOSE UR QL: NEGATIVE MG/DL — SIGNIFICANT CHANGE UP
KETONES UR-MCNC: NEGATIVE MG/DL — SIGNIFICANT CHANGE UP
LEUKOCYTE ESTERASE UR-ACNC: ABNORMAL
NITRITE UR-MCNC: NEGATIVE — SIGNIFICANT CHANGE UP
PH UR: 5.5 — SIGNIFICANT CHANGE UP (ref 5–8)
PROT UR-MCNC: SIGNIFICANT CHANGE UP MG/DL
SP GR SPEC: 1.02 — SIGNIFICANT CHANGE UP (ref 1–1.03)
UROBILINOGEN FLD QL: 0.2 MG/DL — SIGNIFICANT CHANGE UP (ref 0.2–1)

## 2025-01-18 PROCEDURE — 36415 COLL VENOUS BLD VENIPUNCTURE: CPT

## 2025-01-18 PROCEDURE — 80048 BASIC METABOLIC PNL TOTAL CA: CPT

## 2025-01-18 PROCEDURE — 72131 CT LUMBAR SPINE W/O DYE: CPT | Mod: MC

## 2025-01-18 PROCEDURE — 70450 CT HEAD/BRAIN W/O DYE: CPT | Mod: MC

## 2025-01-18 PROCEDURE — 87086 URINE CULTURE/COLONY COUNT: CPT

## 2025-01-18 PROCEDURE — 85025 COMPLETE CBC W/AUTO DIFF WBC: CPT

## 2025-01-18 PROCEDURE — 80076 HEPATIC FUNCTION PANEL: CPT

## 2025-01-18 PROCEDURE — 81001 URINALYSIS AUTO W/SCOPE: CPT

## 2025-01-18 PROCEDURE — 87077 CULTURE AEROBIC IDENTIFY: CPT

## 2025-01-18 PROCEDURE — 99284 EMERGENCY DEPT VISIT MOD MDM: CPT | Mod: 25

## 2025-01-18 PROCEDURE — 82962 GLUCOSE BLOOD TEST: CPT

## 2025-01-18 PROCEDURE — 99285 EMERGENCY DEPT VISIT HI MDM: CPT | Mod: 25

## 2025-01-18 PROCEDURE — 83735 ASSAY OF MAGNESIUM: CPT

## 2025-01-18 PROCEDURE — 71045 X-RAY EXAM CHEST 1 VIEW: CPT

## 2025-01-18 PROCEDURE — 87186 SC STD MICRODIL/AGAR DIL: CPT

## 2025-01-18 NOTE — ED PROVIDER NOTE - NSFOLLOWUPINSTRUCTIONS_ED_ALL_ED_FT
Thank you for visiting [facility] Emergency Department.      Please know that no emergency visit is complete without follow-up with your primary care provider within  1 week.  Please bring copies of all discharge papers and results and show to your doctor.    Please continue taking all  your mediations as prescribed .    I appreciated your patience and hope you feel better soon.   Return to ER immediately if you develop fevers, chills, chest pain, shortness of breath, abdominal pain,  dizziness, and/or any concerning symptoms.

## 2025-01-18 NOTE — ED PROVIDER NOTE - OBJECTIVE STATEMENT
89 yo male with h/o HTN, PE, CKD, essential tremor BIBEMS from the restaurant after he had witnessed episode of syncope and then episode almost near syncope. Pt states  for his essential tremor he self-medicates with vodka, he takes 2 shots of vodka every morning one martini at night with improvement of his symptoms.Pt also admits that he has cough for the past 4 weeks. he was seen by his PMD, was diagnosed with viral infection and prescribed medication for cough with codeine. Pt mentioned he took cough medications and extra 2 shots of vodka before going for dinner. Wife state he had drinks at restaurant too, Pt reports he felt very weak  and he slowly slid down on the floor. Wife states pt had no vomiting, no signs of seizure, no significant mental status lawrence after he fell. Next time he felt weak  and almost couldn't walk. Wife states pt is intoxicated but he fell on the floor and hit his head and he takes  blood thinners.  Pt denies CP, SOB, upper and lower extremities pain, mentioned lower back pain after the fall.

## 2025-01-18 NOTE — ED PROVIDER NOTE - PATIENT PORTAL LINK FT
You can access the FollowMyHealth Patient Portal offered by [facility] by registering at the following website: [email or web address]. By joining [employer]’s FollowMyHealth portal, you will also be able to view your health information using other applications (apps) compatible with our system.

## 2025-01-18 NOTE — ED PROVIDER NOTE - CLINICAL SUMMARY MEDICAL DECISION MAKING FREE TEXT BOX
89 yo male with h/o HTN, PE, CKD, essential tremor BIBEMS from the restaurant after he had witnessed episode of syncope and then episode almost near syncope. Pt states  for his essential tremor he self-medicates with vodka, he takes 2 shots of vodka every morning one martini at night with improvement of his symptoms.Pt also admits that he has cough for the past 4 weeks. he was seen by his PMD, was diagnosed with viral infection and prescribed medication for cough with codeine. Pt mentioned he took cough medications and extra 2 shots of vodka before going for dinner. Wife state he had drinks at restaurant too, Pt reports he felt very weak  and he slowly slid down on the floor. Wife states pt had no vomiting, no signs of seizure, no significant mental status lawrence after he fell. Next time he felt weak  and almost couldn't walk. Wife states pt is intoxicated but he fell on the floor and hit his head and he takes  blood thinners.  Pt denies CP, SOB, upper and lower extremities pain, mentioned lower back pain after the fall.  Pt has slightly slurred speech, and admits drinking more than every day today. suspect fall due to intoxication , mix of alcohol and codeine syrup? No ischemic changes on his ecg, initially pt tachycardiac, pending labs and CT head. to r/o any possible ich,  will hydrate and observe, wait for etoh to metabolize and re-evaluate. Pt agrees with a treatment plan.

## 2025-01-18 NOTE — ED PROVIDER NOTE - NSCAREINITIATED _GEN_ER
Norma Fuller)
PAST SURGICAL HISTORY:  No significant past surgical history     No significant past surgical history

## 2025-01-18 NOTE — ED PROVIDER NOTE - ATTENDING APP SHARED VISIT CONTRIBUTION OF CARE
89 yo M w PMH of HTN, PE, CKD, essential tremor BIBEMS s/p witnessed episode of syncope. No post-ictal state, incontinence, tongue biting or convulsions.  Unlikely PE wells low risk, no SOB, no CP  No susp of seizure based on history  Will CT head r/o CVA  EKG r/o arrhythmia/MI  Trop  Labs - r/o electrolyte/metabolic disturbance  Afebrile, will r/o infection with CXR and UA  R/o anemia  no h/o trauma

## 2025-01-18 NOTE — ED PROVIDER NOTE - PROGRESS NOTE DETAILS
no acute pathology on head CT and lumbar spine CT. Pt ambulate with steady gait and reports he is feeling better. Wife states they are ready to be discharge home.

## 2025-01-18 NOTE — ED PROVIDER NOTE - CARE PLAN
Principal Discharge DX:	Syncope, near  Secondary Diagnosis:	Medication overdose, accidental or unintentional, initial encounter   1

## 2025-05-23 ENCOUNTER — APPOINTMENT (OUTPATIENT)
Dept: PULMONOLOGY | Facility: CLINIC | Age: 89
End: 2025-05-23
Payer: MEDICARE

## 2025-05-23 VITALS
WEIGHT: 180 LBS | BODY MASS INDEX: 24.38 KG/M2 | DIASTOLIC BLOOD PRESSURE: 66 MMHG | HEART RATE: 77 BPM | SYSTOLIC BLOOD PRESSURE: 115 MMHG | TEMPERATURE: 97.9 F | HEIGHT: 72 IN | OXYGEN SATURATION: 95 %

## 2025-05-23 PROCEDURE — 71046 X-RAY EXAM CHEST 2 VIEWS: CPT

## 2025-05-23 PROCEDURE — 99213 OFFICE O/P EST LOW 20 MIN: CPT | Mod: 25

## 2025-05-23 RX ORDER — AZITHROMYCIN 250 MG/1
250 TABLET, FILM COATED ORAL
Qty: 1 | Refills: 1 | Status: ACTIVE | COMMUNITY
Start: 2025-05-23 | End: 1900-01-01

## 2025-05-25 LAB
RESP PATH DNA+RNA PNL NPH NAA+NON-PROBE: NOT DETECTED
SARS-COV-2 RNA RESP QL NAA+PROBE: NOT DETECTED